# Patient Record
Sex: FEMALE | Race: WHITE | NOT HISPANIC OR LATINO | Employment: OTHER | URBAN - METROPOLITAN AREA
[De-identification: names, ages, dates, MRNs, and addresses within clinical notes are randomized per-mention and may not be internally consistent; named-entity substitution may affect disease eponyms.]

---

## 2018-12-13 ENCOUNTER — HOSPITAL ENCOUNTER (OUTPATIENT)
Dept: OTHER | Facility: HOSPITAL | Age: 63
Discharge: HOME | End: 2018-12-13
Attending: PHYSICIAN ASSISTANT | Admitting: PHYSICIAN ASSISTANT

## 2018-12-13 LAB
ALBUMIN SERPL-MCNC: 3.6 G/DL (ref 3.5–4.8)
ALBUMIN/GLOB SERPL: 1.2 {RATIO} (ref 1–1.7)
ALP SERPL-CCNC: 83 IU/L (ref 32–91)
ALT SERPL-CCNC: 30 IU/L (ref 14–54)
ANION GAP SERPL CALC-SCNC: 11.1 MMOL/L (ref 10–20)
AST SERPL-CCNC: 26 IU/L (ref 15–41)
BASOPHILS # BLD AUTO: 0.1 10*3/UL (ref 0–0.2)
BASOPHILS NFR BLD AUTO: 1 % (ref 0–2)
BILIRUB SERPL-MCNC: 0.5 MG/DL (ref 0.3–1.2)
BUN SERPL-MCNC: 21 MG/DL (ref 8–20)
BUN/CREAT SERPL: 23.3 (ref 5.4–26.2)
CALCIUM SERPL-MCNC: 9.2 MG/DL (ref 8.9–10.3)
CHLORIDE SERPL-SCNC: 102 MMOL/L (ref 101–111)
CK MB CFR SERPL ELPH: 1.8 NG/ML (ref 0.6–6.3)
CONV CO2: 29 MMOL/L (ref 22–32)
CONV TOTAL PROTEIN: 6.5 G/DL (ref 6.1–7.9)
CREAT UR-MCNC: 0.9 MG/DL (ref 0.4–1)
DIFFERENTIAL METHOD BLD: (no result)
EOSINOPHIL # BLD AUTO: 0.2 10*3/UL (ref 0–0.3)
EOSINOPHIL # BLD AUTO: 4 % (ref 0–3)
ERYTHROCYTE [DISTWIDTH] IN BLOOD BY AUTOMATED COUNT: 14.5 % (ref 11.5–14.5)
GLOBULIN UR ELPH-MCNC: 2.9 G/DL (ref 2.5–3.8)
GLUCOSE SERPL-MCNC: 123 MG/DL (ref 65–99)
HCT VFR BLD AUTO: 41.7 % (ref 35–49)
HGB BLD-MCNC: 14.4 G/DL (ref 12–15)
LYMPHOCYTES # BLD AUTO: 1.6 10*3/UL (ref 0.8–4.8)
LYMPHOCYTES NFR BLD AUTO: 25 % (ref 18–42)
MCH RBC QN AUTO: 31.5 PG (ref 26–32)
MCHC RBC AUTO-ENTMCNC: 34.6 G/DL (ref 32–36)
MCV RBC AUTO: 91.2 FL (ref 80–94)
MONOCYTES # BLD AUTO: 0.6 10*3/UL (ref 0.1–1.3)
MONOCYTES NFR BLD AUTO: 9 % (ref 2–11)
NEUTROPHILS # BLD AUTO: 3.8 10*3/UL (ref 2.3–8.6)
NEUTROPHILS NFR BLD AUTO: 61 % (ref 50–75)
NRBC BLD AUTO-RTO: 0 /100{WBCS}
NRBC/RBC NFR BLD MANUAL: 0 10*3/UL
PLATELET # BLD AUTO: 150 10*3/UL (ref 150–450)
PMV BLD AUTO: 9.8 FL (ref 7.4–10.4)
POTASSIUM SERPL-SCNC: 4.1 MMOL/L (ref 3.6–5.1)
RBC # BLD AUTO: 4.57 10*6/UL (ref 4–5.4)
SODIUM SERPL-SCNC: 138 MMOL/L (ref 136–144)
TROPONIN I SERPL-MCNC: <0.03 NG/ML (ref 0–0.03)
WBC # BLD AUTO: 6.3 10*3/UL (ref 4.5–11.5)

## 2019-12-23 ENCOUNTER — OFFICE (AMBULATORY)
Dept: URBAN - METROPOLITAN AREA CLINIC 64 | Facility: CLINIC | Age: 64
End: 2019-12-23

## 2019-12-23 VITALS
HEIGHT: 65 IN | DIASTOLIC BLOOD PRESSURE: 54 MMHG | HEART RATE: 98 BPM | SYSTOLIC BLOOD PRESSURE: 149 MMHG | WEIGHT: 245 LBS

## 2019-12-23 DIAGNOSIS — R10.11 RIGHT UPPER QUADRANT PAIN: ICD-10-CM

## 2019-12-23 DIAGNOSIS — K76.0 FATTY (CHANGE OF) LIVER, NOT ELSEWHERE CLASSIFIED: ICD-10-CM

## 2019-12-23 DIAGNOSIS — I10 ESSENTIAL (PRIMARY) HYPERTENSION: ICD-10-CM

## 2019-12-23 DIAGNOSIS — R10.9 UNSPECIFIED ABDOMINAL PAIN: ICD-10-CM

## 2019-12-23 PROCEDURE — 99203 OFFICE O/P NEW LOW 30 MIN: CPT | Performed by: INTERNAL MEDICINE

## 2019-12-23 RX ORDER — POLYETHYLENE GLYCOL 3350, SODIUM SULFATE, SODIUM CHLORIDE, POTASSIUM CHLORIDE, ASCORBIC ACID, SODIUM ASCORBATE 140-9-5.2G
KIT ORAL
Qty: 1 | Refills: 0 | Status: ACTIVE
Start: 2019-12-23

## 2021-01-19 PROCEDURE — U0003 INFECTIOUS AGENT DETECTION BY NUCLEIC ACID (DNA OR RNA); SEVERE ACUTE RESPIRATORY SYNDROME CORONAVIRUS 2 (SARS-COV-2) (CORONAVIRUS DISEASE [COVID-19]), AMPLIFIED PROBE TECHNIQUE, MAKING USE OF HIGH THROUGHPUT TECHNOLOGIES AS DESCRIBED BY CMS-2020-01-R: HCPCS | Performed by: NURSE PRACTITIONER

## 2021-01-20 ENCOUNTER — TELEPHONE (OUTPATIENT)
Dept: URGENT CARE | Facility: CLINIC | Age: 66
End: 2021-01-20

## 2021-11-23 PROCEDURE — U0004 COV-19 TEST NON-CDC HGH THRU: HCPCS | Performed by: NURSE PRACTITIONER

## 2022-01-12 PROCEDURE — 88341 IMHCHEM/IMCYTCHM EA ADD ANTB: CPT

## 2022-01-12 PROCEDURE — 88305 TISSUE EXAM BY PATHOLOGIST: CPT | Performed by: RADIOLOGY

## 2022-01-12 PROCEDURE — 88360 TUMOR IMMUNOHISTOCHEM/MANUAL: CPT

## 2022-01-12 PROCEDURE — 88360 TUMOR IMMUNOHISTOCHEM/MANUAL: CPT | Performed by: RADIOLOGY

## 2022-01-12 PROCEDURE — 88342 IMHCHEM/IMCYTCHM 1ST ANTB: CPT

## 2022-01-12 PROCEDURE — 88342 IMHCHEM/IMCYTCHM 1ST ANTB: CPT | Performed by: RADIOLOGY

## 2022-01-12 PROCEDURE — 88341 IMHCHEM/IMCYTCHM EA ADD ANTB: CPT | Performed by: RADIOLOGY

## 2022-01-13 ENCOUNTER — LAB REQUISITION (OUTPATIENT)
Dept: LAB | Facility: HOSPITAL | Age: 67
End: 2022-01-13

## 2022-01-13 DIAGNOSIS — Z00.00 ENCOUNTER FOR GENERAL ADULT MEDICAL EXAMINATION WITHOUT ABNORMAL FINDINGS: ICD-10-CM

## 2022-01-20 ENCOUNTER — TELEPHONE (OUTPATIENT)
Dept: SURGERY | Facility: CLINIC | Age: 67
End: 2022-01-20

## 2022-01-20 LAB
LAB AP CASE REPORT: NORMAL
LAB AP DIAGNOSIS COMMENT: NORMAL
LAB AP IHC HER2/NEU REPORT,ADDENDUM: NORMAL
LAB AP SPECIAL STAINS: NORMAL
PATH REPORT.FINAL DX SPEC: NORMAL
PATH REPORT.GROSS SPEC: NORMAL

## 2022-01-20 NOTE — TELEPHONE ENCOUNTER
New patient appointment with Dr. Serrano is scheduled on 2/3/2022 @ 9:00am.    Called and left message with patient about appointment time, patient to call back and confirm.    Sent patient a reminder letter in the mail.

## 2022-01-21 ENCOUNTER — TELEPHONE (OUTPATIENT)
Dept: SURGERY | Facility: CLINIC | Age: 67
End: 2022-01-21

## 2022-01-21 NOTE — TELEPHONE ENCOUNTER
Patient has requested Dr. Isaac, Dr. Isaac's next available appointment was 2/28/2022 @ 10:30am.    She was offered appointment with Dr. Serrano on 2/3/2022.

## 2022-01-24 ENCOUNTER — TELEPHONE (OUTPATIENT)
Dept: SURGERY | Facility: CLINIC | Age: 67
End: 2022-01-24

## 2022-01-24 ENCOUNTER — LAB REQUISITION (OUTPATIENT)
Dept: LAB | Facility: HOSPITAL | Age: 67
End: 2022-01-24

## 2022-01-24 DIAGNOSIS — Z00.00 ENCOUNTER FOR GENERAL ADULT MEDICAL EXAMINATION WITHOUT ABNORMAL FINDINGS: ICD-10-CM

## 2022-01-24 NOTE — TELEPHONE ENCOUNTER
----- Message from Antonio Moreno sent at 1/24/2022  1:14 PM EST -----  Pt called while you were away from your desk to let you know she has decided to keep Dr. Isaac's appt and to let us know she would be happy to take an earlier appt as long as it is with [place].

## 2022-01-25 LAB
LAB AP CASE REPORT: NORMAL
LAB AP DIAGNOSIS COMMENT: NORMAL
PATH REPORT.FINAL DX SPEC: NORMAL
PATH REPORT.GROSS SPEC: NORMAL

## 2022-01-26 DIAGNOSIS — C50.412 MALIGNANT NEOPLASM OF UPPER-OUTER QUADRANT OF LEFT BREAST IN FEMALE, ESTROGEN RECEPTOR POSITIVE: Primary | ICD-10-CM

## 2022-01-26 DIAGNOSIS — Z17.0 MALIGNANT NEOPLASM OF UPPER-OUTER QUADRANT OF LEFT BREAST IN FEMALE, ESTROGEN RECEPTOR POSITIVE: Primary | ICD-10-CM

## 2022-01-27 ENCOUNTER — TELEPHONE (OUTPATIENT)
Dept: SURGERY | Facility: CLINIC | Age: 67
End: 2022-01-27

## 2022-01-27 NOTE — TELEPHONE ENCOUNTER
Breast MRI is scheduled on 1/30/2022 @ 12:30pm, patient arrival 12:00pm.    Called and left message with patient about appointment time, patient to call back and confirm.

## 2022-01-28 ENCOUNTER — TELEPHONE (OUTPATIENT)
Dept: SURGERY | Facility: CLINIC | Age: 67
End: 2022-01-28

## 2022-01-28 NOTE — TELEPHONE ENCOUNTER
Appointment with Dr. Isaac on 2/28/2022 has been moved up to 2/18/2022 @ 11:00am.    Called and left message with patient about appointment time change, patient to call back and confirm.

## 2022-01-30 ENCOUNTER — HOSPITAL ENCOUNTER (OUTPATIENT)
Dept: MRI IMAGING | Facility: HOSPITAL | Age: 67
Discharge: HOME | End: 2022-01-30
Admitting: SURGERY

## 2022-01-30 DIAGNOSIS — Z17.0 MALIGNANT NEOPLASM OF UPPER-OUTER QUADRANT OF LEFT BREAST IN FEMALE, ESTROGEN RECEPTOR POSITIVE: ICD-10-CM

## 2022-01-30 DIAGNOSIS — C50.412 MALIGNANT NEOPLASM OF UPPER-OUTER QUADRANT OF LEFT BREAST IN FEMALE, ESTROGEN RECEPTOR POSITIVE: ICD-10-CM

## 2022-01-30 PROCEDURE — 0 GADOBENATE DIMEGLUMINE 529 MG/ML SOLUTION: Performed by: SURGERY

## 2022-01-30 PROCEDURE — 77049 MRI BREAST C-+ W/CAD BI: CPT

## 2022-01-30 PROCEDURE — 82565 ASSAY OF CREATININE: CPT

## 2022-01-30 PROCEDURE — A9577 INJ MULTIHANCE: HCPCS | Performed by: SURGERY

## 2022-01-30 RX ADMIN — GADOBENATE DIMEGLUMINE 20 ML: 529 INJECTION, SOLUTION INTRAVENOUS at 13:05

## 2022-02-03 LAB — CREAT BLDA-MCNC: 1 MG/DL (ref 0.6–1.3)

## 2022-02-18 ENCOUNTER — PREP FOR SURGERY (OUTPATIENT)
Dept: OTHER | Facility: HOSPITAL | Age: 67
End: 2022-02-18

## 2022-02-18 ENCOUNTER — TELEPHONE (OUTPATIENT)
Dept: SURGERY | Facility: CLINIC | Age: 67
End: 2022-02-18

## 2022-02-18 ENCOUNTER — TRANSCRIBE ORDERS (OUTPATIENT)
Dept: SURGERY | Facility: CLINIC | Age: 67
End: 2022-02-18

## 2022-02-18 ENCOUNTER — OFFICE VISIT (OUTPATIENT)
Dept: SURGERY | Facility: CLINIC | Age: 67
End: 2022-02-18

## 2022-02-18 VITALS
DIASTOLIC BLOOD PRESSURE: 82 MMHG | HEIGHT: 65 IN | BODY MASS INDEX: 47.48 KG/M2 | WEIGHT: 285 LBS | OXYGEN SATURATION: 98 % | SYSTOLIC BLOOD PRESSURE: 136 MMHG | HEART RATE: 52 BPM

## 2022-02-18 DIAGNOSIS — C50.412 MALIGNANT NEOPLASM OF UPPER-OUTER QUADRANT OF LEFT FEMALE BREAST, UNSPECIFIED ESTROGEN RECEPTOR STATUS: Primary | ICD-10-CM

## 2022-02-18 DIAGNOSIS — N64.89 BREAST ASYMMETRY: ICD-10-CM

## 2022-02-18 DIAGNOSIS — Z80.3 FH: BREAST CANCER: ICD-10-CM

## 2022-02-18 DIAGNOSIS — R92.8 ABNORMAL MRI, BREAST: ICD-10-CM

## 2022-02-18 DIAGNOSIS — E66.01 MORBID (SEVERE) OBESITY DUE TO EXCESS CALORIES: ICD-10-CM

## 2022-02-18 DIAGNOSIS — N63.0 LUMP OR MASS IN BREAST: ICD-10-CM

## 2022-02-18 DIAGNOSIS — C50.412 BREAST CANCER OF UPPER-OUTER QUADRANT OF LEFT FEMALE BREAST: Primary | ICD-10-CM

## 2022-02-18 DIAGNOSIS — Z80.41 FH: OVARIAN CANCER: ICD-10-CM

## 2022-02-18 DIAGNOSIS — Z17.0 MALIGNANT NEOPLASM OF UPPER-OUTER QUADRANT OF LEFT BREAST IN FEMALE, ESTROGEN RECEPTOR POSITIVE: Primary | ICD-10-CM

## 2022-02-18 DIAGNOSIS — C50.412 MALIGNANT NEOPLASM OF UPPER-OUTER QUADRANT OF LEFT BREAST IN FEMALE, ESTROGEN RECEPTOR POSITIVE: Primary | ICD-10-CM

## 2022-02-18 DIAGNOSIS — Z80.0 FH: PANCREATIC CANCER: ICD-10-CM

## 2022-02-18 PROCEDURE — 99204 OFFICE O/P NEW MOD 45 MIN: CPT | Performed by: SURGERY

## 2022-02-18 RX ORDER — CEFAZOLIN SODIUM IN 0.9 % NACL 3 G/100 ML
3 INTRAVENOUS SOLUTION, PIGGYBACK (ML) INTRAVENOUS ONCE
Status: CANCELLED | OUTPATIENT
Start: 2022-03-30 | End: 2022-02-18

## 2022-02-18 RX ORDER — SODIUM CHLORIDE, SODIUM LACTATE, POTASSIUM CHLORIDE, CALCIUM CHLORIDE 600; 310; 30; 20 MG/100ML; MG/100ML; MG/100ML; MG/100ML
100 INJECTION, SOLUTION INTRAVENOUS CONTINUOUS
Status: CANCELLED | OUTPATIENT
Start: 2022-03-30

## 2022-02-18 RX ORDER — DIAZEPAM 5 MG/1
5 TABLET ORAL ONCE
Status: CANCELLED | OUTPATIENT
Start: 2022-03-30 | End: 2022-02-18

## 2022-02-18 NOTE — PROGRESS NOTES
Chief Complaint: Rafaela Walker is a 66 y.o. female who was seen in consultation at the request of SAMANTA Rodney  for newly diagnosed breast cancer    History of Present Illness:  Patient presents with newly diagnosed breast cancer LEFT breast. She noted no new masses, skin changes, nipple discharge, nipple changes prior to her most recent imaging.  Her most recent imaging includes the followin2017 RIGHT BREAST US     PRIORITY RADIOLOGY     RAFAELA WALKER  Right breast, 10:00 position, 9 cm from the nipple, there is a 15 x 9 x 18 mm cyst. This corresponds to the mammographic abnormality.  BI-RADS 2: Benign.    PROGRESS NOTES       RAFAELA WALKER  Urinalysis.     2021 BILATERAL SCREENING MAMMO WITH LOW   PR[facility]ITY RADIOLOGY    RAFAELA WALKER  Scattered areas of fibroglandular density. A previously described cyst in the 9:00 posterior right breast, best depicted on prior diagnostic ultrasound from , has significantly diminished in size. Within the left breast, there is a new single view subcentimeter focal asymmetry superiorly posterior third depth on the MLO view.  BI-RADS 0.    2021 LEFT DIAGNOSTIC MAMMO WITH LOW & LEFT BREAST US     PRIORITY     RAFAELA WALKER  MMG:  Scattered areas of fibroglandular density. 6 mm indistinct mass in the outer, slightly upper left breast, posterior depth.  US:  Corresponding to the left breast mass on mammogram, there is a 5 x 4 x 5 mm indistinct hypoechoic mass 2:00, 9 cm from the nipple.  BI-RADS 4.      She had a biopsy on the following day that showed:     2022 LEFT US GUIDED BREAST BIOPSY      [facility]IOLOG[facility]       RAFAELA WALKER  ADDENDUM:  The imaging and mammographic findings are concordant. I would recommend consideration of diagnostic breast MRI, given the multifocality.  Left breast 2:00 position approximately 9 cm the nipple, a 5 x 4 x 3 mm hypoechoic shadowing focus was demonstrated. A 12 gauge 5 cm guide was advanced to the periphery of  this nodule. Through the guide, six 2.3 cm 14 gauge core needle specimens were obtained. A [place] clip was placed in the nodule. 2 view post procedure mammogram demonstrated that this did not represent the intended target. This is designated as lesion #1. The clip is located in the more anterior position the upper outer left breast than the target lesion. Subsequently, the left breast was prepped and draped in usual sterile fashion, with attention to more posterior position closer to the axillary tail. A hypoechoic nodule was localized in the 2:00 position 15 cm from the nipple measuring 5 x 6 x 6 mm, at this location. Four 2.3 cm 14 gauge core needle specimens were obtained. A Hydramark #4 clip was placed in this nodule. 2 view post-surgery mammogram demonstrated appropriate clip placement in the target lesion, corresponding to previous diagnostic mammogram and screening mammogram abnormality. This is designated as lesion #2.  PATHOLOGY:  IHC HER2/lelia Report, Addendum:  Specimen #1:  HER2/lelia by IHC has returned Negative (1+)  Specimen #2:  HER2/lelia by IHC has returned Negative (1+)  FINAL DIAGNOSIS:  Specimen #1:  Invasive lobular carcinoma (tubule score 3, nuclear score 1, mitotic score 1) total 5/9. Largest tumor focus is 5 mm.  ER: 100%  SC: 100%  HER2: 1+  Specimen #2 (Left breast, 2 o’clock, 9 cm from the nipple, core biopsy):  Invasive lobular carcinoma (tubule score 3, nuclear score 1, mitotic score 1) total 5/9. Largest tumor focus is 4 mm. Focal atypical lobular hyperplasia.  ER: Strong, 100%  SC: Strong, 100%  HER2: 1+  OUTSIDE PATHOLOGY CONSULTATION (1/24/22):  1. Left Breast at 2 o’clock, 15 cm from Nipple:  A. Invasive lobular carcinoma  1. Overall Elliston grade I (tubular score 3, nuclear score 1, mitotic score 1)  2. Invasive carcinoma measures at least 5 mm.  3. No lymphovascular space invasion.  B. No in situ carcinoma identified.  ER: 99%, positive  SC: 99%, positive  HER2: Score 1+  Ki-67:  12%  2. Left Breast at 2 o’clock, 9 cm from Nipple, core biopsy:  A. Invasive lobular carcinoma  1. Overall Kenneth grade II (tubular score 3, nuclear score 2, mitotic score 1)  2. Invasive carcinoma measures at least 4 mm in greatest dimension.  3. No lymphovascular space invasion.  B. Focal atypical lobular hyperplasia (ALH) noted.  ER: 99%, positive  CO: 99%, positive  HER2: Score 1+  Ki-67: 13%    I then arranged for a MRI:      01/31/2022 BILATERAL BREAST MRI          [facility]         RAFAELA AARON  Left breast 2 o’clock posterior one-third 10 cm from the nipple there is a focal signal void. This corresponds to the V-shaped metallic clip. No abnormal enhancement is seen in the immediate vicinity of the V-shaped metallic clip. This metallic clip is on the order of 2.8 cm posterior to an area of clumped enhancement that measures on the order of 5.2 cm in the anterior to posterior, 4.3 cm in the medial to lateral, 3.2 cm in the superior to inferior which corresponds to an area of prominent parenchyma that can be seen on the provided mammograms in the middle third upper outer quadrant of the left breast. Additionally, there is a second more posteriorly located metallic clip at the 2 o’clock axis on the order of 14 cm from the nipple that corresponds to a coil-shaped metallic clip. This metallic clip is immediately adjacent to an enhancing lesion that measures 0.9 cm in the anterior to posterior dimension, 0.6 cm in the superior-inferior dimension and 0.6 cm in the medial to lateral dimension which represents biopsy-proven site of malignancy. No other areas of abnormal enhancement left breast. No evidence for left axillary or internal mammary chain adenopathy. There are no areas of abnormal enhancement right breast.        She has not had a breast biopsy in the past.  She has had her uterus and ovaries removed in 1999, is postmenopausal, and takes nor hormones.  Her family history includes the following: She has 1  daughter, 1 son, 2 paternal half-sisters, no aunts on either side of the family.  Her mother had ovarian cancer at 67, her maternal grandmother had pancreas cancer at 71, 2 of her brothers had liver cancer but abused alcohol and drugs, and a daughter who had DCIS at age 49 who is a patient of Dr. Smiley.  She is here for evaluation.    Review of Systems:  Review of Systems   Eyes: Positive for eye problems (cataracts).   Musculoskeletal: Positive for arthralgias.   Psychiatric/Behavioral: Positive for depression.   All other systems reviewed and are negative.       Past Medical and Surgical History:  Breast Biopsy History:  Patient had not had a breast biopsy prior to her cancer diagnosis.  Breast Cancer HIstory:  Patient does not have a past medical history of breast cancer.  Breast Operations, and year:  None   Obstetric/Gynecologic History:  Age menstrual periods began: 11  Patient is postmenopausal due to removal of her uterus and both ovaries in the following year: age 39   Number of pregnancies: 3  Number of live births: 2  Number of abortions or miscarriages: 1  Age of delivery of first child: 18  Patient did not breast feed.  Length of time taking birth control pills: 3 yrs   Patient has never taken hormone replacement    Patient had both ovaries and uterus removed.   Past Surgical History:   Procedure Laterality Date   •  SECTION     • CHOLECYSTECTOMY     • HYSTERECTOMY     • KNEE SURGERY       Past Medical History:   Diagnosis Date   • Anxiety and depression    • Insomnia        Prior Hospitalizations, other than for surgery or childbirth, and year:  None     Social History     Socioeconomic History   • Marital status:    Tobacco Use   • Smoking status: Never Smoker   • Smokeless tobacco: Never Used   Vaping Use   • Vaping Use: Never used   Substance and Sexual Activity   • Alcohol use: Never   • Drug use: Never   • Sexual activity: Defer     Patient is .  Patient is employed full  "time with the following occupation: nurse    Patient drinks 1 servings of caffeine per day.    Family History:  Family History   Problem Relation Age of Onset   • Ovarian cancer Mother 67   • Lung cancer Father    • Liver cancer Brother    • Pancreatic cancer Maternal Grandmother    • Liver cancer Brother        Vital Signs:  /82   Pulse 52   Ht 165.1 cm (65\")   Wt 129 kg (285 lb)   LMP  (LMP Unknown)   SpO2 98%   Breastfeeding No   BMI 47.43 kg/m²      Medications:    Current Outpatient Medications:   •  citalopram (CeleXA) 40 MG tablet, Take 1 tablet by mouth daily., Disp: 30 tablet, Rfl: 5  •  propranolol LA (INDERAL LA) 160 MG 24 hr capsule, Take 1 capsule by mouth Every Night., Disp: 90 capsule, Rfl: 3  •  spironolactone (ALDACTONE) 25 MG tablet, Take 1 tablet by mouth daily., Disp: 90 tablet, Rfl: 1  •  zolpidem (AMBIEN) 5 MG tablet, Take 5 mg by mouth., Disp: , Rfl:   •  zolpidem (AMBIEN) 5 MG tablet, Take 1 tablet by mouth nightly as needed for Sleep.  Max Daily Amount: 5 mg, Disp: 30 tablet, Rfl: 2  •  clonazePAM (KlonoPIN) 0.5 MG tablet, Take 0.5-1 tablets by mouth daily as needed for Anxiety for up to 7 days Must last 30 days., Disp: 7 tablet, Rfl: 0  •  promethazine-dextromethorphan (PROMETHAZINE-DM) 6.25-15 MG/5ML syrup, Take 5 mL by mouth 4 (Four) Times a Day As Needed for Cough., Disp: 180 mL, Rfl: 0     Allergies:  No Known Allergies    Physical Examination:  /82   Pulse 52   Ht 165.1 cm (65\")   Wt 129 kg (285 lb)   LMP  (LMP Unknown)   SpO2 98%   Breastfeeding No   BMI 47.43 kg/m²   General Appearance:  Patient is in no distress.  She is well kept and has an morbidly obese build.   Psychiatric:  Patient with appropriate mood and affect. Alert and oriented to self, time, and place.    Breast, RIGHT:  large sized, asymmetric with the contralateral side as below.  Breast skin is without erythema, edema, rashes.  There are no visible abnormalities upon inspection during " the arm-raising maneuver or with hands on hips in the sitting position. There is no nipple retraction, discharge or nipple/areolar skin changes.There are no masses palpable in the sitting or supine positions.    Breast, LEFT:  large sized, asymmetric with the contralateral side.   The left breast looks approximately 1 cup smaller than the right due to retraction from the tumor burden.  At the left breast 1:00, 6 cm from the nipple there is a 6 x 8 cm palpable mass.  No overlying skin changes.  Nontender.  Freely mobile.  Breast skin is without erythema, edema, rashes.  There are no visible abnormalities upon inspection during the arm-raising maneuver or with hands on hips in the sitting position. There is no nipple retraction, discharge or nipple/areolar skin changes.There are no other masses palpable in the sitting or supine positions.    Lymphatic:  There is no axillary, cervical, infraclavicular, or supraclavicular adenopathy bilaterally.  Eyes:  Pupils are round and reactive to light.  Cardiovascular:  Heart rate and rhythm are regular.  Respiratory:  Lungs are clear bilaterally with no crackles or wheezes in any lung field.  Gastrointestinal:  Abdomen is soft, nondistended, and nontender.     Musculoskeletal:  Good strength in all 4 extremities.   There is good range of motion in both shoulders.    Skin:  No new skin lesions or rashes on the skin excluding the breast (see breast exam above).        Imagin2016 BILATERAL SCREENING MAMMO WITH LOW    [facility]ITY RADIOLOGY    RAFAELA WALKER  Scattered areas fibroglandular density.  BI-RADS 1: Negative.    2017 BILATERAL SCREENING MAMMO WITH LOW     [facility]ITY RADIOLOGY      RAFAELA WALKER  Scattered fibroglandular density. In the right breast there is a well-circumscribed lobular oval equal density mass measuring 1.6 cm.  BI-RADS 0.    2017 RIGHT BREAST US     [facility]ITY RADIOLOGY     RAFAELA AARON  Right breast, 10:00 position, 9 cm from the nipple, there  is a 15 x 9 x 18 mm cyst. This corresponds to the mammographic abnormality.  BI-RADS 2: Benign.    PROGRESS NOTES       RAFAELA WALKER  Urinalysis.     12/07/2021 BILATERAL SCREENING MAMMO WITH LOW   PR[facility]ITY RADIOLOGY    RAFAELA WALKER  Scattered areas of fibroglandular density. A previously described cyst in the 9:00 posterior right breast, best depicted on prior diagnostic ultrasound from 2017, has significantly diminished in size. Within the left breast, there is a new single view subcentimeter focal asymmetry superiorly posterior third depth on the MLO view.  BI-RADS 0.    12/17/2021 LEFT DIAGNOSTIC MAMMO WITH LOW & LEFT BREAST US     PRIORITY     RAFAELA WALKER  MMG:  Scattered areas of fibroglandular density. 6 mm indistinct mass in the outer, slightly upper left breast, posterior depth.  US:  Corresponding to the left breast mass on mammogram, there is a 5 x 4 x 5 mm indistinct hypoechoic mass 2:00, 9 cm from the nipple.  BI-RADS 4.      01/31/2022 BILATERAL BREAST MRI          [facility]         RAFAELA WALKER  Left breast 2 o’clock posterior one-third 10 cm from the nipple there is a focal signal void. This corresponds to the V-shaped metallic clip. No abnormal enhancement is seen in the immediate vicinity of the V-shaped metallic clip. This metallic clip is on the order of 2.8 cm posterior to an area of clumped enhancement that measures on the order of 5.2 cm in the anterior to posterior, 4.3 cm in the medial to lateral, 3.2 cm in the superior to inferior which corresponds to an area of prominent parenchyma that can be seen on the provided mammograms in the middle third upper outer quadrant of the left breast. Additionally, there is a second more posteriorly located metallic clip at the 2 o’clock axis on the order of 14 cm from the nipple that corresponds to a coil-shaped metallic clip. This metallic clip is immediately adjacent to an enhancing lesion that measures 0.9 cm in the anterior to posterior dimension, 0.6 cm in  the superior-inferior dimension and 0.6 cm in the medial to lateral dimension which represents biopsy-proven site of malignancy. No other areas of abnormal enhancement left breast. No evidence for left axillary or internal mammary chain adenopathy. There are no areas of abnormal enhancement right breast.        Pathology:    01/12/2022 LEFT US GUIDED BREAST BIOPSY      [facility] RADIOLOGY       RAFAELA WALKER  ADDENDUM:  The imaging and mammographic findings are concordant. I would recommend consideration of diagnostic breast MRI, given the multifocality.  Left breast 2:00 position approximately 9 cm the nipple, a 5 x 4 x 3 mm hypoechoic shadowing focus was demonstrated. A 12 gauge 5 cm guide was advanced to the periphery of this nodule. Through the guide, six 2.3 cm 14 gauge core needle specimens were obtained. A Jessica clip was placed in the nodule. 2 view post procedure mammogram demonstrated that this did not represent the intended target. This is designated as lesion #1. The clip is located in the more anterior position the upper outer left breast than the target lesion. Subsequently, the left breast was prepped and draped in usual sterile fashion, with attention to more posterior position closer to the axillary tail. A hypoechoic nodule was localized in the 2:00 position 15 cm from the nipple measuring 5 x 6 x 6 mm, at this location. Four 2.3 cm 14 gauge core needle specimens were obtained. A Hydramark #4 clip was placed in this nodule. 2 view post-surgery mammogram demonstrated appropriate clip placement in the target lesion, corresponding to previous diagnostic mammogram and screening mammogram abnormality. This is designated as lesion #2.  PATHOLOGY:  IHC HER2/lelia Report, Addendum:  Specimen #1:  HER2/lelia by IHC has returned Negative (1+)  Specimen #2:  HER2/lelia by IHC has returned Negative (1+)  FINAL DIAGNOSIS:  Specimen #1:  Invasive lobular carcinoma (tubule score 3, nuclear score 1, mitotic score 1) total  5/9. Largest tumor focus is 5 mm.  ER: 100%  PA: 100%  HER2: 1+  Specimen #2 (Left breast, 2 o’clock, 9 cm from the nipple, core biopsy):  Invasive lobular carcinoma (tubule score 3, nuclear score 1, mitotic score 1) total 5/9. Largest tumor focus is 4 mm. Focal atypical lobular hyperplasia.  ER: Strong, 100%  PA: Strong, 100%  HER2: 1+  OUTSIDE PATHOLOGY CONSULTATION (1/24/22):  1. Left Breast at 2 o’clock, 15 cm from Nipple:  A. Invasive lobular carcinoma  1. Overall Arthur grade I (tubular score 3, nuclear score 1, mitotic score 1)  2. Invasive carcinoma measures at least 5 mm.  3. No lymphovascular space invasion.  B. No in situ carcinoma identified.  ER: 99%, positive  PA: 99%, positive  HER2: Score 1+  Ki-67: 12%  2. Left Breast at 2 o’clock, 9 cm from Nipple, core biopsy:  A. Invasive lobular carcinoma  1. Overall Kenneth grade II (tubular score 3, nuclear score 2, mitotic score 1)  2. Invasive carcinoma measures at least 4 mm in greatest dimension.  3. No lymphovascular space invasion.  B. Focal atypical lobular hyperplasia (ALH) noted.  ER: 99%, positive  PA: 99%, positive  HER2: Score 1+  Ki-67: 13%    Procedures:      Assessment:   Diagnosis Plan   1. Malignant neoplasm of upper-outer quadrant of left breast in female, estrogen receptor positive (HCC)  Ambulatory Referral to Physical Therapy Bioimpedance    Ambulatory Referral to Plastic Surgery    Ambulatory Referral to Nutrition Services   2. Abnormal MRI, breast     3. Lump or mass in breast     4. Morbid (severe) obesity due to excess calories (HCC)  Ambulatory Referral to Physical Therapy Bioimpedance    Ambulatory Referral to Nutrition Services   5. FH: breast cancer     6. FH: ovarian cancer     7. FH: pancreatic cancer     8. Breast asymmetry     1-3  Left breast mass 1:00, 6 cm from the nipple, 6 x 8 cm on examination, MRI markers are 6 cm apart but then anterior to the 2 below  markers there is a 5 x 4.3 cm area of enhancement.  1.  Left  breast 2:00, 9 cm from the nipple, 5 mm focus, Venus marker, invasive lobular carcinoma, low-grade, 3, 1, 1, 5 mm in a core, no lymphovascular invasion, no duct carcinoma in situ, estrogen 100, progesterone 100, HER-2/lelia 1+, Ki-67 12%.      Site 2.  Left breast 2:00, 15 cm in the nipple, 6 mm on imaging, hydro-Thomas marker left in good position, invasive lobular carcinoma, intermediate grade, 3, 2, 1, 4 mm in greatest dimension, focal atypical lobular hyperplasia, no lymphovascular invasion, estrogen 100, progesterone 100, HER-2/lelia 1+, Ki-67 13%.      Clinical stage T3N0 stage IIb nodes negative on examination and MRI.    4-  BMI 47      5-  Daughter- patient of Dr Srerano- DCIS- age 47      6-  Mother age 67    7-  MGM age 71    8-  Breast asymmetry due to retraction on the left from tumor.      Plan:  The patient goes by Mary Ann.  She is friends with several of my old patients.  Her daughter is a patient of Dr. Serrano's.  We reviewed her history, imaging, imaging reports, pathology reports and examination together today.    We reviewed the difference in systemic therapy versus locoregional. She knows that she will be seeing a medical oncologist in the adjuvant setting.     We discussed that for early stage breast cancer, there are 2 options for locoregional treatment that have equivalent survival: total mastectomy versus lumpectomy and radiation, either with sentinel node biopsy. Based on her imaging and examination, we discussed that a unilateral mastectomy with or without reconstruction would be the recommended surgical treatment.     She understands the risks of mastectomy including bleeding, infection, seroma and chance of skin ischemia/necrosis. She understands the risks of  sentinel node biopsy, including 7% chance of lymphedema, injury to nerves or vessels in the axilla,  seroma. We discussed that we will proceed with a frozen section analysis of the sentinel node in the operating room, and if any positivity,  would proceed with a completion axillary dissection at that time.      We discussed her having a plastic surgical consultation in the preoperative period, and have arranged that today.   She has requested Dr. Montgomery.  Note that it is very likely that she will need radiation due to the size of her tumor.    NCCN guidelines have been followed.    We gave her EMLA cream and tegederm for her sentinel node procedure, and arranged for her to see our nurse navigator.   She will receive a recommendation about radiation after surgery.    We discussed her obesity and the recommendation to see nutrition and to pursue some weight loss as it pertains to reducing her estrogen levels so that this will favorably impact her prognosis.  I will arrange for her to see Dr. Montgomery, nutritionist, physical therapy for bioimpedance measurements.  Likely we will send an Oncotype in the postoperative period.  I sent a genetic [facility] breast and gynecology panel add on pancreas and liver due to her daughter having breast cancer her mother having ovarian cancer and her grandmother having pancreas cancer.      We plan for a left total mastectomy, left axillary sentinel lymph node biopsy, possible axillary lymph node dissection, possible reconstruction.    I discouraged her from doing a procedure on the right breast.  We discussed that her risk of developing a second breast cancer in her lifetime is approximately 1% and perhaps lower if taking hormone blocking medication.  She was not interested in having her right breast removed at this time unless her genetics were to show a mutation.      Catherine Isaac MD        We have spent 65 minutes in face to face visit today, 55 in face to face .      Next Appointment:  Return for surgery.      EMR Dragon/transcription disclaimer:    Much of this encounter note is an electronic transcription/translocation of spoken language to printed text.  The electronic translation of spoken language  may permit erroneous, or at times, nonsensical words or phrases to be inadvertently transcribed.  Although I have reviewed the note from such areas, some may still exist.

## 2022-02-21 ENCOUNTER — TELEPHONE (OUTPATIENT)
Dept: SURGERY | Facility: CLINIC | Age: 67
End: 2022-02-21

## 2022-02-21 NOTE — TELEPHONE ENCOUNTER
Plastics appointment with Dr. Montgomery is scheduled on 2/24/2022 @ 1:45pm.    Called and spoke to patient, patient expressed v/u of appointment time.

## 2022-02-22 ENCOUNTER — NURSE NAVIGATOR (OUTPATIENT)
Dept: OTHER | Facility: HOSPITAL | Age: 67
End: 2022-02-22

## 2022-02-22 NOTE — PROGRESS NOTES
Referral received from Dr. Isaac's office. I called Ms. Michaels and introduced myself and navigational services. She stated the plan is to have a mastectomy and she is waiting to hear her surgery date. She has a good understanding of her pathology and treatment options and is comfortable with her plan of care.     She stated she has a good support system in her  and daughter. Her daughter just went through breast cancer treatment last month and is recovering well. She stated the emotional toll has been hard, but stated she feels her support has been helpful in processing it.  We discussed that we have Supportive Oncology Services if the need arises. She declined the need for that at present.    We discussed integrative therapies and other services at the [facility]. She verbalized interest in receiving a navigation folder outlining services. I verified her mailing address and will send out a navigation folder with the following information:     [facility],  [facility] Breast Cancer Support Group, Cancer and Restorative Exercise (CARE), [facility] Exercise program, Guide for the Newly Diagnosed, [facility], [facility], Massage Therapy, Reiki Therapy, [facility], Cancer Nutrition, and Survivorship Clinic.    She verbalized appreciation for navigational services and she has my contact information and will call with any questions that arise.

## 2022-02-23 PROBLEM — C50.412 BREAST CANCER OF UPPER-OUTER QUADRANT OF LEFT FEMALE BREAST: Status: ACTIVE | Noted: 2022-02-23

## 2022-02-24 ENCOUNTER — TELEPHONE (OUTPATIENT)
Dept: SURGERY | Facility: CLINIC | Age: 67
End: 2022-02-24

## 2022-02-24 NOTE — TELEPHONE ENCOUNTER
Emla cream 30 G tube   No refills   Apply topically to affected nipple, outer edge of affected nipple and cover day of surgery before leaving home    [facility] Phone:  [phone]   Fax:  [phone]

## 2022-02-24 NOTE — TELEPHONE ENCOUNTER
Appointment with Dr. Montgomery is scheduled on 2/25/2022 @ 1:45pm.    Surgery is scheduled on 3/30/2022 @ 8:00am, SI @ 7:00am, patient arrival 5:15am.    PAT is scheduled on 3/23/2022 @ 9:30am.    Post-op appointment with Dr. Isaac is scheduled on 4/15/2022 @ 1:30pm.    Called and left message with patient about appointment times, I will relay appointment times to patient once she calls back.    Sent patient a reminder letter in the mail.

## 2022-02-25 ENCOUNTER — TELEPHONE (OUTPATIENT)
Dept: SURGERY | Facility: CLINIC | Age: 67
End: 2022-02-25

## 2022-02-25 NOTE — TELEPHONE ENCOUNTER
Initiated pre Auth for EMLA cream    Case number [id number]    Faxed clinicals as requested by [facility]

## 2022-03-07 ENCOUNTER — TELEPHONE (OUTPATIENT)
Dept: SURGERY | Facility: CLINIC | Age: 67
End: 2022-03-07

## 2022-03-07 NOTE — TELEPHONE ENCOUNTER
Caller: RAFAELA WALKER    Relationship:  SELF    Best call back number: [phone]    What is the best time to reach you: ANYTIME    Who are you requesting to speak with (clinical staff, provider,  specific staff member): CLINICAL STAFF     Do you know the name of the person who called: RAFAELA WALKER    What was the call regarding: PT HAS QUESTIONS ON UPCOMIING SURGERY ON 3-30-22.     Do you require a callback: YES, PLEASE CALL [phone]

## 2022-03-09 ENCOUNTER — NURSE NAVIGATOR (OUTPATIENT)
Dept: OTHER | Facility: HOSPITAL | Age: 67
End: 2022-03-09

## 2022-03-09 NOTE — PROGRESS NOTES
Called Ms. cardoso to see how she was doing. She stated she is doing well and has no needs at this time. She stated she received the navigation folder and has no questions or concerns. She was thankful for the call and will reach out if any questions or needs arise.

## 2022-03-19 PROCEDURE — U0004 COV-19 TEST NON-CDC HGH THRU: HCPCS | Performed by: FAMILY MEDICINE

## 2022-03-23 ENCOUNTER — PRE-ADMISSION TESTING (OUTPATIENT)
Dept: PREADMISSION TESTING | Facility: HOSPITAL | Age: 67
End: 2022-03-23

## 2022-03-23 ENCOUNTER — HOSPITAL ENCOUNTER (OUTPATIENT)
Dept: GENERAL RADIOLOGY | Facility: HOSPITAL | Age: 67
Discharge: HOME | End: 2022-03-23

## 2022-03-23 ENCOUNTER — NURSE NAVIGATOR (OUTPATIENT)
Dept: OTHER | Facility: HOSPITAL | Age: 67
End: 2022-03-23

## 2022-03-23 VITALS
HEART RATE: 61 BPM | OXYGEN SATURATION: 96 % | WEIGHT: 277 LBS | BODY MASS INDEX: 46.15 KG/M2 | HEIGHT: 65 IN | TEMPERATURE: 98.1 F | DIASTOLIC BLOOD PRESSURE: 81 MMHG | SYSTOLIC BLOOD PRESSURE: 138 MMHG | RESPIRATION RATE: 16 BRPM

## 2022-03-23 DIAGNOSIS — C50.412 BREAST CANCER OF UPPER-OUTER QUADRANT OF LEFT FEMALE BREAST: ICD-10-CM

## 2022-03-23 LAB
ALBUMIN SERPL-MCNC: 4.6 G/DL (ref 3.5–5.2)
ALBUMIN/GLOB SERPL: 1.5 G/DL
ALP SERPL-CCNC: 97 U/L (ref 39–117)
ALT SERPL W P-5'-P-CCNC: 42 U/L (ref 1–33)
ANION GAP SERPL CALCULATED.3IONS-SCNC: 8 MMOL/L (ref 5–15)
AST SERPL-CCNC: 35 U/L (ref 1–32)
BASOPHILS # BLD AUTO: 0.05 10*3/MM3 (ref 0–0.2)
BASOPHILS NFR BLD AUTO: 0.7 % (ref 0–1.5)
BILIRUB SERPL-MCNC: 0.5 MG/DL (ref 0–1.2)
BUN SERPL-MCNC: 20 MG/DL (ref 8–23)
BUN/CREAT SERPL: 18.9 (ref 7–25)
CALCIUM SPEC-SCNC: 9.7 MG/DL (ref 8.6–10.5)
CHLORIDE SERPL-SCNC: 103 MMOL/L (ref 98–107)
CO2 SERPL-SCNC: 31 MMOL/L (ref 22–29)
CREAT SERPL-MCNC: 1.06 MG/DL (ref 0.57–1)
DEPRECATED RDW RBC AUTO: 45.6 FL (ref 37–54)
EGFRCR SERPLBLD CKD-EPI 2021: 57.7 ML/MIN/1.73
EOSINOPHIL # BLD AUTO: 0.22 10*3/MM3 (ref 0–0.4)
EOSINOPHIL NFR BLD AUTO: 3.2 % (ref 0.3–6.2)
ERYTHROCYTE [DISTWIDTH] IN BLOOD BY AUTOMATED COUNT: 13.3 % (ref 12.3–15.4)
GLOBULIN UR ELPH-MCNC: 3 GM/DL
GLUCOSE SERPL-MCNC: 121 MG/DL (ref 65–99)
HCT VFR BLD AUTO: 47.8 % (ref 34–46.6)
HGB BLD-MCNC: 15.8 G/DL (ref 12–15.9)
IMM GRANULOCYTES # BLD AUTO: 0.03 10*3/MM3 (ref 0–0.05)
IMM GRANULOCYTES NFR BLD AUTO: 0.4 % (ref 0–0.5)
LYMPHOCYTES # BLD AUTO: 1.9 10*3/MM3 (ref 0.7–3.1)
LYMPHOCYTES NFR BLD AUTO: 27.3 % (ref 19.6–45.3)
MCH RBC QN AUTO: 30.9 PG (ref 26.6–33)
MCHC RBC AUTO-ENTMCNC: 33.1 G/DL (ref 31.5–35.7)
MCV RBC AUTO: 93.4 FL (ref 79–97)
MONOCYTES # BLD AUTO: 0.56 10*3/MM3 (ref 0.1–0.9)
MONOCYTES NFR BLD AUTO: 8 % (ref 5–12)
NEUTROPHILS NFR BLD AUTO: 4.21 10*3/MM3 (ref 1.7–7)
NEUTROPHILS NFR BLD AUTO: 60.4 % (ref 42.7–76)
NRBC BLD AUTO-RTO: 0 /100 WBC (ref 0–0.2)
PLATELET # BLD AUTO: 182 10*3/MM3 (ref 140–450)
PMV BLD AUTO: 11.3 FL (ref 6–12)
POTASSIUM SERPL-SCNC: 4.6 MMOL/L (ref 3.5–5.2)
PROT SERPL-MCNC: 7.6 G/DL (ref 6–8.5)
QT INTERVAL: 443 MS
RBC # BLD AUTO: 5.12 10*6/MM3 (ref 3.77–5.28)
SODIUM SERPL-SCNC: 142 MMOL/L (ref 136–145)
WBC NRBC COR # BLD: 6.97 10*3/MM3 (ref 3.4–10.8)

## 2022-03-23 PROCEDURE — 71046 X-RAY EXAM CHEST 2 VIEWS: CPT

## 2022-03-23 PROCEDURE — 36415 COLL VENOUS BLD VENIPUNCTURE: CPT

## 2022-03-23 PROCEDURE — 93005 ELECTROCARDIOGRAM TRACING: CPT

## 2022-03-23 PROCEDURE — 80053 COMPREHEN METABOLIC PANEL: CPT

## 2022-03-23 PROCEDURE — 85025 COMPLETE CBC W/AUTO DIFF WBC: CPT

## 2022-03-23 PROCEDURE — 93010 ELECTROCARDIOGRAM REPORT: CPT | Performed by: INTERNAL MEDICINE

## 2022-03-23 NOTE — PROGRESS NOTES
Met Ms. Michaels after her pre admission testing today. I introduced myself and navigational services. She is scheduled for a mastectomy with reconstruction on March 30th.  She has a good understanding of her pathology and treatment options and is comfortable with her plan of care.     She stated she has a good support system with her daughter also going through breast cancer treatment. She stated she has her good days and bad days, but is doing well overall. We discussed our supportive oncology clinic and she stated she would reach out if the need arises.      She stated she received her navigation folder in the mail the other week and was thankful for the information. She verbalized appreciation for navigational services and she has my contact information and will call with any questions that arise.

## 2022-03-23 NOTE — DISCHARGE INSTRUCTIONS
Take the following medications the morning of surgery:    NONE    ARRIVE AT 5:15    If you are on prescription narcotic pain medication to control your pain you may also take that medication the morning of surgery.    General Instructions:  Do not eat solid food after midnight the night before surgery.  You may drink clear liquids day of surgery but must stop at least one hour before your hospital arrival time.  It is beneficial for you to have a clear drink that contains carbohydrates the day of surgery.  We suggest a 12 to 20 ounce bottle of Gatorade or Powerade for non-diabetic patients or a 12 to 20 ounce bottle of G2 or Powerade Zero for diabetic patients. (Pediatric patients, are not advised to drink a 12 to 20 ounce carbohydrate drink)    Clear liquids are liquids you can see through.  Nothing red in color.     Plain water                               Sports drinks  Sodas                                   Gelatin (Jell-O)  Fruit juices without pulp such as white grape juice and apple juice  Popsicles that contain no fruit or yogurt  Tea or coffee (no cream or milk added)  Gatorade / Powerade  G2 / Powerade Zero     Patients who avoid smoking, chewing tobacco and alcohol for 4 weeks prior to surgery have a reduced risk of post-operative complications.  Quit smoking as many days before surgery as you can.  Do not smoke, use chewing tobacco or drink alcohol the day of surgery.   If applicable bring your C-PAP/ BI-PAP machine.  Bring any papers given to you in the doctor’s office.  Wear clean comfortable clothes.  Do not wear contact lenses, false eyelashes or make-up.  Bring a case for your glasses.   Bring crutches or walker if applicable.  Remove all piercings.  Leave jewelry and any other valuables at home.  Hair extensions with metal clips must be removed prior to surgery.  The Pre-Admission Testing nurse will instruct you to bring medications if unable to obtain an accurate list in Pre-Admission Testing.             Preventing a Surgical Site Infection:  For 2 to 3 days before surgery, avoid shaving with a razor because the razor can irritate skin and make it easier to develop an infection.    Any areas of open skin can increase the risk of a post-operative wound infection by allowing bacteria to enter and travel throughout the body.  Notify your surgeon if you have any skin wounds / rashes even if it is not near the expected surgical site.  The area will need assessed to determine if surgery should be delayed until it is healed.  The night prior to surgery shower using a fresh bar of anti-bacterial soap (such as Dial) and clean washcloth.  Sleep in a clean bed with clean clothing.  Do not allow pets to sleep with you.  Shower on the morning of surgery using a fresh bar of anti-bacterial soap (such as Dial) and clean washcloth.  Dry with a clean towel and dress in clean clothing.  Ask your surgeon if you will be receiving antibiotics prior to surgery.  Make sure you, your family, and all healthcare providers clean their hands with soap and water or an alcohol based hand  before caring for you or your wound.    Day of surgery:  Your arrival time is approximately two hours before your scheduled surgery time.  Upon arrival, a Pre-op nurse and Anesthesiologist will review your health history, obtain vital signs, and answer questions you may have.  The only belongings needed at this time will be a list of your home medications and if applicable your C-PAP/BI-PAP machine.  A Pre-op nurse will start an IV and you may receive medication in preparation for surgery, including something to help you relax.     Please be aware that surgery does come with discomfort.  We want to make every effort to control your discomfort so please discuss any uncontrolled symptoms with your nurse.   Your doctor will most likely have prescribed pain medications.      If you are going home after surgery you will receive individualized  written care instructions before being discharged.  A responsible adult must drive you to and from the hospital on the day of your surgery and stay with you for 24 hours.  Discharge prescriptions can be filled by the hospital pharmacy during regular pharmacy hours.  If you are having surgery late in the day/evening your prescription may be e-prescribed to your pharmacy.  Please verify your pharmacy hours or chose a 24 hour pharmacy to avoid not having access to your prescription because your pharmacy has closed for the day.    If you are staying overnight following surgery, you will be transported to your hospital room following the recovery period.  [facility] has all private rooms.    If you have any questions please call Pre-Admission Testing at [phone].  Deductibles and co-payments are collected on the day of service. Please be prepared to pay the required co-pay, deductible or deposit on the day of service as defined by your plan.    Patient Education for Self-Quarantine Process    Following your COVID testing, we strongly recommend that you wear a mask when you are with other people and practice social distancing.   Limit your activities to only required outings.  Wash your hands with soap and water frequently for at least 20 seconds.   Avoid touching your eyes, nose and mouth with unwashed hands.  Do not share anything - utensils, drinking glasses, food from the same bowl.   Sanitize household surfaces daily. Include all high touch areas (door handles, light switches, phones, countertops, etc.)    Call your surgeon immediately if you experience any of the following symptoms:  Sore Throat  Shortness of Breath or difficulty breathing  Cough  Chills  Body soreness or muscle pain  Headache  Fever  New loss of taste or smell  Do not arrive for your surgery ill.  Your procedure will need to be rescheduled to another time.  You will need to call your physician before the day of surgery to avoid  any unnecessary exposure to hospital staff as well as other patients.       CHLORHEXIDINE CLOTH INSTRUCTIONS  The morning of surgery follow these instructions using the Chlorhexidine cloths you've been given.  These steps reduce bacteria on the body.  Do not use the cloths near your eyes, ears mouth, genitalia or on open wounds.  Throw the cloths away after use but do not try to flush them down a toilet.      Open and remove one cloth at a time from the package.    Leave the cloth unfolded and begin the bathing.  Massage the skin with the cloths using gentle pressure to remove bacteria.  Do not scrub harshly.   Follow the steps below with one 2% CHG cloth per area (6 total cloths).  One cloth for neck, shoulders and chest.  One cloth for both arms, hands, fingers and underarms (do underarms last).  One cloth for the abdomen followed by groin.  One cloth for right leg and foot including between the toes.  One cloth for left leg and foot including between the toes.  The last cloth is to be used for the back of the neck, back and buttocks.    Allow the CHG to air dry 3 minutes on the skin which will give it time to work and decrease the chance of irritation.  The skin may feel sticky until it is dry.  Do not rinse with water or any other liquid or you will lose the beneficial effects of the CHG.  If mild skin irritation occurs, do rinse the skin to remove the CHG.  Report this to the nurse at time of admission.  Do not apply lotions, creams, ointments, deodorants or perfumes after using the clothes. Dress in clean clothes before coming to the hospital.

## 2022-03-29 ENCOUNTER — LAB (OUTPATIENT)
Dept: LAB | Facility: HOSPITAL | Age: 67
End: 2022-03-29

## 2022-03-29 DIAGNOSIS — Z00.00 ENCOUNTER FOR GENERAL ADULT MEDICAL EXAMINATION WITHOUT ABNORMAL FINDINGS: Primary | ICD-10-CM

## 2022-03-29 LAB — SARS-COV-2 ORF1AB RESP QL NAA+PROBE: NOT DETECTED

## 2022-03-29 PROCEDURE — C9803 HOPD COVID-19 SPEC COLLECT: HCPCS

## 2022-03-29 PROCEDURE — U0004 COV-19 TEST NON-CDC HGH THRU: HCPCS

## 2022-03-30 ENCOUNTER — ANESTHESIA EVENT (OUTPATIENT)
Dept: PERIOP | Facility: HOSPITAL | Age: 67
End: 2022-03-30

## 2022-03-30 ENCOUNTER — HOSPITAL ENCOUNTER (OUTPATIENT)
Facility: HOSPITAL | Age: 67
Discharge: HOME | End: 2022-03-31
Attending: SURGERY | Admitting: SURGERY

## 2022-03-30 ENCOUNTER — ANESTHESIA (OUTPATIENT)
Dept: PERIOP | Facility: HOSPITAL | Age: 67
End: 2022-03-30

## 2022-03-30 ENCOUNTER — TELEPHONE (OUTPATIENT)
Dept: SURGERY | Facility: CLINIC | Age: 67
End: 2022-03-30

## 2022-03-30 ENCOUNTER — HOSPITAL ENCOUNTER (OUTPATIENT)
Dept: NUCLEAR MEDICINE | Facility: HOSPITAL | Age: 67
Discharge: HOME | End: 2022-03-30

## 2022-03-30 DIAGNOSIS — C50.412 MALIGNANT NEOPLASM OF UPPER-OUTER QUADRANT OF LEFT FEMALE BREAST, UNSPECIFIED ESTROGEN RECEPTOR STATUS: ICD-10-CM

## 2022-03-30 DIAGNOSIS — C50.412 BREAST CANCER OF UPPER-OUTER QUADRANT OF LEFT FEMALE BREAST: ICD-10-CM

## 2022-03-30 PROCEDURE — 25010000002 FENTANYL CITRATE (PF) 50 MCG/ML SOLUTION: Performed by: NURSE ANESTHETIST, CERTIFIED REGISTERED

## 2022-03-30 PROCEDURE — 88341 IMHCHEM/IMCYTCHM EA ADD ANTB: CPT | Performed by: SURGERY

## 2022-03-30 PROCEDURE — 25010000002 ROPIVACAINE PER 1 MG: Performed by: PLASTIC SURGERY

## 2022-03-30 PROCEDURE — C1789 PROSTHESIS, BREAST, IMP: HCPCS | Performed by: SURGERY

## 2022-03-30 PROCEDURE — 88332 PATH CONSLTJ SURG EA ADD BLK: CPT | Performed by: SURGERY

## 2022-03-30 PROCEDURE — A9520 TC99 TILMANOCEPT DIAG 0.5MCI: HCPCS | Performed by: SURGERY

## 2022-03-30 PROCEDURE — 25010000002 HYDROMORPHONE PER 4 MG: Performed by: NURSE ANESTHETIST, CERTIFIED REGISTERED

## 2022-03-30 PROCEDURE — 88360 TUMOR IMMUNOHISTOCHEM/MANUAL: CPT | Performed by: SURGERY

## 2022-03-30 PROCEDURE — 19303 MAST SIMPLE COMPLETE: CPT | Performed by: SPECIALIST/TECHNOLOGIST, OTHER

## 2022-03-30 PROCEDURE — 25010000002 FENTANYL CITRATE (PF) 50 MCG/ML SOLUTION: Performed by: ANESTHESIOLOGY

## 2022-03-30 PROCEDURE — S0260 H&P FOR SURGERY: HCPCS | Performed by: SURGERY

## 2022-03-30 PROCEDURE — 38792 RA TRACER ID OF SENTINL NODE: CPT

## 2022-03-30 PROCEDURE — 19303 MAST SIMPLE COMPLETE: CPT | Performed by: SURGERY

## 2022-03-30 PROCEDURE — C1889 IMPLANT/INSERT DEVICE, NOC: HCPCS | Performed by: SURGERY

## 2022-03-30 PROCEDURE — 25010000002 DEXAMETHASONE PER 1 MG: Performed by: NURSE ANESTHETIST, CERTIFIED REGISTERED

## 2022-03-30 PROCEDURE — 78195 LYMPH SYSTEM IMAGING: CPT | Performed by: SURGERY

## 2022-03-30 PROCEDURE — 0 TECHETIUM TC99M TILMANOCEPT: Performed by: SURGERY

## 2022-03-30 PROCEDURE — G0378 HOSPITAL OBSERVATION PER HR: HCPCS

## 2022-03-30 PROCEDURE — 88331 PATH CONSLTJ SURG 1 BLK 1SPC: CPT | Performed by: SURGERY

## 2022-03-30 PROCEDURE — 25010000002 EPINEPHRINE PER 0.1 MG: Performed by: SURGERY

## 2022-03-30 PROCEDURE — 25010000002 ONDANSETRON PER 1 MG: Performed by: NURSE ANESTHETIST, CERTIFIED REGISTERED

## 2022-03-30 PROCEDURE — 25010000002 CEFAZOLIN PER 500 MG: Performed by: PLASTIC SURGERY

## 2022-03-30 PROCEDURE — 25010000002 PROPOFOL 10 MG/ML EMULSION: Performed by: NURSE ANESTHETIST, CERTIFIED REGISTERED

## 2022-03-30 PROCEDURE — 88307 TISSUE EXAM BY PATHOLOGIST: CPT | Performed by: SURGERY

## 2022-03-30 PROCEDURE — 88342 IMHCHEM/IMCYTCHM 1ST ANTB: CPT | Performed by: SURGERY

## 2022-03-30 PROCEDURE — 38525 BIOPSY/REMOVAL LYMPH NODES: CPT | Performed by: SURGERY

## 2022-03-30 PROCEDURE — 25010000002 GENTAMICIN PER 80 MG: Performed by: PLASTIC SURGERY

## 2022-03-30 PROCEDURE — 25010000002 NEOSTIGMINE 5 MG/10ML SOLUTION: Performed by: NURSE ANESTHETIST, CERTIFIED REGISTERED

## 2022-03-30 PROCEDURE — 25010000002 CEFAZOLIN PER 500 MG: Performed by: SURGERY

## 2022-03-30 DEVICE — CLIP LIGAT VASC HORIZON TI MD BLU 24CT: Type: IMPLANTABLE DEVICE | Site: BREAST | Status: FUNCTIONAL

## 2022-03-30 DEVICE — EXPNDR TISS BRST MODHT XPROJ STL 133S MX/T 700CC: Type: IMPLANTABLE DEVICE | Site: BREAST | Status: FUNCTIONAL

## 2022-03-30 DEVICE — SEALANT WND FIBRIN TISSEEL PREFIL/SYR/PRIMAFZ 4ML: Type: IMPLANTABLE DEVICE | Site: BREAST | Status: FUNCTIONAL

## 2022-03-30 DEVICE — CLIP LIGAT VASC HORIZON TI SM/WD RED 24CT: Type: IMPLANTABLE DEVICE | Site: BREAST | Status: FUNCTIONAL

## 2022-03-30 DEVICE — GRFT TISS ALLODERM RTU 128SQ/CM MD 1.6TO0.4MM: Type: IMPLANTABLE DEVICE | Site: BREAST | Status: FUNCTIONAL

## 2022-03-30 RX ORDER — NALOXONE HCL 0.4 MG/ML
0.1 VIAL (ML) INJECTION
Status: DISCONTINUED | OUTPATIENT
Start: 2022-03-30 | End: 2022-03-31 | Stop reason: HOSPADM

## 2022-03-30 RX ORDER — SODIUM CHLORIDE, SODIUM LACTATE, POTASSIUM CHLORIDE, CALCIUM CHLORIDE 600; 310; 30; 20 MG/100ML; MG/100ML; MG/100ML; MG/100ML
100 INJECTION, SOLUTION INTRAVENOUS CONTINUOUS
Status: DISCONTINUED | OUTPATIENT
Start: 2022-03-30 | End: 2022-03-30

## 2022-03-30 RX ORDER — ONDANSETRON 8 MG/1
8 TABLET, ORALLY DISINTEGRATING ORAL EVERY 6 HOURS PRN
Status: DISCONTINUED | OUTPATIENT
Start: 2022-03-30 | End: 2022-03-31 | Stop reason: HOSPADM

## 2022-03-30 RX ORDER — DIPHENHYDRAMINE HYDROCHLORIDE 50 MG/ML
12.5 INJECTION INTRAMUSCULAR; INTRAVENOUS
Status: DISCONTINUED | OUTPATIENT
Start: 2022-03-30 | End: 2022-03-30

## 2022-03-30 RX ORDER — MIDAZOLAM HYDROCHLORIDE 1 MG/ML
0.5 INJECTION INTRAMUSCULAR; INTRAVENOUS
Status: DISCONTINUED | OUTPATIENT
Start: 2022-03-30 | End: 2022-03-30 | Stop reason: HOSPADM

## 2022-03-30 RX ORDER — DOXYCYCLINE 100 MG/1
100 CAPSULE ORAL EVERY 12 HOURS SCHEDULED
Status: DISCONTINUED | OUTPATIENT
Start: 2022-03-30 | End: 2022-03-31 | Stop reason: HOSPADM

## 2022-03-30 RX ORDER — SODIUM CHLORIDE 0.9 % (FLUSH) 0.9 %
3 SYRINGE (ML) INJECTION EVERY 12 HOURS SCHEDULED
Status: DISCONTINUED | OUTPATIENT
Start: 2022-03-30 | End: 2022-03-31 | Stop reason: HOSPADM

## 2022-03-30 RX ORDER — EPHEDRINE SULFATE 50 MG/ML
INJECTION, SOLUTION INTRAVENOUS AS NEEDED
Status: DISCONTINUED | OUTPATIENT
Start: 2022-03-30 | End: 2022-03-30 | Stop reason: SURG

## 2022-03-30 RX ORDER — ONDANSETRON 2 MG/ML
4 INJECTION INTRAMUSCULAR; INTRAVENOUS EVERY 6 HOURS PRN
Status: DISCONTINUED | OUTPATIENT
Start: 2022-03-30 | End: 2022-03-31 | Stop reason: HOSPADM

## 2022-03-30 RX ORDER — GLYCOPYRROLATE 0.2 MG/ML
INJECTION INTRAMUSCULAR; INTRAVENOUS AS NEEDED
Status: DISCONTINUED | OUTPATIENT
Start: 2022-03-30 | End: 2022-03-30 | Stop reason: SURG

## 2022-03-30 RX ORDER — NEOSTIGMINE METHYLSULFATE 0.5 MG/ML
INJECTION, SOLUTION INTRAVENOUS AS NEEDED
Status: DISCONTINUED | OUTPATIENT
Start: 2022-03-30 | End: 2022-03-30 | Stop reason: SURG

## 2022-03-30 RX ORDER — SODIUM CHLORIDE, SODIUM LACTATE, POTASSIUM CHLORIDE, CALCIUM CHLORIDE 600; 310; 30; 20 MG/100ML; MG/100ML; MG/100ML; MG/100ML
9 INJECTION, SOLUTION INTRAVENOUS CONTINUOUS
Status: DISCONTINUED | OUTPATIENT
Start: 2022-03-30 | End: 2022-03-30

## 2022-03-30 RX ORDER — SPIRONOLACTONE 25 MG/1
25 TABLET ORAL DAILY
Status: DISCONTINUED | OUTPATIENT
Start: 2022-03-31 | End: 2022-03-31 | Stop reason: HOSPADM

## 2022-03-30 RX ORDER — DIAZEPAM 5 MG/1
5 TABLET ORAL ONCE
Status: COMPLETED | OUTPATIENT
Start: 2022-03-30 | End: 2022-03-30

## 2022-03-30 RX ORDER — SODIUM CHLORIDE 0.9 % (FLUSH) 0.9 %
3-10 SYRINGE (ML) INJECTION AS NEEDED
Status: DISCONTINUED | OUTPATIENT
Start: 2022-03-30 | End: 2022-03-31 | Stop reason: HOSPADM

## 2022-03-30 RX ORDER — HYDRALAZINE HYDROCHLORIDE 20 MG/ML
5 INJECTION INTRAMUSCULAR; INTRAVENOUS
Status: DISCONTINUED | OUTPATIENT
Start: 2022-03-30 | End: 2022-03-30

## 2022-03-30 RX ORDER — ROPIVACAINE HYDROCHLORIDE 5 MG/ML
INJECTION, SOLUTION EPIDURAL; INFILTRATION; PERINEURAL AS NEEDED
Status: DISCONTINUED | OUTPATIENT
Start: 2022-03-30 | End: 2022-03-30 | Stop reason: HOSPADM

## 2022-03-30 RX ORDER — IBUPROFEN 600 MG/1
600 TABLET ORAL ONCE AS NEEDED
Status: DISCONTINUED | OUTPATIENT
Start: 2022-03-30 | End: 2022-03-30

## 2022-03-30 RX ORDER — HYDROMORPHONE HYDROCHLORIDE 1 MG/ML
0.25 INJECTION, SOLUTION INTRAMUSCULAR; INTRAVENOUS; SUBCUTANEOUS
Status: DISCONTINUED | OUTPATIENT
Start: 2022-03-30 | End: 2022-03-31 | Stop reason: HOSPADM

## 2022-03-30 RX ORDER — HYDROCODONE BITARTRATE AND ACETAMINOPHEN 7.5; 325 MG/1; MG/1
1 TABLET ORAL ONCE AS NEEDED
Status: DISCONTINUED | OUTPATIENT
Start: 2022-03-30 | End: 2022-03-30

## 2022-03-30 RX ORDER — PROMETHAZINE HYDROCHLORIDE 25 MG/1
25 TABLET ORAL ONCE AS NEEDED
Status: DISCONTINUED | OUTPATIENT
Start: 2022-03-30 | End: 2022-03-30

## 2022-03-30 RX ORDER — HYDROMORPHONE HYDROCHLORIDE 1 MG/ML
0.5 INJECTION, SOLUTION INTRAMUSCULAR; INTRAVENOUS; SUBCUTANEOUS
Status: DISCONTINUED | OUTPATIENT
Start: 2022-03-30 | End: 2022-03-30

## 2022-03-30 RX ORDER — LABETALOL HYDROCHLORIDE 5 MG/ML
5 INJECTION, SOLUTION INTRAVENOUS
Status: DISCONTINUED | OUTPATIENT
Start: 2022-03-30 | End: 2022-03-30

## 2022-03-30 RX ORDER — DEXAMETHASONE SODIUM PHOSPHATE 10 MG/ML
INJECTION INTRAMUSCULAR; INTRAVENOUS AS NEEDED
Status: DISCONTINUED | OUTPATIENT
Start: 2022-03-30 | End: 2022-03-30 | Stop reason: SURG

## 2022-03-30 RX ORDER — OXYCODONE HYDROCHLORIDE 5 MG/1
5 TABLET ORAL EVERY 4 HOURS PRN
Status: DISCONTINUED | OUTPATIENT
Start: 2022-03-30 | End: 2022-03-31 | Stop reason: HOSPADM

## 2022-03-30 RX ORDER — ONDANSETRON 2 MG/ML
INJECTION INTRAMUSCULAR; INTRAVENOUS AS NEEDED
Status: DISCONTINUED | OUTPATIENT
Start: 2022-03-30 | End: 2022-03-30 | Stop reason: SURG

## 2022-03-30 RX ORDER — ONDANSETRON 2 MG/ML
4 INJECTION INTRAMUSCULAR; INTRAVENOUS ONCE AS NEEDED
Status: DISCONTINUED | OUTPATIENT
Start: 2022-03-30 | End: 2022-03-30

## 2022-03-30 RX ORDER — SODIUM CHLORIDE 0.9 % (FLUSH) 0.9 %
3-10 SYRINGE (ML) INJECTION AS NEEDED
Status: DISCONTINUED | OUTPATIENT
Start: 2022-03-30 | End: 2022-03-30 | Stop reason: HOSPADM

## 2022-03-30 RX ORDER — SODIUM CHLORIDE 0.9 % (FLUSH) 0.9 %
3 SYRINGE (ML) INJECTION EVERY 12 HOURS SCHEDULED
Status: DISCONTINUED | OUTPATIENT
Start: 2022-03-30 | End: 2022-03-30 | Stop reason: HOSPADM

## 2022-03-30 RX ORDER — FENTANYL CITRATE 50 UG/ML
50 INJECTION, SOLUTION INTRAMUSCULAR; INTRAVENOUS
Status: DISCONTINUED | OUTPATIENT
Start: 2022-03-30 | End: 2022-03-30

## 2022-03-30 RX ORDER — TRANEXAMIC ACID 100 MG/ML
INJECTION, SOLUTION INTRAVENOUS AS NEEDED
Status: DISCONTINUED | OUTPATIENT
Start: 2022-03-30 | End: 2022-03-30 | Stop reason: HOSPADM

## 2022-03-30 RX ORDER — DIPHENHYDRAMINE HCL 25 MG
25 CAPSULE ORAL
Status: DISCONTINUED | OUTPATIENT
Start: 2022-03-30 | End: 2022-03-30

## 2022-03-30 RX ORDER — FLUMAZENIL 0.1 MG/ML
0.2 INJECTION INTRAVENOUS AS NEEDED
Status: DISCONTINUED | OUTPATIENT
Start: 2022-03-30 | End: 2022-03-30

## 2022-03-30 RX ORDER — ACETAMINOPHEN 325 MG/1
650 TABLET ORAL EVERY 4 HOURS PRN
Status: DISCONTINUED | OUTPATIENT
Start: 2022-03-30 | End: 2022-03-31 | Stop reason: HOSPADM

## 2022-03-30 RX ORDER — PROPRANOLOL HYDROCHLORIDE 160 MG/1
160 CAPSULE, EXTENDED RELEASE ORAL NIGHTLY
Status: DISCONTINUED | OUTPATIENT
Start: 2022-03-30 | End: 2022-03-31 | Stop reason: HOSPADM

## 2022-03-30 RX ORDER — ONDANSETRON 4 MG/1
4 TABLET, FILM COATED ORAL EVERY 6 HOURS PRN
Status: DISCONTINUED | OUTPATIENT
Start: 2022-03-30 | End: 2022-03-31 | Stop reason: HOSPADM

## 2022-03-30 RX ORDER — OXYCODONE AND ACETAMINOPHEN 7.5; 325 MG/1; MG/1
1 TABLET ORAL EVERY 4 HOURS PRN
Status: DISCONTINUED | OUTPATIENT
Start: 2022-03-30 | End: 2022-03-30

## 2022-03-30 RX ORDER — ROCURONIUM BROMIDE 10 MG/ML
INJECTION, SOLUTION INTRAVENOUS AS NEEDED
Status: DISCONTINUED | OUTPATIENT
Start: 2022-03-30 | End: 2022-03-30 | Stop reason: SURG

## 2022-03-30 RX ORDER — FENTANYL CITRATE 50 UG/ML
50 INJECTION, SOLUTION INTRAMUSCULAR; INTRAVENOUS
Status: DISCONTINUED | OUTPATIENT
Start: 2022-03-30 | End: 2022-03-30 | Stop reason: HOSPADM

## 2022-03-30 RX ORDER — LIDOCAINE HYDROCHLORIDE 10 MG/ML
0.5 INJECTION, SOLUTION EPIDURAL; INFILTRATION; INTRACAUDAL; PERINEURAL ONCE AS NEEDED
Status: DISCONTINUED | OUTPATIENT
Start: 2022-03-30 | End: 2022-03-30 | Stop reason: HOSPADM

## 2022-03-30 RX ORDER — CEFAZOLIN SODIUM IN 0.9 % NACL 3 G/100 ML
3 INTRAVENOUS SOLUTION, PIGGYBACK (ML) INTRAVENOUS ONCE
Status: COMPLETED | OUTPATIENT
Start: 2022-03-30 | End: 2022-03-30

## 2022-03-30 RX ORDER — FAMOTIDINE 10 MG/ML
20 INJECTION, SOLUTION INTRAVENOUS ONCE
Status: COMPLETED | OUTPATIENT
Start: 2022-03-30 | End: 2022-03-30

## 2022-03-30 RX ORDER — PROMETHAZINE HYDROCHLORIDE 25 MG/1
25 SUPPOSITORY RECTAL ONCE AS NEEDED
Status: DISCONTINUED | OUTPATIENT
Start: 2022-03-30 | End: 2022-03-30

## 2022-03-30 RX ORDER — EPHEDRINE SULFATE 50 MG/ML
5 INJECTION, SOLUTION INTRAVENOUS ONCE AS NEEDED
Status: DISCONTINUED | OUTPATIENT
Start: 2022-03-30 | End: 2022-03-30

## 2022-03-30 RX ORDER — LIDOCAINE HYDROCHLORIDE 20 MG/ML
INJECTION, SOLUTION INFILTRATION; PERINEURAL AS NEEDED
Status: DISCONTINUED | OUTPATIENT
Start: 2022-03-30 | End: 2022-03-30 | Stop reason: SURG

## 2022-03-30 RX ORDER — NALOXONE HCL 0.4 MG/ML
0.2 VIAL (ML) INJECTION AS NEEDED
Status: DISCONTINUED | OUTPATIENT
Start: 2022-03-30 | End: 2022-03-30

## 2022-03-30 RX ORDER — FIBRINOGEN HUMAN AND THROMBIN HUMAN 90; 500 [IU]/ML; [IU]/ML
SOLUTION TOPICAL AS NEEDED
Status: DISCONTINUED | OUTPATIENT
Start: 2022-03-30 | End: 2022-03-30 | Stop reason: HOSPADM

## 2022-03-30 RX ORDER — PROPOFOL 10 MG/ML
VIAL (ML) INTRAVENOUS AS NEEDED
Status: DISCONTINUED | OUTPATIENT
Start: 2022-03-30 | End: 2022-03-30 | Stop reason: SURG

## 2022-03-30 RX ADMIN — HYDROMORPHONE HYDROCHLORIDE 0.5 MG: 1 INJECTION, SOLUTION INTRAMUSCULAR; INTRAVENOUS; SUBCUTANEOUS at 12:33

## 2022-03-30 RX ADMIN — OXYCODONE HYDROCHLORIDE AND ACETAMINOPHEN 1 TABLET: 7.5; 325 TABLET ORAL at 13:11

## 2022-03-30 RX ADMIN — Medication 3 ML: at 22:59

## 2022-03-30 RX ADMIN — FENTANYL CITRATE 50 MCG: 0.05 INJECTION, SOLUTION INTRAMUSCULAR; INTRAVENOUS at 11:30

## 2022-03-30 RX ADMIN — DIAZEPAM 5 MG: 5 TABLET ORAL at 06:22

## 2022-03-30 RX ADMIN — FENTANYL CITRATE 25 MCG: 0.05 INJECTION, SOLUTION INTRAMUSCULAR; INTRAVENOUS at 11:25

## 2022-03-30 RX ADMIN — GLYCOPYRROLATE 0.5 MG: 0.2 INJECTION INTRAMUSCULAR; INTRAVENOUS at 09:11

## 2022-03-30 RX ADMIN — LIDOCAINE HYDROCHLORIDE 100 MG: 20 INJECTION, SOLUTION INFILTRATION; PERINEURAL at 08:05

## 2022-03-30 RX ADMIN — FENTANYL CITRATE 25 MCG: 0.05 INJECTION, SOLUTION INTRAMUSCULAR; INTRAVENOUS at 08:43

## 2022-03-30 RX ADMIN — NEOSTIGMINE METHYLSULFATE 3.5 MG: 0.5 INJECTION INTRAVENOUS at 09:11

## 2022-03-30 RX ADMIN — OXYCODONE 5 MG: 5 TABLET ORAL at 17:15

## 2022-03-30 RX ADMIN — FAMOTIDINE 20 MG: 10 INJECTION INTRAVENOUS at 07:29

## 2022-03-30 RX ADMIN — SODIUM CHLORIDE, POTASSIUM CHLORIDE, SODIUM LACTATE AND CALCIUM CHLORIDE: 600; 310; 30; 20 INJECTION, SOLUTION INTRAVENOUS at 11:06

## 2022-03-30 RX ADMIN — FENTANYL CITRATE 50 MCG: 50 INJECTION INTRAMUSCULAR; INTRAVENOUS at 12:19

## 2022-03-30 RX ADMIN — ROCURONIUM BROMIDE 30 MG: 50 INJECTION INTRAVENOUS at 08:43

## 2022-03-30 RX ADMIN — EPHEDRINE SULFATE 10 MG: 50 INJECTION INTRAVENOUS at 08:56

## 2022-03-30 RX ADMIN — CEFAZOLIN SODIUM 3 G: 10 INJECTION, POWDER, FOR SOLUTION INTRAVENOUS at 07:46

## 2022-03-30 RX ADMIN — GLYCOPYRROLATE 0.2 MG: 0.2 INJECTION INTRAMUSCULAR; INTRAVENOUS at 08:57

## 2022-03-30 RX ADMIN — OXYCODONE 5 MG: 5 TABLET ORAL at 21:18

## 2022-03-30 RX ADMIN — PROPRANOLOL HYDROCHLORIDE 160 MG: 160 CAPSULE, EXTENDED RELEASE ORAL at 22:59

## 2022-03-30 RX ADMIN — SODIUM CHLORIDE, POTASSIUM CHLORIDE, SODIUM LACTATE AND CALCIUM CHLORIDE 9 ML/HR: 600; 310; 30; 20 INJECTION, SOLUTION INTRAVENOUS at 07:29

## 2022-03-30 RX ADMIN — DEXAMETHASONE SODIUM PHOSPHATE 10 MG: 10 INJECTION INTRAMUSCULAR; INTRAVENOUS at 08:10

## 2022-03-30 RX ADMIN — DOXYCYCLINE 100 MG: 100 CAPSULE ORAL at 22:59

## 2022-03-30 RX ADMIN — HYDROMORPHONE HYDROCHLORIDE 0.5 MG: 1 INJECTION, SOLUTION INTRAMUSCULAR; INTRAVENOUS; SUBCUTANEOUS at 12:58

## 2022-03-30 RX ADMIN — ROCURONIUM BROMIDE 50 MG: 50 INJECTION INTRAVENOUS at 08:05

## 2022-03-30 RX ADMIN — ONDANSETRON 4 MG: 2 INJECTION INTRAMUSCULAR; INTRAVENOUS at 08:10

## 2022-03-30 RX ADMIN — PROPOFOL 200 MG: 10 INJECTION, EMULSION INTRAVENOUS at 08:05

## 2022-03-30 RX ADMIN — EPHEDRINE SULFATE 5 MG: 50 INJECTION INTRAVENOUS at 09:34

## 2022-03-30 RX ADMIN — TILMANOCEPT 1 DOSE: KIT at 07:02

## 2022-03-30 NOTE — ANESTHESIA PREPROCEDURE EVALUATION
Anesthesia Evaluation     Patient summary reviewed   no history of anesthetic complications:  NPO Solid Status: > 8 hours  NPO Liquid Status: > 2 hours           Airway   Mallampati: II  TM distance: >3 FB  Neck ROM: full  No difficulty expected  Dental - normal exam     Pulmonary     breath sounds clear to auscultation  (+) sleep apnea on CPAP,   (-) shortness of breath, recent URI, not a smoker  Cardiovascular   Exercise tolerance: good (4-7 METS)    Rhythm: regular  Rate: normal    (+) hypertension,   (-) past MI, dysrhythmias, angina      Neuro/Psych  (+) psychiatric history Anxiety and Depression,    (-) seizures, CVA  GI/Hepatic/Renal/Endo    (+) morbid obesity, hiatal hernia,    (-) no renal disease, diabetes    Musculoskeletal     (-) neck stiffness  Abdominal    Substance History      OB/GYN          Other      history of cancer (Lbreast)                    Anesthesia Plan    ASA 3     general     intravenous induction     Anesthetic plan, all risks, benefits, and alternatives have been provided, discussed and informed consent has been obtained with: patient.    Plan discussed with CRNA.        CODE STATUS:

## 2022-03-30 NOTE — ANESTHESIA POSTPROCEDURE EVALUATION
"Patient: Mary Ann Michaels    Procedure Summary     Date: 03/30/22 Room / Location: [facility] OR  / [facility] MAIN OR    Anesthesia Start: 0751 Anesthesia Stop: 1143    Procedures:       left total mastectomy with left axillary sentinel lymph node biopsy (Left Breast)      LEFT PLACEMENT TISSUE EXPANDER AND ALLODERM (Left Breast) Diagnosis:       Breast cancer of upper-outer quadrant of left female breast (HCC)      (Breast cancer of upper-outer quadrant of left female breast (HCC) [C50.412])    Surgeons: Catherine Isaac MD; Raj Montgomery MD Provider: Benjamin Bernal DO    Anesthesia Type: general ASA Status: 3          Anesthesia Type: general    Vitals  Vitals Value Taken Time   /84 03/30/22 1301   Temp 37 °C (98.6 °F) 03/30/22 1140   Pulse 59 03/30/22 1315   Resp 18 03/30/22 1245   SpO2 96 % 03/30/22 1315   Vitals shown include unvalidated device data.        Post Anesthesia Care and Evaluation    Patient location during evaluation: bedside  Patient participation: complete - patient participated  Level of consciousness: awake and alert  Pain score: 0  Pain management: adequate  Airway patency: patent  Anesthetic complications: No anesthetic complications  PONV Status: none  Cardiovascular status: acceptable  Respiratory status: acceptable  Hydration status: acceptable    Comments: /96   Pulse 60   Temp 37 °C (98.6 °F) (Oral)   Resp 18   Ht 165.1 cm (65\")   Wt 127 kg (280 lb 4.8 oz)   LMP  (LMP Unknown)   SpO2 96%   BMI 46.64 kg/m²         "

## 2022-03-30 NOTE — ANESTHESIA PROCEDURE NOTES
Airway  Urgency: elective    Date/Time: 3/30/2022 8:09 AM  Difficult airway    General Information and Staff    Patient location during procedure: OR  Anesthesiologist: Benjamin Bernal DO  CRNA: Dakotah Jimenez CRNA    Indications and Patient Condition  Indications for airway management: airway protection    Preoxygenated: yes  MILS maintained throughout  Mask difficulty assessment: 3 - difficult mask (inadequate, unstable or two providers) +/- NMBA    Final Airway Details  Final airway type: endotracheal airway      Successful airway: ETT  Cuffed: yes   Successful intubation technique: direct laryngoscopy  Facilitating devices/methods: intubating stylet  Endotracheal tube insertion site: oral  Blade: Pierce  Blade size: 3  ETT size (mm): 7.0  Cormack-Lehane Classification: grade III - view of epiglottis only  Placement verified by: chest auscultation and capnometry   Measured from: gums  ETT/EBT to gums (cm): 21  Number of attempts at approach: 1  Assessment: lips, teeth, and gum same as pre-op and atraumatic intubation

## 2022-03-30 NOTE — TELEPHONE ENCOUNTER
[employer] genetic panel March 28, 2022 breast and gynecology, pancreas and liver.  Returned as no mutations.  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  I will let her know this morning before surgery.

## 2022-03-31 ENCOUNTER — TELEPHONE (OUTPATIENT)
Dept: SURGERY | Facility: CLINIC | Age: 67
End: 2022-03-31

## 2022-03-31 VITALS
WEIGHT: 280.3 LBS | HEIGHT: 65 IN | SYSTOLIC BLOOD PRESSURE: 127 MMHG | RESPIRATION RATE: 18 BRPM | OXYGEN SATURATION: 92 % | BODY MASS INDEX: 46.7 KG/M2 | HEART RATE: 53 BPM | TEMPERATURE: 97.7 F | DIASTOLIC BLOOD PRESSURE: 69 MMHG

## 2022-03-31 PROCEDURE — G0378 HOSPITAL OBSERVATION PER HR: HCPCS

## 2022-03-31 PROCEDURE — 25010000002 ENOXAPARIN PER 10 MG: Performed by: PLASTIC SURGERY

## 2022-03-31 PROCEDURE — 99024 POSTOP FOLLOW-UP VISIT: CPT | Performed by: SURGERY

## 2022-03-31 RX ORDER — ACETAMINOPHEN 325 MG/1
650 TABLET ORAL EVERY 4 HOURS PRN
Qty: 60 TABLET | Refills: 0 | Status: SHIPPED | OUTPATIENT
Start: 2022-03-31 | End: 2022-08-19

## 2022-03-31 RX ORDER — ONDANSETRON 8 MG/1
8 TABLET, ORALLY DISINTEGRATING ORAL EVERY 8 HOURS PRN
Qty: 10 TABLET | Refills: 1 | Status: SHIPPED | OUTPATIENT
Start: 2022-03-31 | End: 2022-08-19

## 2022-03-31 RX ORDER — AMOXICILLIN 250 MG
2 CAPSULE ORAL DAILY PRN
Qty: 30 TABLET | Refills: 1 | Status: SHIPPED | OUTPATIENT
Start: 2022-03-31 | End: 2022-08-19

## 2022-03-31 RX ORDER — HYDROCODONE BITARTRATE AND ACETAMINOPHEN 5; 325 MG/1; MG/1
1-2 TABLET ORAL EVERY 4 HOURS PRN
Qty: 20 TABLET | Refills: 0 | Status: SHIPPED | OUTPATIENT
Start: 2022-03-31 | End: 2022-08-19

## 2022-03-31 RX ORDER — DOXYCYCLINE 100 MG/1
100 CAPSULE ORAL 2 TIMES DAILY
Qty: 10 CAPSULE | Refills: 0 | Status: SHIPPED | OUTPATIENT
Start: 2022-03-31 | End: 2022-04-05

## 2022-03-31 RX ORDER — DOCUSATE SODIUM 250 MG
250 CAPSULE ORAL 2 TIMES DAILY PRN
Qty: 60 CAPSULE | Refills: 1 | Status: SHIPPED | OUTPATIENT
Start: 2022-03-31 | End: 2022-08-19

## 2022-03-31 RX ORDER — GABAPENTIN 100 MG/1
100 CAPSULE ORAL EVERY 8 HOURS
Qty: 60 CAPSULE | Refills: 2 | Status: SHIPPED | OUTPATIENT
Start: 2022-03-31 | End: 2022-04-27

## 2022-03-31 RX ADMIN — OXYCODONE 5 MG: 5 TABLET ORAL at 11:57

## 2022-03-31 RX ADMIN — SPIRONOLACTONE 25 MG: 25 TABLET, FILM COATED ORAL at 08:50

## 2022-03-31 RX ADMIN — ENOXAPARIN SODIUM 40 MG: 100 INJECTION SUBCUTANEOUS at 08:50

## 2022-03-31 RX ADMIN — OXYCODONE 5 MG: 5 TABLET ORAL at 04:33

## 2022-03-31 RX ADMIN — DOXYCYCLINE 100 MG: 100 CAPSULE ORAL at 08:50

## 2022-03-31 NOTE — TELEPHONE ENCOUNTER
[employer] was not scheduled pre op for patient. I have scheduled this with [facility] PT Friday 4/15 2:00 pm following post op with Dr. Isaac    Confirmed

## 2022-04-01 ENCOUNTER — TELEPHONE (OUTPATIENT)
Dept: SURGERY | Facility: CLINIC | Age: 67
End: 2022-04-01

## 2022-04-01 DIAGNOSIS — C50.412 MALIGNANT NEOPLASM OF UPPER-OUTER QUADRANT OF LEFT BREAST IN FEMALE, ESTROGEN RECEPTOR POSITIVE: Primary | ICD-10-CM

## 2022-04-01 DIAGNOSIS — Z17.0 MALIGNANT NEOPLASM OF UPPER-OUTER QUADRANT OF LEFT BREAST IN FEMALE, ESTROGEN RECEPTOR POSITIVE: Primary | ICD-10-CM

## 2022-04-01 NOTE — TELEPHONE ENCOUNTER
Pathology from left total mastectomy and sentinel lymph node biopsy dated March 30, 2022: In the left mastectomy 2 foci of invasive lobular carcinoma, lobular carcinoma in situ and atypical lobular hyperplasia.  The larger focus is 6 cm.  Invasive lobular carcinoma, low-grade, 3, 1, 1, margins clear.  Smaller focus is 3.5 mm, invasive lobular, low-grade, 3, 1, 1, comes to within 1 mm of the anterior skin surface that was removed.  Therefore this was not a true margin.  All other margins are widely clear.  0 of 7 sentinel nodes.  Pathologic stage MPT3N0 stage IIb.  I called and let her know.  We will arrange for her to see radiation oncology, medical oncology, and will send an Oncotype.      Final Diagnosis   1. Lymph Node, Left [place] Node #1, Excision (2 mm Slices):                A. Single benign lymph node.     2. Lymph Node, Left [place] Node #2, Excision (2 mm Slices):                A. Single benign lymph node.     3. Lymph Node, Left [place] Node #3, Excision (2 mm Slices):                A. Single benign lymph node.     4. Lymph Node, Left [place] Node #4, Excision (2 mm Slices):                A. Two benign lymph nodes.     5. Lymph Node, Left [place] Node #5, Excision (2 mm Slices):                A. Two benign lymph nodes.     6. Breast, Left, Total Mastectomy: Two foci of invasive lobular carcinoma, lobular carcinoma in situ and atypical          lobular hyperplasia.               A. The largest focus of invasive lobular carcinoma.                            1. The tumor is Kenneth grade I (tubular grade 3, nuclear grade 1, mitotic grade 1).                            2. The tumor measures up to 60 mm (six consecutive 1 cm slices).                            3. The tumor is seen near both the coil clip and V-shaped clips in the area of 2 o'clock but the clips are        not in the actual tumor.   4. The tumor comes within 13 mm of the anterior skin surface, 24 mm of the posterior margin, 60 mm  of        the lateral and inferior margins, 90 mm of the superior margin and 110 mm of the medial margin.  5. Microcalcifications are associated with the invasive tumor.  B. The smaller focus of invasive lobular carcinoma.               1. The focus is located in the nipple.               2. The focus measures up to 3.5 mm.   3. The focus is Kenneth grade I (tubular grade 3, nuclear grade 1, mitotic grade 1).   4. The invasive focus comes within 1 mm of the anterior skin surface and 100 mm of the posterior        margin, 140 mm of the superior margin, 60 mm of the inferior margin, 90 mm of the medial        margin and 130 mm of the lateral margin.                              Comment: The invasive tumor overall comes with 1 mm of the skin not a true margin, 24 mm of posterior margin,   60 mm of the inferior and lateral margins and 90 mm of the superior and medial margins.Hormone receptors were performed on the previous case [id number] on two separate sites revealing essentially the same findings with ER positive (99%), VT positive (99%), HER2 negative at 1+ and Ki67 12-13%. Those results are reported in the synoptic report. The 3.5 mm tumor in the nipple which does appear separate will have hormone receptors reported in the Biomarker template.The two tumors appear identical. The tumor in the nipple was clearly separate from the other tumor, slice 11 having no tumor in it, and the tumor in the nipple being in slice 10 and the other tumor with the most medial aspect being in slice 12.      julio/pkm    Electronically signed by Kyle Samuels MD on 3/31/2022 at 1612   Synoptic Checklist     INVASIVE CARCINOMA OF THE BREAST: Resection  8th Edition - Protocol posted: 12/17/2021  INVASIVE CARCINOMA OF THE BREAST: COMPLETE EXCISION - All Specimens  SPECIMEN   Procedure  Total mastectomy    Specimen Laterality  Left    TUMOR   Tumor Site  Upper outer quadrant    Histologic Type  Invasive lobular carcinoma    Histologic  Grade (Kenneth Histologic Score)     Glandular (Acinar) / Tubular Differentiation  Score 3    Nuclear Pleomorphism  Score 1    Mitotic Rate  Score 1    Overall Grade  Grade 1 (scores of 3, 4 or 5)    Tumor Size  Greatest dimension of largest invasive focus (Millimeters): 60 mm   Tumor Focality  Multiple foci of invasive carcinoma    Number of Foci  2         Sizes of Individual Foci (Millimeters)  60 mm and 3.5 mm mm   Ductal Carcinoma In Situ (DCIS)  Not identified    Lobular Carcinoma In Situ (LCIS)  Present         Tumor Extent  Skin is present and involved    Skin Invasion  Invasive carcinoma directly invades into the dermis or epidermis without skin ulceration (this does not change the T classification)    Skin Satellite Foci  Satellite foci not identified    Lymphovascular Invasion  Not identified    Dermal Lymphovascular Invasion  Not identified    Microcalcifications  Present in invasive carcinoma    Treatment Effect in the Breast  No known presurgical therapy    MARGINS   Margin Status for Invasive Carcinoma  All margins negative for invasive carcinoma    Distance from Invasive Carcinoma to Closest Margin  24 mm   Closest Margin(s) to Invasive Carcinoma  Posterior    Distance from Invasive Carcinoma to Anterior Margin  skin comes within 1mm of surface    Distance from Invasive Carcinoma to Posterior Margin  24 mm   Distance from Invasive Carcinoma to Superior Margin  90 mm   Distance from Invasive Carcinoma to Inferior Margin  60 mm   Distance from Invasive Carcinoma to Medial Margin  90 mm   Distance from Invasive Carcinoma to Lateral Margin  60 mm   REGIONAL LYMPH NODES   Regional Lymph Node Status  All regional lymph nodes negative for tumor    Total Number of Lymph Nodes Examined (sentinel and non-sentinel)  7    Number of [place] Nodes Examined  7    PATHOLOGIC STAGE CLASSIFICATION (pTNM, AJCC 8th Edition)   Reporting of pT, pN, and (when applicable) pM categories is based on information available  to the pathologist at the time the report is issued. As per the AJCC (Chapter 1, 8th Ed.) it is the managing physician’s responsibility to establish the final pathologic stage based upon all pertinent information, including but potentially not limited to this pathology report.   TNM Descriptors  m (multiple foci of invasive carcinoma)    pT Category  pT3    pN Category  pN0    SPECIAL STUDIES        Estrogen Receptor (ER) Status  Positive (greater than 10% of cells demonstrate nuclear positivity)    Percentage of Cells with Nuclear Positivity  99 %        Progesterone Receptor (PgR) Status  Positive    Percentage of Cells with Nuclear Positivity  99 %        HER2 (by immunohistochemistry)  Negative (Score 1+)         Ki-67 Percentage of Positive Nuclei  13 %   Testing Performed on Case Number      .     Breast Biomarker Reporting Template  Protocol posted: 11/10/2021  BREAST: BIOMARKER REPORTING TEMPLATE - All Specimens  Test(s) Performed     Estrogen Receptor (ER) Status  Positive (greater than 10% of cells demonstrate nuclear positivity)    Percentage of Cells with Nuclear Positivity  99 %   Average Intensity of Staining  Strong    Test Type  Food and Drug Administration (FDA) cleared (test / vendor): [facility]    Primary Antibody  SP1    Scoring System  Lauren    Proportion Score  5    Intensity Score  3    Total Lauren Score  8    Test(s) Performed     Progesterone Receptor (PgR) Status  Positive    Percentage of Cells with Nuclear Positivity  99 %   Average Intensity of Staining  Strong    Test Type  Food and Drug Administration (FDA) cleared (test / vendor): [facility]    Primary Antibody  1E2    Scoring System  Lauren    Proportion Score  5    Intensity Score  3    Total Lauren Score  8    Test(s) Performed     HER2 by Immunohistochemistry  Negative (Score 1+)    Test Type  Food and Drug Administration (FDA) cleared (test / vendor): [facility]    Primary Antibody  4B5    Test(s) Performed  Ki-67    Percentage  of Cells with Nuclear Positivity  8 %   Cold Ischemia and Fixation Times  Meet requirements specified in latest version of the ASCO / CAP Guidelines    Cold Ischemia Time (minutes)  60 min   Fixation Time (hours)  10 hours   Testing Performed on Block Number(s)  6A    METHODS   Fixative  Formalin    Image Analysis  Not performed    Comment(s)  This is the smaller tumor

## 2022-04-04 ENCOUNTER — NURSE NAVIGATOR (OUTPATIENT)
Dept: OTHER | Facility: HOSPITAL | Age: 67
End: 2022-04-04

## 2022-04-04 NOTE — PROGRESS NOTES
Called Ms. Michaels to see how she was doing. She stated she is recovering well from surgery and has minimal pain. She has no needs or concerns at this time. She was thankful for the call and will reach out if any questions or needs arise.

## 2022-04-14 ENCOUNTER — TELEPHONE (OUTPATIENT)
Dept: SURGERY | Facility: CLINIC | Age: 67
End: 2022-04-14

## 2022-04-14 NOTE — TELEPHONE ENCOUNTER
Post op apt with Dr. Isaac rescheduled Tuesday 4/19 10:00.     I had to leave patient a message to call back and confirm

## 2022-04-15 ENCOUNTER — APPOINTMENT (OUTPATIENT)
Dept: PHYSICAL THERAPY | Facility: HOSPITAL | Age: 67
End: 2022-04-15

## 2022-04-19 ENCOUNTER — OFFICE VISIT (OUTPATIENT)
Dept: SURGERY | Facility: CLINIC | Age: 67
End: 2022-04-19

## 2022-04-19 ENCOUNTER — TELEPHONE (OUTPATIENT)
Dept: SURGERY | Facility: CLINIC | Age: 67
End: 2022-04-19

## 2022-04-19 VITALS
OXYGEN SATURATION: 98 % | WEIGHT: 270 LBS | BODY MASS INDEX: 44.98 KG/M2 | DIASTOLIC BLOOD PRESSURE: 84 MMHG | HEART RATE: 74 BPM | SYSTOLIC BLOOD PRESSURE: 168 MMHG | HEIGHT: 65 IN

## 2022-04-19 DIAGNOSIS — R92.8 ABNORMAL MRI, BREAST: ICD-10-CM

## 2022-04-19 DIAGNOSIS — Z80.0 FH: PANCREATIC CANCER: ICD-10-CM

## 2022-04-19 DIAGNOSIS — E66.01 MORBID (SEVERE) OBESITY DUE TO EXCESS CALORIES: ICD-10-CM

## 2022-04-19 DIAGNOSIS — Z80.41 FH: OVARIAN CANCER: ICD-10-CM

## 2022-04-19 DIAGNOSIS — Z17.0 MALIGNANT NEOPLASM OF UPPER-OUTER QUADRANT OF LEFT BREAST IN FEMALE, ESTROGEN RECEPTOR POSITIVE: Primary | ICD-10-CM

## 2022-04-19 DIAGNOSIS — Z80.3 FH: BREAST CANCER: ICD-10-CM

## 2022-04-19 DIAGNOSIS — C50.412 MALIGNANT NEOPLASM OF UPPER-OUTER QUADRANT OF LEFT BREAST IN FEMALE, ESTROGEN RECEPTOR POSITIVE: Primary | ICD-10-CM

## 2022-04-19 PROCEDURE — 99024 POSTOP FOLLOW-UP VISIT: CPT | Performed by: SURGERY

## 2022-04-19 NOTE — PROGRESS NOTES
Chief Complaint: Rafaela Walker is a 66 y.o. female who was seen in consultation at the request of SAMANTA Rodney  for newly diagnosed breast cancer and a postoperative visit    History of Present Illness:  Patient presents with newly diagnosed breast cancer LEFT breast. She noted no new masses, skin changes, nipple discharge, nipple changes prior to her most recent imaging.  Her most recent imaging includes the followin2017 RIGHT BREAST US     PRIORITY RADIOLOGY     RAFAELA WALKER  Right breast, 10:00 position, 9 cm from the nipple, there is a 15 x 9 x 18 mm cyst. This corresponds to the mammographic abnormality.  BI-RADS 2: Benign.    PROGRESS NOTES       RAFAELA WALKER  Urinalysis.     2021 BILATERAL SCREENING MAMMO WITH LOW   PRIORITY RADIOLOGY    RAFAELA WALKER  Scattered areas of fibroglandular density. A previously described cyst in the 9:00 posterior right breast, best depicted on prior diagnostic ultrasound from , has significantly diminished in size. Within the left breast, there is a new single view subcentimeter focal asymmetry superiorly posterior third depth on the MLO view.  BI-RADS 0.    2021 LEFT DIAGNOSTIC MAMMO WITH LOW & LEFT BREAST US     PRIORITY     RAFAELA WALKER  MMG:  Scattered areas of fibroglandular density. 6 mm indistinct mass in the outer, slightly upper left breast, posterior depth.  US:  Corresponding to the left breast mass on mammogram, there is a 5 x 4 x 5 mm indistinct hypoechoic mass 2:00, 9 cm from the nipple.  BI-RADS 4.      She had a biopsy on the following day that showed:     2022 LEFT US GUIDED BREAST BIOPSY      PRIORITY RADIOLOGY       RAFAELA WALKER  ADDENDUM:  The imaging and mammographic findings are concordant. I would recommend consideration of diagnostic breast MRI, given the multifocality.  Left breast 2:00 position approximately 9 cm the nipple, a 5 x 4 x 3 mm hypoechoic shadowing focus was demonstrated. A 12 gauge 5 cm guide was  advanced to the periphery of this nodule. Through the guide, six 2.3 cm 14 gauge core needle specimens were obtained. A [place] clip was placed in the nodule. 2 view post procedure mammogram demonstrated that this did not represent the intended target. This is designated as lesion #1. The clip is located in the more anterior position the upper outer left breast than the target lesion. Subsequently, the left breast was prepped and draped in usual sterile fashion, with attention to more posterior position closer to the axillary tail. A hypoechoic nodule was localized in the 2:00 position 15 cm from the nipple measuring 5 x 6 x 6 mm, at this location. Four 2.3 cm 14 gauge core needle specimens were obtained. A Hydramark #4 clip was placed in this nodule. 2 view post-surgery mammogram demonstrated appropriate clip placement in the target lesion, corresponding to previous diagnostic mammogram and screening mammogram abnormality. This is designated as lesion #2.  PATHOLOGY:  IHC HER2/lelia Report, Addendum:  Specimen #1:  HER2/lelia by IHC has returned Negative (1+)  Specimen #2:  HER2/lelia by IHC has returned Negative (1+)  FINAL DIAGNOSIS:  Specimen #1:  Invasive lobular carcinoma (tubule score 3, nuclear score 1, mitotic score 1) total 5/9. Largest tumor focus is 5 mm.  ER: 100%  TX: 100%  HER2: 1+  Specimen #2 (Left breast, 2 o’clock, 9 cm from the nipple, core biopsy):  Invasive lobular carcinoma (tubule score 3, nuclear score 1, mitotic score 1) total 5/9. Largest tumor focus is 4 mm. Focal atypical lobular hyperplasia.  ER: Strong, 100%  TX: Strong, 100%  HER2: 1+  OUTSIDE PATHOLOGY CONSULTATION (1/24/22):  1. Left Breast at 2 o’clock, 15 cm from Nipple:  A. Invasive lobular carcinoma  1. Overall [place] grade I (tubular score 3, nuclear score 1, mitotic score 1)  2. Invasive carcinoma measures at least 5 mm.  3. No lymphovascular space invasion.  B. No in situ carcinoma identified.  ER: 99%, positive  TX: 99%,  positive  HER2: Score 1+  Ki-67: 12%  2. Left Breast at 2 o’clock, 9 cm from Nipple, core biopsy:  A. Invasive lobular carcinoma  1. Overall Kenneth grade II (tubular score 3, nuclear score 2, mitotic score 1)  2. Invasive carcinoma measures at least 4 mm in greatest dimension.  3. No lymphovascular space invasion.  B. Focal atypical lobular hyperplasia (ALH) noted.  ER: 99%, positive  [place]: 99%, positive  HER2: Score 1+  Ki-67: 13%    I then arranged for a MRI:      01/31/2022 BILATERAL BREAST MRI          [facility]         RAFAELA AARON  Left breast 2 o’clock posterior one-third 10 cm from the nipple there is a focal signal void. This corresponds to the V-shaped metallic clip. No abnormal enhancement is seen in the immediate vicinity of the V-shaped metallic clip. This metallic clip is on the order of 2.8 cm posterior to an area of clumped enhancement that measures on the order of 5.2 cm in the anterior to posterior, 4.3 cm in the medial to lateral, 3.2 cm in the superior to inferior which corresponds to an area of prominent parenchyma that can be seen on the provided mammograms in the middle third upper outer quadrant of the left breast. Additionally, there is a second more posteriorly located metallic clip at the 2 o’clock axis on the order of 14 cm from the nipple that corresponds to a coil-shaped metallic clip. This metallic clip is immediately adjacent to an enhancing lesion that measures 0.9 cm in the anterior to posterior dimension, 0.6 cm in the superior-inferior dimension and 0.6 cm in the medial to lateral dimension which represents biopsy-proven site of malignancy. No other areas of abnormal enhancement left breast. No evidence for left axillary or internal mammary chain adenopathy. There are no areas of abnormal enhancement right breast.        She has not had a breast biopsy in the past.  She has had her uterus and ovaries removed in 1999, is postmenopausal, and takes nor hormones.  Her family history  includes the following: She has 1 daughter, 1 son, 2 paternal half-sisters, no aunts on either side of the family.  Her mother had ovarian cancer at 67, her maternal grandmother had pancreas cancer at 71, 2 of her brothers had liver cancer but abused alcohol and drugs, and a daughter who had DCIS at age 49 who is a patient of Dr. Smiley.      Interval HIstory:  Pathology from left total mastectomy and sentinel lymph node biopsy dated March 30, 2022: In the left mastectomy 2 foci of invasive lobular carcinoma, lobular carcinoma in situ and atypical lobular hyperplasia.  The larger focus is 6 cm.  Invasive lobular carcinoma, low-grade, 3, 1, 1, margins clear.  Smaller focus is 3.5 mm, invasive lobular, low-grade, 3, 1, 1, comes to within 1 mm of the anterior skin surface that was removed.  Therefore this was not a true margin.  All other margins are widely clear.  0 of 7 sentinel nodes.  Pathologic stage MPT3N0 stage IIb.    She had her last drain removed yesterday.  Denies redness, warmth, drainage from incision.    She is here to review.    Review of Systems:  Review of Systems   Eyes: Eye problems: cataracts.   All other systems reviewed and are negative.       Past Medical and Surgical History:  Breast Biopsy History:  Patient had not had a breast biopsy prior to her cancer diagnosis.  Breast Cancer HIstory:  Patient does not have a past medical history of breast cancer.  Breast Operations, and year:  None   Obstetric/Gynecologic History:  Age menstrual periods began: 11  Patient is postmenopausal due to removal of her uterus and both ovaries in the following year: age 39   Number of pregnancies: 3  Number of live births: 2  Number of abortions or miscarriages: 1  Age of delivery of first child: 18  Patient did not breast feed.  Length of time taking birth control pills: 3 yrs   Patient has never taken hormone replacement    Patient had both ovaries and uterus removed.   Past Surgical History:   Procedure  "Laterality Date   • BREAST RECONSTRUCTION Left 3/30/2022    Procedure: LEFT PLACEMENT TISSUE EXPANDER AND ALLODERM;  Surgeon: Raj Montgomery MD;  Location: [facility];  Service: Plastics;  Laterality: Left;   •  SECTION     • CHOLECYSTECTOMY     • HYSTERECTOMY     • KNEE SURGERY Right     ARTHROSCOPY TENDON REPAIR   • MASTECTOMY W/ SENTINEL NODE BIOPSY Left 3/30/2022    Procedure: left total mastectomy with left axillary sentinel lymph node biopsy;  Surgeon: Catherine Isaac MD;  Location: [facility];  Service: General;  Laterality: Left;     Past Medical History:   Diagnosis Date   • Anxiety and depression    • Female stress incontinence    • Hiatal hernia    • History of left breast cancer    • Hypertension    • Insomnia    • Sleep apnea     CPAP       Prior Hospitalizations, other than for surgery or childbirth, and year:  None     Social History     Socioeconomic History   • Marital status:    Tobacco Use   • Smoking status: Never Smoker   • Smokeless tobacco: Never Used   Vaping Use   • Vaping Use: Never used   Substance and Sexual Activity   • Alcohol use: Never   • Drug use: Never   • Sexual activity: Defer     Patient is .  Patient is employed full time with the following occupation: nurse    Patient drinks 1 servings of caffeine per day.    Family History:  Family History   Problem Relation Age of Onset   • Ovarian cancer Mother 67   • Lung cancer Father    • Liver cancer Brother    • Liver cancer Brother    • Pancreatic cancer Maternal Grandmother    • Malig Hyperthermia Neg Hx        Vital Signs:  /84   Pulse 74   Ht 165.1 cm (65\")   Wt 122 kg (270 lb)   LMP  (LMP Unknown)   SpO2 98%   Breastfeeding No   BMI 44.93 kg/m²      Medications:    Current Outpatient Medications:   •  acetaminophen (TYLENOL) 325 MG tablet, Take 2 tablets by mouth Every 4 (Four) Hours As Needed for Mild Pain ., Disp: 60 tablet, Rfl: 0  •  citalopram (CeleXA) 40 MG " "tablet, Take 1 tablet by mouth daily. (Patient taking differently: Take 40 mg by mouth Every Night.), Disp: 30 tablet, Rfl: 5  •  gabapentin (Neurontin) 100 MG capsule, Take 1 capsule by mouth Every 8 (Eight) Hours., Disp: 60 capsule, Rfl: 2  •  propranolol LA (INDERAL LA) 160 MG 24 hr capsule, Take 1 capsule by mouth Every Night., Disp: 90 capsule, Rfl: 3  •  zolpidem (AMBIEN) 5 MG tablet, Take 1 tablet by mouth nightly as needed for Sleep, Disp: 30 tablet, Rfl: 2  •  docusate sodium (COLACE) 250 MG capsule, Take 1 capsule by mouth 2 (Two) Times a Day As Needed for Constipation., Disp: 60 capsule, Rfl: 1  •  HYDROcodone-acetaminophen (NORCO) 5-325 MG per tablet, Take 1-2 tablets by mouth Every 4 (Four) Hours As Needed (Pain)., Disp: 20 tablet, Rfl: 0  •  ondansetron ODT (Zofran ODT) 8 MG disintegrating tablet, Place 1 tablet on the tongue Every 8 (Eight) Hours As Needed for Nausea or Vomiting., Disp: 10 tablet, Rfl: 1  •  sennosides-docusate (PERICOLACE) 8.6-50 MG per tablet, Take 2 tablets by mouth Daily As Needed for Constipation., Disp: 30 tablet, Rfl: 1  •  spironolactone (ALDACTONE) 25 MG tablet, Take 1 tablet by mouth daily., Disp: 90 tablet, Rfl: 1  •  zolpidem (AMBIEN) 5 MG tablet, Take 5 mg by mouth At Night As Needed., Disp: , Rfl:      Allergies:  No Known Allergies    Physical Examination:  /84   Pulse 74   Ht 165.1 cm (65\")   Wt 122 kg (270 lb)   LMP  (LMP Unknown)   SpO2 98%   Breastfeeding No   BMI 44.93 kg/m²   General Appearance:  Patient is in no distress.  She is well kept and has an morbidly obese build.   Psychiatric:  Patient with appropriate mood and affect. Alert and oriented to self, time, and place.    Breast, RIGHT:  large sized, asymmetric with the contralateral side as below.  Breast skin is without erythema, edema, rashes.  There are no visible abnormalities upon inspection during the arm-raising maneuver or with hands on hips in the sitting position. There is no nipple " retraction, discharge or nipple/areolar skin changes.There are no masses palpable in the sitting or supine positions.    Breast, LEFT: Surgically absent with well healing transverse incision and a partially expanded tissue expander. No erythema, warmth drainage and no undrained fluid collections.      Lymphatic:  There is no axillary, cervical, infraclavicular, or supraclavicular adenopathy bilaterally.  Eyes:  Pupils are round and reactive to light.  Cardiovascular:  Heart rate and rhythm are regular.  Respiratory:  Lungs are clear bilaterally with no crackles or wheezes in any lung field.  Gastrointestinal:  Abdomen is soft, nondistended, and nontender.     Musculoskeletal:  Good strength in all 4 extremities.   There is good range of motion in both shoulders.    Skin:  No new skin lesions or rashes on the skin excluding the breast (see breast exam above).        Imagin2016 BILATERAL SCREENING MAMMO WITH LOW    PRIORITY RADIOLOGY    RAFAELA WALKER  Scattered areas fibroglandular density.  BI-RADS 1: Negative.    2017 BILATERAL SCREENING MAMMO WITH LOW     PRIORITY RADIOLOGY      RAFAELA WALKER  Scattered fibroglandular density. In the right breast there is a well-circumscribed lobular oval equal density mass measuring 1.6 cm.  BI-RADS 0.    2017 RIGHT BREAST US     PRIORITY RADIOLOGY     RAFAELA WALKER  Right breast, 10:00 position, 9 cm from the nipple, there is a 15 x 9 x 18 mm cyst. This corresponds to the mammographic abnormality.  BI-RADS 2: Benign.    PROGRESS NOTES       RAFAELA WALKER  Urinalysis.     2021 BILATERAL SCREENING MAMMO WITH LOW   PRIORITY RADIOLOGY    RAFAELA WALKER  Scattered areas of fibroglandular density. A previously described cyst in the 9:00 posterior right breast, best depicted on prior diagnostic ultrasound from 2017, has significantly diminished in size. Within the left breast, there is a new single view subcentimeter focal asymmetry superiorly posterior third depth  on the MLO view.  BI-RADS 0.    12/17/2021 LEFT DIAGNOSTIC MAMMO WITH LOW & LEFT BREAST US     PRIORITY     RAFAELAFABRICIO COTTONB  MMG:  Scattered areas of fibroglandular density. 6 mm indistinct mass in the outer, slightly upper left breast, posterior depth.  US:  Corresponding to the left breast mass on mammogram, there is a 5 x 4 x 5 mm indistinct hypoechoic mass 2:00, 9 cm from the nipple.  BI-RADS 4.      01/31/2022 BILATERAL BREAST MRI          [facility]         RAFAELA AARNO  Left breast 2 o’clock posterior one-third 10 cm from the nipple there is a focal signal void. This corresponds to the V-shaped metallic clip. No abnormal enhancement is seen in the immediate vicinity of the V-shaped metallic clip. This metallic clip is on the order of 2.8 cm posterior to an area of clumped enhancement that measures on the order of 5.2 cm in the anterior to posterior, 4.3 cm in the medial to lateral, 3.2 cm in the superior to inferior which corresponds to an area of prominent parenchyma that can be seen on the provided mammograms in the middle third upper outer quadrant of the left breast. Additionally, there is a second more posteriorly located metallic clip at the 2 o’clock axis on the order of 14 cm from the nipple that corresponds to a coil-shaped metallic clip. This metallic clip is immediately adjacent to an enhancing lesion that measures 0.9 cm in the anterior to posterior dimension, 0.6 cm in the superior-inferior dimension and 0.6 cm in the medial to lateral dimension which represents biopsy-proven site of malignancy. No other areas of abnormal enhancement left breast. No evidence for left axillary or internal mammary chain adenopathy. There are no areas of abnormal enhancement right breast.        Pathology:    01/12/2022 LEFT US GUIDED BREAST BIOPSY      PRIORITY RADIOLOGY       RAFAELAFABRICIO COTTONB  ADDENDUM:  The imaging and mammographic findings are concordant. I would recommend consideration of diagnostic breast MRI, given the  multifocality.  Left breast 2:00 position approximately 9 cm the nipple, a 5 x 4 x 3 mm hypoechoic shadowing focus was demonstrated. A 12 gauge 5 cm guide was advanced to the periphery of this nodule. Through the guide, six 2.3 cm 14 gauge core needle specimens were obtained. A [place] clip was placed in the nodule. 2 view post procedure mammogram demonstrated that this did not represent the intended target. This is designated as lesion #1. The clip is located in the more anterior position the upper outer left breast than the target lesion. Subsequently, the left breast was prepped and draped in usual sterile fashion, with attention to more posterior position closer to the axillary tail. A hypoechoic nodule was localized in the 2:00 position 15 cm from the nipple measuring 5 x 6 x 6 mm, at this location. Four 2.3 cm 14 gauge core needle specimens were obtained. A Hydramark #4 clip was placed in this nodule. 2 view post-surgery mammogram demonstrated appropriate clip placement in the target lesion, corresponding to previous diagnostic mammogram and screening mammogram abnormality. This is designated as lesion #2.  PATHOLOGY:  IHC HER2/lelia Report, Addendum:  Specimen #1:  HER2/lelia by IHC has returned Negative (1+)  Specimen #2:  HER2/lelia by IHC has returned Negative (1+)  FINAL DIAGNOSIS:  Specimen #1:  Invasive lobular carcinoma (tubule score 3, nuclear score 1, mitotic score 1) total 5/9. Largest tumor focus is 5 mm.  ER: 100%  TX: 100%  HER2: 1+  Specimen #2 (Left breast, 2 o’clock, 9 cm from the nipple, core biopsy):  Invasive lobular carcinoma (tubule score 3, nuclear score 1, mitotic score 1) total 5/9. Largest tumor focus is 4 mm. Focal atypical lobular hyperplasia.  ER: Strong, 100%  TX: Strong, 100%  HER2: 1+  OUTSIDE PATHOLOGY CONSULTATION (1/24/22):  1. Left Breast at 2 o’clock, 15 cm from Nipple:  A. Invasive lobular carcinoma  1. Overall Kenneth grade I (tubular score 3, nuclear score 1, mitotic score  1)  2. Invasive carcinoma measures at least 5 mm.  3. No lymphovascular space invasion.  B. No in situ carcinoma identified.  ER: 99%, positive  IA: 99%, positive  HER2: Score 1+  Ki-67: 12%  2. Left Breast at 2 o’clock, 9 cm from Nipple, core biopsy:  A. Invasive lobular carcinoma  1. Overall Kenneth grade II (tubular score 3, nuclear score 2, mitotic score 1)  2. Invasive carcinoma measures at least 4 mm in greatest dimension.  3. No lymphovascular space invasion.  B. Focal atypical lobular hyperplasia (ALH) noted.  ER: 99%, positive  IA: 99%, positive  HER2: Score 1+  Ki-67: 13%      Pathology from left total mastectomy and sentinel lymph node biopsy dated March 30, 2022: In the left mastectomy 2 foci of invasive lobular carcinoma, lobular carcinoma in situ and atypical lobular hyperplasia.  The larger focus is 6 cm.  Invasive lobular carcinoma, low-grade, 3, 1, 1, margins clear.  Smaller focus is 3.5 mm, invasive lobular, low-grade, 3, 1, 1, comes to within 1 mm of the anterior skin surface that was removed.  Therefore this was not a true margin.  All other margins are widely clear.  0 of 7 sentinel nodes.  Pathologic stage MPT3N0 stage IIb.  I called and let her know.  We will arrange for her to see radiation oncology, medical oncology, and will send an Oncotype.        Final Diagnosis   1. Lymph Node, Left [place] Node #1, Excision (2 mm Slices):                A. Single benign lymph node.     2. Lymph Node, Left [place] Node #2, Excision (2 mm Slices):                A. Single benign lymph node.     3. Lymph Node, Left [place] Node #3, Excision (2 mm Slices):                A. Single benign lymph node.     4. Lymph Node, Left [place] Node #4, Excision (2 mm Slices):                A. Two benign lymph nodes.     5. Lymph Node, Left [place] Node #5, Excision (2 mm Slices):                A. Two benign lymph nodes.     6. Breast, Left, Total Mastectomy: Two foci of invasive lobular carcinoma, lobular  carcinoma in situ and atypical          lobular hyperplasia.               A. The largest focus of invasive lobular carcinoma.                            1. The tumor is Kenneth grade I (tubular grade 3, nuclear grade 1, mitotic grade 1).                            2. The tumor measures up to 60 mm (six consecutive 1 cm slices).                            3. The tumor is seen near both the coil clip and V-shaped clips in the area of 2 o'clock but the clips are        not in the actual tumor.   4. The tumor comes within 13 mm of the anterior skin surface, 24 mm of the posterior margin, 60 mm of        the lateral and inferior margins, 90 mm of the superior margin and 110 mm of the medial margin.  5. Microcalcifications are associated with the invasive tumor.  B. The smaller focus of invasive lobular carcinoma.               1. The focus is located in the nipple.               2. The focus measures up to 3.5 mm.   3. The focus is [place] grade I (tubular grade 3, nuclear grade 1, mitotic grade 1).   4. The invasive focus comes within 1 mm of the anterior skin surface and 100 mm of the posterior        margin, 140 mm of the superior margin, 60 mm of the inferior margin, 90 mm of the medial        margin and 130 mm of the lateral margin.                              Comment: The invasive tumor overall comes with 1 mm of the skin not a true margin, 24 mm of posterior margin,   60 mm of the inferior and lateral margins and 90 mm of the superior and medial margins.Hormone receptors were performed on the previous case [id number] on two separate sites revealing essentially the same findings with ER positive (99%), LA positive (99%), HER2 negative at 1+ and Ki67 12-13%. Those results are reported in the synoptic report. The 3.5 mm tumor in the nipple which does appear separate will have hormone receptors reported in the Biomarker template.The two tumors appear identical. The tumor in the nipple was clearly separate from  the other tumor, slice 11 having no tumor in it, and the tumor in the nipple being in slice 10 and the other tumor with the most medial aspect being in slice 12.      julio/pkm    Electronically signed by yKle Samuels MD on 3/31/2022 at 1612   Synoptic Checklist      INVASIVE CARCINOMA OF THE BREAST: Resection  8th Edition - Protocol posted: 12/17/2021  INVASIVE CARCINOMA OF THE BREAST: COMPLETE EXCISION - All Specimens       SPECIMEN   Procedure   Total mastectomy    Specimen Laterality   Left    TUMOR   Tumor Site   Upper outer quadrant    Histologic Type   Invasive lobular carcinoma    Histologic Grade (Kenneth Histologic Score)       Glandular (Acinar) / Tubular Differentiation   Score 3    Nuclear Pleomorphism   Score 1    Mitotic Rate   Score 1    Overall Grade   Grade 1 (scores of 3, 4 or 5)    Tumor Size   Greatest dimension of largest invasive focus (Millimeters): 60 mm   Tumor Focality   Multiple foci of invasive carcinoma    Number of Foci   2            Sizes of Individual Foci (Millimeters)   60 mm and 3.5 mm mm   Ductal Carcinoma In Situ (DCIS)   Not identified    Lobular Carcinoma In Situ (LCIS)   Present            Tumor Extent   Skin is present and involved    Skin Invasion   Invasive carcinoma directly invades into the dermis or epidermis without skin ulceration (this does not change the T classification)    Skin Satellite Foci   Satellite foci not identified    Lymphovascular Invasion   Not identified    Dermal Lymphovascular Invasion   Not identified    Microcalcifications   Present in invasive carcinoma    Treatment Effect in the Breast   No known presurgical therapy    MARGINS   Margin Status for Invasive Carcinoma   All margins negative for invasive carcinoma    Distance from Invasive Carcinoma to Closest Margin   24 mm   Closest Margin(s) to Invasive Carcinoma   Posterior    Distance from Invasive Carcinoma to Anterior Margin   skin comes within 1mm of surface    Distance from Invasive  Carcinoma to Posterior Margin   24 mm   Distance from Invasive Carcinoma to Superior Margin   90 mm   Distance from Invasive Carcinoma to Inferior Margin   60 mm   Distance from Invasive Carcinoma to Medial Margin   90 mm   Distance from Invasive Carcinoma to Lateral Margin   60 mm   REGIONAL LYMPH NODES   Regional Lymph Node Status   All regional lymph nodes negative for tumor    Total Number of Lymph Nodes Examined (sentinel and non-sentinel)   7    Number of [place] Nodes Examined   7    PATHOLOGIC STAGE CLASSIFICATION (pTNM, AJCC 8th Edition)   Reporting of pT, pN, and (when applicable) pM categories is based on information available to the pathologist at the time the report is issued. As per the AJCC (Chapter 1, 8th Ed.) it is the managing physician’s responsibility to establish the final pathologic stage based upon all pertinent information, including but potentially not limited to this pathology report.   TNM Descriptors   m (multiple foci of invasive carcinoma)    pT Category   pT3    pN Category   pN0    SPECIAL STUDIES           Estrogen Receptor (ER) Status   Positive (greater than 10% of cells demonstrate nuclear positivity)    Percentage of Cells with Nuclear Positivity   99 %           Progesterone Receptor (PgR) Status   Positive    Percentage of Cells with Nuclear Positivity   99 %           HER2 (by immunohistochemistry)   Negative (Score 1+)            Ki-67 Percentage of Positive Nuclei   13 %   Testing Performed on Case Number       .      Breast Biomarker Reporting Template  Protocol posted: 11/10/2021  BREAST: BIOMARKER REPORTING TEMPLATE - All Specimens  Test(s) Performed       Estrogen Receptor (ER) Status   Positive (greater than 10% of cells demonstrate nuclear positivity)    Percentage of Cells with Nuclear Positivity   99 %   Average Intensity of Staining   Strong    Test Type   Food and Drug Administration (FDA) cleared (test / vendor): [facility]    Primary Antibody   SP1    Scoring  System   Lauren    Proportion Score   5    Intensity Score   3    Total Lauren Score   8    Test(s) Performed       Progesterone Receptor (PgR) Status   Positive    Percentage of Cells with Nuclear Positivity   99 %   Average Intensity of Staining   Strong    Test Type   Food and Drug Administration (FDA) cleared (test / vendor): [facility]    Primary Antibody   1E2    Scoring System   Lauren    Proportion Score   5    Intensity Score   3    Total Lauren Score   8    Test(s) Performed       HER2 by Immunohistochemistry   Negative (Score 1+)    Test Type   Food and Drug Administration (FDA) cleared (test / vendor): [facility]    Primary Antibody   4B5    Test(s) Performed   Ki-67    Percentage of Cells with Nuclear Positivity   8 %   Cold Ischemia and Fixation Times   Meet requirements specified in latest version of the ASCO / CAP Guidelines    Cold Ischemia Time (minutes)   60 min   Fixation Time (hours)   10 hours   Testing Performed on Block Number(s)   6A    METHODS   Fixative   Formalin    Image Analysis   Not performed    Comment(s)   This is the smaller tumor               Labs:   [facility] genetic panel March 28, 2022 breast and gynecology, pancreas and liver.  Returned as no mutations.  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  I will let her know this morning before surgery.    Procedures:      Assessment:   Diagnosis Plan   1. Malignant neoplasm of upper-outer quadrant of left breast in female, estrogen receptor positive (HCC)  Mammo Screening Modified With Tomosynthesis Right With CAD   2. Abnormal MRI, breast     3. Morbid (severe) obesity due to excess calories (HCC)     4. FH: breast cancer     5. FH: ovarian cancer     6. FH: pancreatic cancer     1-3  Left breast mass 1:00, 6 cm from the nipple, 6 x 8 cm on examination, MRI markers are 6 cm apart but then anterior to the 2 below  markers there is a 5 x 4.3 cm area of enhancement.  1.  Left breast 2:00, 9 cm from  the nipple, 5 mm focus, Venus marker, invasive lobular carcinoma, low-grade, 3, 1, 1, 5 mm in a core, no lymphovascular invasion, no duct carcinoma in situ, estrogen 100, progesterone 100, HER-2/lelia 1+, Ki-67 12%.      Site 2.  Left breast 2:00, 15 cm in the nipple, 6 mm on imaging, hydro-Thomas marker left in good position, invasive lobular carcinoma, intermediate grade, 3, 2, 1, 4 mm in greatest dimension, focal atypical lobular hyperplasia, no lymphovascular invasion, estrogen 100, progesterone 100, HER-2/lelia 1+, Ki-67 13%.      Clinical stage T3N0 stage IIb nodes negative on examination and MRI.      Pathology from left total mastectomy and sentinel lymph node biopsy dated March 30, 2022: In the left mastectomy 2 foci of invasive lobular carcinoma, lobular carcinoma in situ and atypical lobular hyperplasia.  The larger focus is 6 cm.  Invasive lobular carcinoma, low-grade, 3, 1, 1, margins clear.  Smaller focus is 3.5 mm, invasive lobular, low-grade, 3, 1, 1, comes to within 1 mm of the anterior skin surface that was removed.  Therefore this was not a true margin.  All other margins are widely clear.  0 of 7 sentinel nodes.  Pathologic stage MPT3N0 stage IIb.    3-  BMI 47      4-  Daughter- patient of Dr Serrano- DCIS- age 47  Mrs Michaels [facility] genetic panel March 28, 2022 breast and gynecology, pancreas and liver.  Returned as no mutations.  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     5-  Mother age 67    6--  MGM age 71        Plan:  The patient goes by Mary Ann.  She is friends with several of my old patients.  Her daughter is a patient of Dr. Smiley.  She is here for her postoperative visit today.  She is healing nicely.  She is scheduled to see medical oncology on Thursday.  I did ask her to call to see if the Oncotype is back by tomorrow and if not she may want to delay that appointment till this returns.  She has an appoint with radiation oncology on the  27th.  Her [facility] right [facility]  I will arrange that for her and we will see her back after.  I did asked her to let me know if she needs a port placement.  We did discuss right port placement including risk of bleeding, infection, thrombus, malfunction, pneumothorax.  We had already arranged for physical therapy bioimpedance measurement she just has to make this appointment now that her drains are out.  I gave her this reminder today.  Asked her to continue her self breast exam and to call us in the future for any concerns and we would be happy to see her back sooner otherwise we will see her back in December after imaging        Catherine Isaac MD          Next Appointment:  Return for Next scheduled follow up, after imaging.      EMR Dragon/transcription disclaimer:    Much of this encounter note is an electronic transcription/translocation of spoken language to printed text.  The electronic translation of spoken language may permit erroneous, or at times, nonsensical words or phrases to be inadvertently transcribed.  Although I have reviewed the note from such areas, some may still exist.                Answers for HPI/ROS submitted by the patient on 4/15/2022  Please describe your symptoms.: post op mastectomy  Have you had these symptoms before?: No  How long have you been having these symptoms?: 1-2 weeks  Please list any medications you are currently taking for this condition.: gabopentin q 8hrs, tylenol prn  Please describe any probable cause for these symptoms. : post op pain  What is the primary reason for your visit?: Other

## 2022-04-19 NOTE — TELEPHONE ENCOUNTER
Pt notified of the following appts:  Screening MMG scheduled at [facility] on 12/12/2022 at 10:15  Follow up with Dr Isaac is scheduled on 12/21/2022 at 8:30.  Pt verbalized understanding.     CMA

## 2022-04-21 ENCOUNTER — APPOINTMENT (OUTPATIENT)
Dept: OTHER | Facility: HOSPITAL | Age: 67
End: 2022-04-21

## 2022-04-22 ENCOUNTER — TELEPHONE (OUTPATIENT)
Dept: SURGERY | Facility: CLINIC | Age: 67
End: 2022-04-22

## 2022-04-22 ENCOUNTER — LAB (OUTPATIENT)
Dept: LAB | Facility: HOSPITAL | Age: 67
End: 2022-04-22

## 2022-04-22 ENCOUNTER — TELEMEDICINE (OUTPATIENT)
Dept: ONCOLOGY | Facility: CLINIC | Age: 67
End: 2022-04-22

## 2022-04-22 VITALS
WEIGHT: 278.5 LBS | HEART RATE: 73 BPM | SYSTOLIC BLOOD PRESSURE: 147 MMHG | OXYGEN SATURATION: 99 % | HEIGHT: 65 IN | RESPIRATION RATE: 18 BRPM | TEMPERATURE: 97.1 F | DIASTOLIC BLOOD PRESSURE: 71 MMHG | BODY MASS INDEX: 46.4 KG/M2

## 2022-04-22 DIAGNOSIS — C50.412 MALIGNANT NEOPLASM OF UPPER-OUTER QUADRANT OF LEFT BREAST IN FEMALE, ESTROGEN RECEPTOR POSITIVE: ICD-10-CM

## 2022-04-22 DIAGNOSIS — Z17.0 MALIGNANT NEOPLASM OF UPPER-OUTER QUADRANT OF LEFT BREAST IN FEMALE, ESTROGEN RECEPTOR POSITIVE: ICD-10-CM

## 2022-04-22 DIAGNOSIS — Z17.0 MALIGNANT NEOPLASM OF UPPER-OUTER QUADRANT OF LEFT BREAST IN FEMALE, ESTROGEN RECEPTOR POSITIVE: Primary | ICD-10-CM

## 2022-04-22 DIAGNOSIS — C50.412 MALIGNANT NEOPLASM OF UPPER-OUTER QUADRANT OF LEFT BREAST IN FEMALE, ESTROGEN RECEPTOR POSITIVE: Primary | ICD-10-CM

## 2022-04-22 LAB
ALBUMIN SERPL-MCNC: 3.8 G/DL (ref 3.5–5.2)
ALBUMIN/GLOB SERPL: 1.2 G/DL (ref 1.1–2.4)
ALP SERPL-CCNC: 76 U/L (ref 38–116)
ALT SERPL W P-5'-P-CCNC: 24 U/L (ref 0–33)
ANION GAP SERPL CALCULATED.3IONS-SCNC: 8.3 MMOL/L (ref 5–15)
AST SERPL-CCNC: 20 U/L (ref 0–32)
BASOPHILS # BLD AUTO: 0.06 10*3/MM3 (ref 0–0.2)
BASOPHILS NFR BLD AUTO: 0.8 % (ref 0–1.5)
BILIRUB SERPL-MCNC: 0.5 MG/DL (ref 0.2–1.2)
BUN SERPL-MCNC: 24 MG/DL (ref 6–20)
BUN/CREAT SERPL: 23.3 (ref 7.3–30)
CALCIUM SPEC-SCNC: 9.7 MG/DL (ref 8.5–10.2)
CHLORIDE SERPL-SCNC: 104 MMOL/L (ref 98–107)
CO2 SERPL-SCNC: 28.7 MMOL/L (ref 22–29)
CREAT SERPL-MCNC: 1.03 MG/DL (ref 0.6–1.1)
DEPRECATED RDW RBC AUTO: 46.4 FL (ref 37–54)
EGFRCR SERPLBLD CKD-EPI 2021: 59.7 ML/MIN/1.73
EOSINOPHIL # BLD AUTO: 0.45 10*3/MM3 (ref 0–0.4)
EOSINOPHIL NFR BLD AUTO: 6.1 % (ref 0.3–6.2)
ERYTHROCYTE [DISTWIDTH] IN BLOOD BY AUTOMATED COUNT: 13.7 % (ref 12.3–15.4)
GLOBULIN UR ELPH-MCNC: 3.1 GM/DL (ref 1.8–3.5)
GLUCOSE SERPL-MCNC: 120 MG/DL (ref 74–124)
HCT VFR BLD AUTO: 42.7 % (ref 34–46.6)
HGB BLD-MCNC: 14.2 G/DL (ref 12–15.9)
IMM GRANULOCYTES # BLD AUTO: 0.03 10*3/MM3 (ref 0–0.05)
IMM GRANULOCYTES NFR BLD AUTO: 0.4 % (ref 0–0.5)
LYMPHOCYTES # BLD AUTO: 1.63 10*3/MM3 (ref 0.7–3.1)
LYMPHOCYTES NFR BLD AUTO: 22.2 % (ref 19.6–45.3)
MCH RBC QN AUTO: 30.6 PG (ref 26.6–33)
MCHC RBC AUTO-ENTMCNC: 33.3 G/DL (ref 31.5–35.7)
MCV RBC AUTO: 92 FL (ref 79–97)
MONOCYTES # BLD AUTO: 0.67 10*3/MM3 (ref 0.1–0.9)
MONOCYTES NFR BLD AUTO: 9.1 % (ref 5–12)
NEUTROPHILS NFR BLD AUTO: 4.5 10*3/MM3 (ref 1.7–7)
NEUTROPHILS NFR BLD AUTO: 61.4 % (ref 42.7–76)
NRBC BLD AUTO-RTO: 0 /100 WBC (ref 0–0.2)
PLATELET # BLD AUTO: 185 10*3/MM3 (ref 140–450)
PMV BLD AUTO: 11.2 FL (ref 6–12)
POTASSIUM SERPL-SCNC: 3.9 MMOL/L (ref 3.5–4.7)
PROT SERPL-MCNC: 6.9 G/DL (ref 6.3–8)
RBC # BLD AUTO: 4.64 10*6/MM3 (ref 3.77–5.28)
SODIUM SERPL-SCNC: 141 MMOL/L (ref 134–145)
WBC NRBC COR # BLD: 7.34 10*3/MM3 (ref 3.4–10.8)

## 2022-04-22 PROCEDURE — 99205 OFFICE O/P NEW HI 60 MIN: CPT | Performed by: INTERNAL MEDICINE

## 2022-04-22 PROCEDURE — 85025 COMPLETE CBC W/AUTO DIFF WBC: CPT

## 2022-04-22 PROCEDURE — 80053 COMPREHEN METABOLIC PANEL: CPT

## 2022-04-22 PROCEDURE — 36415 COLL VENOUS BLD VENIPUNCTURE: CPT

## 2022-04-22 NOTE — TELEPHONE ENCOUNTER
Spoke with Kvng at [facility] regarding Oncotype.   Case is open, statement of medical necessity was received and initiated by insurance co

## 2022-04-22 NOTE — PROGRESS NOTES
Subjective   Mary Ann Michaels is a 66 y.o. female.  Referred by Dr. Isaac for left breast invasive lobular carcinoma    History of Present Illness    is a 66-year-old postmenopausal  lady with hypertension presented with a left breast screening abnormality.  Family history is significant for her daughter with DCIS and currently on tamoxifen at the age of 49, mother with history of ovarian cancer at the age of 67, maternal grandmother with history of pancreatic cancer in her 70s, 2 brothers with liver cancer.    12/7/2021-bilateral screening mammogram-new left breast focal asymmetry.  Diagnostic mammogram recommended.  Left breast ultrasound recommended.  Right breast is benign.    12/17/2021-left breast diagnostic mammogram and ultrasound  6 mm indistinct mass in the outer slightly upper left breast, posterior depth.  Ultrasound shows a 5 x 4 x 5 mm indistinct hypoechoic mass at 2:00, 9 cm from the nipple.  Ultrasound-guided biopsy of the left breast mass recommended.    1/12/2022-ultrasound-guided biopsy  1.specimen 1-left breast 2:00, 15 cm from the nipple-invasive lobular carcinoma, largest tumor focus 5 mm  Negative for carcinoma in situ or lymphovascular space invasion.  ER positive strong 100%  ME positive strong 100%  HER2 negative    Specimen to-left breast 2:00, 9 cm from the nipple  Invasive lobular carcinoma, focal atypical lobular hyperplasia  Negative for carcinoma in situ or lymphovascular space invasion.    1/30/2022-bilateral breast MRI  V shaped metallic clip is seen at the 2 o'clock position in the upper outer quadrant of the left breast which does not show any surrounding enhancement.  But this is the biopsy-proven site of malignancy.  This is posterior to a 5.2 cm clumped enhancement.  This is suspicious for additional disease.  No evidence of left axillary lymphadenopathy.  There is a more posterior located culture metallic clip in the posterior one third at 2 o'clock position  which is immediately adjacent to a 0.9 cm ill-defined enhancing lesion.  This represents biopsy-proven site of malignancy.  No other suspicious findings in the right breast.    She was seen by Dr. Isaac who performed a left mastectomy and sentinel lymph node biopsy on 3/30/2022  Pathology was consistent with invasive lobular carcinoma, 2 foci.  Larger focus measures 6 cm and the smaller focus measures 3.5 mm and noted in the nipple.  No skin ulceration.  7 sentinel lymph nodes were evaluated and negative for malignancy.  Both the tumors were strongly ER/MD positive at 99%, HER2 negative and Ki-67 was 12 to 13% on the larger tumor and 8% on the smaller tumor.    She is undergoing reconstruction by Dr. Montgomery.  She presents today accompanied by her friend Natalya to discuss adjuvant therapy.    She is recovering well from surgery.     Reports some recent weight gain, no new bone pains, abdominal pain nausea vomiting cough or dyspnea.      The following portions of the patient's history were reviewed and updated as appropriate: allergies, current medications, past family history, past medical history, past social history, past surgical history and problem list.    Past Medical History:   Diagnosis Date   • Anxiety and depression    • Female stress incontinence    • Hiatal hernia    • History of left breast cancer    • Hypertension    • Insomnia    • Sleep apnea     CPAP        Past Surgical History:   Procedure Laterality Date   • BREAST RECONSTRUCTION Left 3/30/2022    Procedure: LEFT PLACEMENT TISSUE EXPANDER AND ALLODERM;  Surgeon: Raj Montgomery MD;  Location: [facility];  Service: Plastics;  Laterality: Left;   •  SECTION     • CHOLECYSTECTOMY     • HYSTERECTOMY     • KNEE SURGERY Right     ARTHROSCOPY TENDON REPAIR   • MASTECTOMY W/ SENTINEL NODE BIOPSY Left 3/30/2022    Procedure: left total mastectomy with left axillary sentinel lymph node biopsy;  Surgeon: Catherine Isaac MD;   "Location: [facility] OR;  Service: General;  Laterality: Left;        Family History   Problem Relation Age of Onset   • Ovarian cancer Mother 67   • Lung cancer Father    • Liver cancer Brother    • Liver cancer Brother    • Pancreatic cancer Maternal Grandmother    • Malig Hyperthermia Neg Hx         Social History     Socioeconomic History   • Marital status:    Tobacco Use   • Smoking status: Never Smoker   • Smokeless tobacco: Never Used   Vaping Use   • Vaping Use: Never used   Substance and Sexual Activity   • Alcohol use: Never   • Drug use: Never   • Sexual activity: Defer        OB History    No obstetric history on file.     Age at menarche-11  Age at first live childbirth-18  She had a hysterectomy and a bilateral salpingo-oophorectomy at the age of 39  No use of hormone replacement therapy  Oral contraceptive pill use for 2 to 3 years   3 para 2  1    No Known Allergies         Review of Systems   Constitutional: Positive for unexpected weight gain.   HENT: Negative.    Eyes: Negative.    Respiratory: Negative.    Cardiovascular: Negative.    Gastrointestinal: Negative.    Endocrine: Negative.    Genitourinary: Negative.    Musculoskeletal: Negative.    Allergic/Immunologic: Negative.    Hematological: Negative.    Psychiatric/Behavioral: Negative.          Objective   Blood pressure 147/71, pulse 73, temperature 97.1 °F (36.2 °C), temperature source Temporal, resp. rate 18, height 163.8 cm (64.5\"), weight 126 kg (278 lb 8 oz), SpO2 99 %, not currently breastfeeding.   Physical Exam  Vitals reviewed.   Constitutional:       Appearance: Normal appearance. She is obese.   HENT:      Head: Normocephalic.      Nose: Nose normal.      Mouth/Throat:      Mouth: Mucous membranes are moist.   Eyes:      Conjunctiva/sclera: Conjunctivae normal.      Pupils: Pupils are equal, round, and reactive to light.   Cardiovascular:      Rate and Rhythm: Normal rate.   Pulmonary:      Effort: " Pulmonary effort is normal.   Abdominal:      General: Abdomen is flat.   Musculoskeletal:         General: Normal range of motion.      Cervical back: Normal range of motion.   Skin:     General: Skin is warm.   Neurological:      General: No focal deficit present.      Mental Status: She is alert and oriented to person, place, and time. Mental status is at baseline.   Psychiatric:         Mood and Affect: Mood normal.         Behavior: Behavior normal.         Thought Content: Thought content normal.         Judgment: Judgment normal.       Breast Exam: Left breast status post mastectomy with expander in place.  Incision healing well.  Right breast not examined today.    Lab on 04/22/2022   Component Date Value Ref Range Status   • WBC 04/22/2022 7.34  3.40 - 10.80 10*3/mm3 Final   • RBC 04/22/2022 4.64  3.77 - 5.28 10*6/mm3 Final   • Hemoglobin 04/22/2022 14.2  12.0 - 15.9 g/dL Final   • Hematocrit 04/22/2022 42.7  34.0 - 46.6 % Final   • MCV 04/22/2022 92.0  79.0 - 97.0 fL Final   • MCH 04/22/2022 30.6  26.6 - 33.0 pg Final   • MCHC 04/22/2022 33.3  31.5 - 35.7 g/dL Final   • RDW 04/22/2022 13.7  12.3 - 15.4 % Final   • RDW-SD 04/22/2022 46.4  37.0 - 54.0 fl Final   • MPV 04/22/2022 11.2  6.0 - 12.0 fL Final   • Platelets 04/22/2022 185  140 - 450 10*3/mm3 Final   • Neutrophil % 04/22/2022 61.4  42.7 - 76.0 % Final   • Lymphocyte % 04/22/2022 22.2  19.6 - 45.3 % Final   • Monocyte % 04/22/2022 9.1  5.0 - 12.0 % Final   • Eosinophil % 04/22/2022 6.1  0.3 - 6.2 % Final   • Basophil % 04/22/2022 0.8  0.0 - 1.5 % Final   • Immature Grans % 04/22/2022 0.4  0.0 - 0.5 % Final   • Neutrophils, Absolute 04/22/2022 4.50  1.70 - 7.00 10*3/mm3 Final   • Lymphocytes, Absolute 04/22/2022 1.63  0.70 - 3.10 10*3/mm3 Final   • Monocytes, Absolute 04/22/2022 0.67  0.10 - 0.90 10*3/mm3 Final   • Eosinophils, Absolute 04/22/2022 0.45 (A) 0.00 - 0.40 10*3/mm3 Final   • Basophils, Absolute 04/22/2022 0.06  0.00 - 0.20 10*3/mm3 Final    • Immature Grans, Absolute 04/22/2022 0.03  0.00 - 0.05 10*3/mm3 Final   • nRBC 04/22/2022 0.0  0.0 - 0.2 /100 WBC Final   Admission on 03/30/2022, Discharged on 03/31/2022   Component Date Value Ref Range Status   • Addendum 03/30/2022    Final                    Value:This result contains rich text formatting which cannot be displayed here.   • Case Report 03/30/2022    Final                    Value:Surgical Pathology Report                         Case: [id number]                                  Authorizing Provider:  Catherine Isaac MD    Collected:           03/30/2022 09:10 AM          Ordering Location:     [facility]  Received:            03/30/2022 09:27 AM                                 MAIN OR                                                                      Pathologist:           Kyle Samuels MD                                                         Specimens:   1) - Rowe Lymph Node, Left sentinel node # 1                                                    2) - Rowe Lymph Node, Left sentinel node # 2                                                    3) - Rowe Lymph Node, Left sentinel node # 3                                                    4) - Rowe Lymph Node, left sentinel node # 4                                                    5) - [place]e Lymph Node, left sentinel node # 5                                                                              6) - Breast, Left, Left breast total mastectomy- stitch marks 12 o'clock                  • Clinical Information 03/30/2022    Final                    Value:This result contains rich text formatting which cannot be displayed here.   • Final Diagnosis 03/30/2022    Final                    Value:This result contains rich text formatting which cannot be displayed here.   • Synoptic Checklist 03/30/2022    Final                    Value:INVASIVE CARCINOMA OF THE BREAST: Resection                             INVASIVE CARCINOMA OF THE BREAST: COMPLETE EXCISION - All Specimens                            8th Edition - Protocol posted: 12/17/2021                                                        SPECIMEN                               Procedure:    Total mastectomy                                Specimen Laterality:    Left                                                         TUMOR                               Tumor Site:    Upper outer quadrant                                Histologic Type:    Invasive lobular carcinoma                                Histologic Grade (Paoli Histologic Score):                                     Glandular (Acinar) / Tubular Differentiation:    Score 3                                  Nuclear Pleomorphism:    Score 1                                  Mitotic Rate:    Score 1                                  Overall Grade:    Grade 1 (scores of 3, 4 or 5)                                Tumor Size:    Greatest dimension of largest invasive focus (Millimeters): 60 mm                               Tumor Focality:    Multiple foci of invasive carcinoma                                  Number of Foci:    2                                  Sizes of Individual Foci (Millimeters):    60 mm and 3.5 mm mm                             Ductal Carcinoma In Situ (DCIS):    Not identified                              Lobular Carcinoma In Situ (LCIS):    Present                                Tumor Extent:    Skin is present and involved                                  Skin Invasion:    Invasive carcinoma directly invades into the dermis or epidermis without skin ulceration (this does not change the T classification)                                  Skin Satellite Foci:    Satellite foci not identified                              Lymphovascular Invasion:    Not identified                              Dermal Lymphovascular Invasion:    Not identified                               Microcalcifications:    Present in invasive carcinoma                              Treatment Effect in the Breast:    No known presurgical therapy                                                         MARGINS                             Margin Status for Invasive Carcinoma:    All margins negative for invasive carcinoma                                Distance from Invasive Carcinoma to Closest Margin:    24 mm                               Closest Margin(s) to Invasive Carcinoma:    Posterior                                Distance from Invasive Carcinoma to Anterior Margin:    skin comes within 1mm of surface                                Distance from Invasive Carcinoma to Posterior Margin:    24 mm                               Distance from Invasive Carcinoma to Superior Margin:    90 mm                               Distance from Invasive Carcinoma to Inferior Margin:    60 mm                               Distance from Invasive Carcinoma to Medial Margin:    90 mm                               Distance from Invasive Carcinoma to Lateral Margin:    60 mm                                                        REGIONAL LYMPH NODES                             Regional Lymph Node Status:                                   :    All regional lymph nodes negative for tumor                                Total Number of Lymph Nodes Examined (sentinel and non-sentinel):    7                                Number of [place] Nodes Examined:    7                                                         DISTANT METASTASIS                                                        PATHOLOGIC STAGE CLASSIFICATION (pTNM, AJCC 8th Edition)                               Reporting of pT, pN, and (when applicable) pM categories is based on information available to the pathologist at the time the report is issued. As per the AJCC (Chapter 1, 8th Ed.) it is the managing physician’s responsibility to establish the final pathologic  stage based upon all pertinent information, including but potentially not limited to this pathology report.                             TNM Descriptors:    m (multiple foci of invasive carcinoma)                              pT Category:    pT3                              pN Category:    pN0                                                         SPECIAL STUDIES                               Estrogen Receptor (ER) Status:    Positive (greater than 10% of cells demonstrate nuclear positivity)                                  Percentage of Cells with Nuclear Positivity:    99 %                               Progesterone Receptor (PgR) Status:    Positive                                  Percentage of Cells with Nuclear Positivity:    99 %                               HER2 (by immunohistochemistry):    Negative (Score 1+)                                Ki-67 Percentage of Positive Nuclei:    13 %                               Testing Performed on Case Number:                                 Breast Biomarker Reporting Template                            BREAST: BIOMARKER REPORTING TEMPLATE - All Specimens                            Protocol posted: 11/10/2021                                                           Test(s) Performed:                                     Estrogen Receptor (ER) Status:    Positive (greater than 10% of cells demonstrate nuclear positivity)                                    Percentage of Cells with Nuclear Positivity:    99 %                                   Average Intensity of Staining:    Strong                                  Test Type:    Food and Drug Administration (FDA) cleared (test / vendor): [employer]                                  Primary Antibody:    SP1                                  Scoring System:    Lauren                                    Proportion Score:    5                                    Intensity Score:    3                                    Total  Lauren Score:    8                                Test(s) Performed:                                     Progesterone Receptor (PgR) Status:    Positive                                    Percentage of Cells with Nuclear Positivity:    99 %                                   Average Intensity of Staining:    Strong                                  Test Type:    Food and Drug Administration (FDA) cleared (test / vendor): [employer]                                  Primary Antibody:    1E2                                  Scoring System:    Lauren                                    Proportion Score:    5                                    Intensity Score:    3                                    Total Lauren Score:    8                                Test(s) Performed:                                     HER2 by Immunohistochemistry:    Negative (Score 1+)                                  Test Type:    Food and Drug Administration (FDA) cleared (test / vendor): [employer]                                  Primary Antibody:    4B5                                Test(s) Performed:    Ki-67                                  Percentage of Cells with Nuclear Positivity:    8 %                               Cold Ischemia and Fixation Times:    Meet requirements specified in latest version of the ASCO / CAP Guidelines                                Cold Ischemia Time (minutes):    60 min                               Fixation Time (hours):    10 hours                               Testing Performed on Block Number(s):    6A                                                         METHODS                               Fixative:    Formalin                                Image Analysis:    Not performed                                                            Comment(s):    This is the smaller tumor      • Intraoperative Consultation 03/30/2022    Final                    Value:This result contains rich text formatting which cannot be  displayed here.   • Gross Description 03/30/2022    Final                    Value:This result contains rich text formatting which cannot be displayed here.   • Special Stains 03/30/2022    Final                    Value:This result contains rich text formatting which cannot be displayed here.   Lab on 03/29/2022   Component Date Value Ref Range Status   • COVID19 03/29/2022 Not Detected  Not Detected - Ref. Range Final        XR Chest 2 View    Result Date: 3/23/2022  Negative PA and lateral chest radiographs.  This report was finalized on 3/23/2022 11:48 AM by Dr. Sergo Thomas M.D.           Assessment/Plan       *Left breast invasive lobular carcinoma  · pT3 multifocal N0 M0  · Clinical prognostic stage II, pathological prognostic stage I  Discussed at length the details of imaging and pathology report.Discussed the origin of breast cancer from the ducts and the lobules and the histological type of breast cancer based on site of origin. Discussed the tumor size, lymph node status and stage of the cancer. Explained the presence of DCIS. Discussed the receptor status including ER, VA and her-2 lelia and their significance in determining the biology and treatment. Also discussed the importance of grade and ki-67.   2 foci of invasive lobular carcinoma noted 1 was 60 mm and the second was 3.5 mm.  · Margins negative  · Both the tumors were strongly ER/VA positive HER2 negative with a low Ki-67, grade 1  · Oncotype DX recurrence score has been performed but results pending  · We discussed the significance of Oncotype DX and the fact that we would have to await the results prior to deciding on the need for chemotherapy.  · Given that the tumor is ER/VA positive she would benefit from adjuvant endocrine therapy to decrease the risk of recurrence.  · Since she is postmenopausal we discussed the role of aromatase inhibitors.  · I will plan to set up a video visit to review the Oncotype DX results as well as further  recommendations.    *Bone health  · Patient had a recent DEXA scan the results of which are not available to us.  · Will obtain the DEXA to review the results  · According to her she has osteopenia.    *Hypertension  · Blood pressure well controlled  · Continue current medication    *Family history of breast cancer  · Genetic testing performed and negative for any pathogenic mutation.    *Follow-up-1 to 2 weeks to discuss treatment recommendations    60 minutes spent on the encounter including face-to-face time, reviewing the medical records and documentation on the same day

## 2022-04-26 ENCOUNTER — NURSE NAVIGATOR (OUTPATIENT)
Dept: OTHER | Facility: HOSPITAL | Age: 67
End: 2022-04-26

## 2022-04-26 NOTE — PROGRESS NOTES
Called Ms. Michaels to see how she was doing. She stated she is recovering well and is thankful chemo is not needed. She has an appt tomorrow with radiation oncology to discuss radiation. She was thankful for the call and will reach out if any questions or needs arise.

## 2022-04-27 ENCOUNTER — APPOINTMENT (OUTPATIENT)
Dept: RADIATION ONCOLOGY | Facility: HOSPITAL | Age: 67
End: 2022-04-27

## 2022-04-27 ENCOUNTER — CONSULT (OUTPATIENT)
Dept: RADIATION ONCOLOGY | Facility: HOSPITAL | Age: 67
End: 2022-04-27

## 2022-04-27 ENCOUNTER — CLINICAL SUPPORT (OUTPATIENT)
Dept: OTHER | Facility: HOSPITAL | Age: 67
End: 2022-04-27

## 2022-04-27 VITALS — WEIGHT: 278 LBS | BODY MASS INDEX: 47.46 KG/M2 | HEIGHT: 64 IN

## 2022-04-27 VITALS — HEART RATE: 55 BPM | OXYGEN SATURATION: 94 % | DIASTOLIC BLOOD PRESSURE: 87 MMHG | SYSTOLIC BLOOD PRESSURE: 135 MMHG

## 2022-04-27 DIAGNOSIS — C50.412 MALIGNANT NEOPLASM OF UPPER-OUTER QUADRANT OF LEFT BREAST IN FEMALE, ESTROGEN RECEPTOR POSITIVE: Primary | ICD-10-CM

## 2022-04-27 DIAGNOSIS — Z17.0 MALIGNANT NEOPLASM OF UPPER-OUTER QUADRANT OF LEFT BREAST IN FEMALE, ESTROGEN RECEPTOR POSITIVE: Primary | ICD-10-CM

## 2022-04-27 PROCEDURE — 99204 OFFICE O/P NEW MOD 45 MIN: CPT | Performed by: RADIOLOGY

## 2022-04-27 NOTE — PROGRESS NOTES
"Outpatient Oncology Nutrition  Assessment/PES    Patient Name:  Mary Ann Michaels  YOB: 1955  MRN: [id number]    Assessment Date:  4/27/2022    Comments:  Met with patient in the cancer resource center today to discuss breast cancer nutrition and weight management.   Reviewed AICR evidence based recommendations with emphasis on a plant focused diet. Provided patient with meal plans, grocery list, meal planning tools, meal ideas. Discussed increasing daily activity as she is mostly sedentary. Also advised patient to work on increasing her water intake.   Discussed portion control, and meal prep as well as tracking intake for accountablity.   Will be available for any questions.       General Info     Row Name 04/27/22 1545       Today's Session    Person(s) attending today's session Patient       General Information    Oncology patient? yes  L breast invasive lobular carcinoma, s/p L mastectomy 3/30/22       Oncology Specific Assessment    Type of treatment Radiation    Frequency of treatment met with rad onc                 Anthropometrics     Row Name 04/27/22 1547 04/27/22 1546       Anthropometrics    Height -- 163.8 cm (64.49\")    Weight -- 126 kg (278 lb)    Weight for Calculation 126 kg (278 lb) --                 Home Nutrition Report     Row Name 04/27/22 1546          Home Nutrition Report    Typical Intake (Food/Fluid/EN/PN) works from home, eats meals at home                Estimated/Assessed Needs - Anthropometrics     Row Name 04/27/22 1547 04/27/22 1546       Anthropometrics    Height -- 163.8 cm (64.49\")    Weight -- 126 kg (278 lb)    Weight for Calculation 126 kg (278 lb) --       Estimated/Assessed Needs    Additional Documentation KCAL/KG (Group);Protein Requirements (Group) --       KCAL/KG    KCAL/KG 15 Kcal/Kg (kcal) --    15 Kcal/Kg (kcal) 1891.5 --       Protein Requirements    Weight Used For Protein Calculations 126 kg (278 lb) --    Est Protein Requirement Amount (gms/kg) 0.8 " gm protein --    Estimated Protein Requirements (gms/day) 100.88 --                 Labs/Tests/Procedures/Meds     Row Name 04/27/22 1547          Labs/Procedures/Meds    Lab Results Reviewed reviewed            Diagnostic Tests/Procedures    Diagnostic Test/Procedure Reviewed reviewed            Medications    Pertinent Medications Reviewed reviewed     Pertinent Medications Comments celexa, norco, zofran, pericolace, ambien                   Problem/Interventions:   Problem 1     Row Name 04/27/22 1547          Nutrition Diagnoses Problem 1    Problem 1 Knowledge Deficit     Etiology (related to) Medical Diagnosis     Oncology Breast cancer     Signs/Symptoms (evidenced by) Demonstrated Information Deficit                        Intervention Goal     Row Name 04/27/22 1548          Intervention Goal    General Provide information regarding MNT for treatment/condition     Weight Appropriate weight loss                    Education/Evaluation     Row Name 04/27/22 1548          Education    Education Provided education regarding;Education topics;Advised regarding habits/behavior     Provided education regarding Preventative health;Key food habit change     Advised Regarding Habits/Behavior Activity;Food prep;Appropriate beverage;Self monitor;Appropriate portions;Food shopping;Snacks;Eating out;Eating pattern;Label reading;Food choices;Meal planning            Monitor/Evaluation    Education Follow-up Reinforce PRN                 Electronically signed by:  Luisana Toney RD, YODIT  04/27/22 15:48 EDT

## 2022-04-27 NOTE — PROGRESS NOTES
Subjective     Catherine Isaac MD    Cancer Staging  No matching staging information was found for the patient.    CC:mpT3N0 invasive lobular carcinoma of left breast, ER MN pos Her 2 neg                                Dear Catherine Isaac MD    I had the pleasure of seeing Mary Ann Michaels  [facility].   The patient is a 66 y.o. female with recently diagnosed left breast cancer.  She had a screening mammogram on 12/7/21 which showed a focal asymmetry in superior left breast birads 0.  She had a left diagnostic mammogram and ultrasound on 12/17/21 which showed a 6mm mass outer upper left breast which on ultrasound appeared as a hypoechoic mass 5mm in size at 2 oclock birads 4.  She had an us guided biopsy on 1/12/22 which confirmed lesion 1 invasive lobular carcinoma, grade 1, 5mm, % MN 1005 her 2 negative and specimen 2 revealed invasive lobular carcinoma, grade 2, 4mm, ER 99% MN 99% Her 2 negative ki 67 13%.     She had a breast mri on 1/31/22 which showed clip in the posterior one third of the upper-outer quadrant of the left breast at the 2 o'clock position that is not surrounded by any abnormal enhancement but represents a  biopsy-proven site of malignancy. This metallic clip is on the order of 2.8 cm posterior to an area of clumped enhancement that measures up to 5.2 cm in greatest dimension and corresponds to an area of prominent  parenchyma that can be seen on the mammograms of 12/17/2021 and 01/12/2022. This is suspicious for additional disease. No evidence for left axillary adenopathy is appreciated. 2. There is a more posteriorly located coil-shaped metallic clip in the posterior one third at the 2 o'clock position that is immediately  adjacent to a 0.9 cm ill-defined enhancing lesion. This also represents a biopsy-proven site of malignancy.    She underwent a left total mastectomy and sentinel node biopsy on 3/30/22 with pathology revealing 2 foci of invasive lobular  carcinoma, largest focus 6cm low grade and smaller focus of 3.5mm low grade within 1mm of anterior skin surface that was removed, therefore was not consider a true margin. Invasive carcinoma directly invades into the dermis or epidermis without skin ulceration (this does not change the T classification)  0 of 7 sentinel lymph nodes were positive.  Her pathologic stage was mpT3N0/IIB.  She had tissue expanders placed at time of surgery by Dr. Montgomery.     She met with Dr. Dhillon with medical oncology on 22 and is awaiting oncotype results before deciding on chemotherapy.        She is  with menarche age 11, first childbirth age 18 and did not breast feed. Menopause age 39 at which time she underwent robby/bso.  She took birth control pills for 3 years and has never taken hormone replacement therapy. She has a family hx of ovarian cancer in her mother age 67, maternal grandmother with pancreatic cancer age 71, 2 brothers with liver cancer and a daughter with dcis age 49.  She had genetic testing which was negative.     She is recovering well and referred today for evaluation for adjuvant radiation.         Review of Systems   Constitutional: Negative.    Eyes: Positive for visual disturbance.   Cardiovascular: Negative.    Gastrointestinal: Negative.    Endocrine: Negative.    Musculoskeletal: Positive for arthralgias.   Neurological: Negative.    Psychiatric/Behavioral: Positive for dysphoric mood. The patient is nervous/anxious.          Past Medical History:   Diagnosis Date   • Anxiety and depression    • Female stress incontinence    • Hiatal hernia    • History of left breast cancer    • Hypertension    • Insomnia    • Sleep apnea     CPAP         Past Surgical History:   Procedure Laterality Date   • BREAST RECONSTRUCTION Left 3/30/2022    Procedure: LEFT PLACEMENT TISSUE EXPANDER AND ALLODERM;  Surgeon: Raj Montgomery MD;  Location: [facility] OR;  Service: Plastics;  Laterality: Left;   •   SECTION     • CHOLECYSTECTOMY     • HYSTERECTOMY     • KNEE SURGERY Right     ARTHROSCOPY TENDON REPAIR   • MASTECTOMY W/ SENTINEL NODE BIOPSY Left 3/30/2022    Procedure: left total mastectomy with left axillary sentinel lymph node biopsy;  Surgeon: Catherine Isaac MD;  Location: [facility] OR;  Service: General;  Laterality: Left;         Social History     Socioeconomic History   • Marital status:    Tobacco Use   • Smoking status: Never Smoker   • Smokeless tobacco: Never Used   Vaping Use   • Vaping Use: Never used   Substance and Sexual Activity   • Alcohol use: Never   • Drug use: Never   • Sexual activity: Defer         Family History   Problem Relation Age of Onset   • Ovarian cancer Mother 67   • Lung cancer Father    • Liver cancer Brother    • Liver cancer Brother    • Pancreatic cancer Maternal Grandmother    • Malig Hyperthermia Neg Hx           Objective    Physical Exam  Constitutional:       Appearance: Normal appearance.   HENT:      Head: Atraumatic.   Pulmonary:      Effort: Pulmonary effort is normal.   Chest:          Comments: Left breast surgically absent, expander in place, no suspicious lesions or nodules. No palpable axillary adenopathy.  Right breast ptosis, no palpable masses in right breast or axilla.   Musculoskeletal:         General: Normal range of motion.   Neurological:      General: No focal deficit present.      Mental Status: She is alert.   Psychiatric:         Mood and Affect: Mood normal.         Behavior: Behavior normal.           Current Outpatient Medications on File Prior to Visit   Medication Sig Dispense Refill   • acetaminophen (TYLENOL) 325 MG tablet Take 2 tablets by mouth Every 4 (Four) Hours As Needed for Mild Pain . 60 tablet 0   • citalopram (CeleXA) 40 MG tablet Take 1 tablet by mouth daily. (Patient taking differently: Take 40 mg by mouth Every Night.) 30 tablet 5   • docusate sodium (COLACE) 250 MG capsule Take 1 capsule by mouth 2  (Two) Times a Day As Needed for Constipation. 60 capsule 1   • gabapentin (Neurontin) 100 MG capsule Take 1 capsule by mouth Every 8 (Eight) Hours. 60 capsule 2   • HYDROcodone-acetaminophen (NORCO) 5-325 MG per tablet Take 1-2 tablets by mouth Every 4 (Four) Hours As Needed (Pain). 20 tablet 0   • ondansetron ODT (Zofran ODT) 8 MG disintegrating tablet Place 1 tablet on the tongue Every 8 (Eight) Hours As Needed for Nausea or Vomiting. 10 tablet 1   • propranolol LA (INDERAL LA) 160 MG 24 hr capsule Take 1 capsule by mouth Every Night. 90 capsule 3   • sennosides-docusate (PERICOLACE) 8.6-50 MG per tablet Take 2 tablets by mouth Daily As Needed for Constipation. 30 tablet 1   • spironolactone (ALDACTONE) 25 MG tablet Take 1 tablet by mouth daily. 90 tablet 1   • zolpidem (AMBIEN) 5 MG tablet Take 5 mg by mouth At Night As Needed.     • zolpidem (AMBIEN) 5 MG tablet Take 1 tablet by mouth nightly as needed for Sleep 30 tablet 2     No current facility-administered medications on file prior to visit.       ALLERGIES:  No Known Allergies    LMP  (LMP Unknown)      Current Status 4/22/2022   ECOG score 0         Assessment/Plan     66 year old female with mpT3N0 invasive lobular carcinoma of left breast s/p mastectomy with tissue expander placement. I discussed with her my recommendation for post-mastectomy radiation therapy to the left chest wall/reconstructed breast to decrease the risk of local recurrence given the tumor size of 6cm.      I discussed with her in detail the risks, benefits and rationale of radiation therapy to the left chest wall/expander to include but not limited to the following:    Acute: skin erythema, breakdown/moist desquamation, swelling or discomfort of the chest wall/tissue, fatigue, infection requiring removal of expander, pneumonitis resulting in shortness of breath, cough or pain    Late: Permanent skin changes including hyperpigmentation, telangiectasias, fibrosis of the skin/tissue  resulting in smaller size or poor cosmetic outcome, late edema or cellulitis, late rib fracture, late pulmonary fibrosis and the remote risk of late cardiac damage resulting in increased risk of heart attack or second malignancies.      She voiced understanding and was given an opportunity to ask questions which I believe were answered to her satisfaction.  We are awaiting oncotype results at this time. She lives in [place] and is interested in having her treatments there.  I will schedule her to see Dr. Ron Cook and discuss treatment there.  I gave her my card and asked to call with any questions or concerns in the future and I would be happy to speak with her.      I personally spent greater than 45 minutes today assessing, managing, discussing and documenting my visit with the patient. That time includes review of records, imaging and pathology reports, obtaining my own history, performing a medically appropriate evaluation, counseling and educating the patient, discussing goals, logistics, alternatives and risks of my recommendations, surveillance options and potential outcomes. It also includes the time documenting the clinical information in the EMR and communicating my recommendations to the other involved physicians.                 Thank you very much for allowing me to participate in the care of this very pleasant patient.    Sincerely,      Siobhan Styles MD

## 2022-04-29 ENCOUNTER — TELEPHONE (OUTPATIENT)
Dept: SURGERY | Facility: CLINIC | Age: 67
End: 2022-04-29

## 2022-05-02 LAB
BEAKER LAB AP INTRAOPERATIVE CONSULTATION: NORMAL
LAB AP CASE REPORT: NORMAL
LAB AP CLINICAL INFORMATION: NORMAL
LAB AP SPECIAL STAINS: NORMAL
LAB AP SYNOPTIC CHECKLIST: NORMAL
Lab: NORMAL
PATH REPORT.ADDENDUM SPEC: NORMAL
PATH REPORT.FINAL DX SPEC: NORMAL
PATH REPORT.GROSS SPEC: NORMAL

## 2022-05-11 ENCOUNTER — HOSPITAL ENCOUNTER (OUTPATIENT)
Dept: RADIATION ONCOLOGY | Facility: HOSPITAL | Age: 67
Setting detail: RADIATION/ONCOLOGY SERIES
End: 2022-05-11

## 2022-05-11 ENCOUNTER — TELEPHONE (OUTPATIENT)
Dept: ONCOLOGY | Facility: CLINIC | Age: 67
End: 2022-05-11

## 2022-05-11 ENCOUNTER — LAB REQUISITION (OUTPATIENT)
Dept: LAB | Facility: HOSPITAL | Age: 67
End: 2022-05-11

## 2022-05-11 DIAGNOSIS — Z00.00 ENCOUNTER FOR GENERAL ADULT MEDICAL EXAMINATION WITHOUT ABNORMAL FINDINGS: ICD-10-CM

## 2022-05-11 PROCEDURE — 87070 CULTURE OTHR SPECIMN AEROBIC: CPT | Performed by: PLASTIC SURGERY

## 2022-05-11 PROCEDURE — 87205 SMEAR GRAM STAIN: CPT | Performed by: PLASTIC SURGERY

## 2022-05-11 NOTE — TELEPHONE ENCOUNTER
----- Message from Alyssa Monge RN sent at 5/11/2022  4:20 PM EDT -----  Please add Ms Michaels on 5/27 at her 12:20 spot.  No labs needed.  She is aware.     Thank you!

## 2022-05-11 NOTE — TELEPHONE ENCOUNTER
I contacted Mary Ann by phone per Dr Dhillon's request as she cancelled her follow up with her on 5/2.  Ms Michaels reports she had another appt that day and really preferred an in-person visit.  I advised her that Dr Dhillon wanted to review her oncotype score with her.  I notified her that her score was 12 and she would not need chemotherapy per Dr Dhillon, but would schedule her an in-person visit soon so they can discuss further management.  She is scheduled to start radiation this Friday, however reports she saw Dr Mejias today and he started her on an antibiotic for a possible infection of her tissue expander.  I advised her to contact radiation to alert them of this as this may delay starting radiation treatment.  She was thankful for the call.  Message sent to  to make appt.

## 2022-05-12 ENCOUNTER — TELEPHONE (OUTPATIENT)
Dept: RADIATION ONCOLOGY | Facility: HOSPITAL | Age: 67
End: 2022-05-12

## 2022-05-12 PROCEDURE — 77263 THER RADIOLOGY TX PLNG CPLX: CPT | Performed by: RADIOLOGY

## 2022-05-12 NOTE — PROGRESS NOTES
RADIATION ONCOLOGY RE-EVALUATION NOTE    NAME: Mary Ann Michaels  YOB: 1955  MRN #: [id number]  DATE OF SERVICE: 5/13/2022  REFERRING PROVIDER: Manisha Isaac MD  PRIMARY CARE PROVIDER: Tasia Siu APRN    DIAGNOSIS:  mpT3N0 invasive lobular carcinoma of left breast, ER+/IA+/HER2-  1. Malignant neoplasm of upper-outer quadrant of left breast in female, estrogen receptor positive (HCC)      REASON FOR VISIT:  Re-evaluation for radiation therapy    HISTORY OF PRESENT ILLNESS: The patient is a 66 y.o. year old female with recently diagnosed left breast cancer.  She had a screening mammogram on 12/7/21 which showed a focal asymmetry in superior left breast birads 0.  She had a left diagnostic mammogram and ultrasound on 12/17/21 which showed a 6mm mass outer upper left breast which on ultrasound appeared as a hypoechoic mass 5mm in size at 2 oclock birads 4.  She had an us guided biopsy on 1/12/22 which confirmed lesion 1 invasive lobular carcinoma, grade 1, 5mm, % IA 1005 her 2 negative and specimen 2 revealed invasive lobular carcinoma, grade 2, 4mm, ER 99% IA 99% Her 2 negative ki 67 13%.      She had a breast mri on 1/31/22 which showed clip in the posterior one third of the upper-outer quadrant of the left breast at the 2 o'clock position that is not surrounded by any abnormal enhancement but represents a biopsy-proven site of malignancy. This metallic clip is on the order of 2.8 cm posterior to an area of clumped enhancement that measures up to 5.2 cm in greatest dimension and corresponds to an area of prominent parenchyma that can be seen on the mammograms of 12/17/2021 and 01/12/2022. This is suspicious for additional disease. No evidence for left axillary adenopathy is appreciated. 2. There is a more posteriorly located coil-shaped metallic clip in the posterior one third at the 2 o'clock position that is immediately adjacent to a 0.9 cm ill-defined enhancing lesion. This also  represents a biopsy-proven site of malignancy.     She underwent a left total mastectomy and sentinel node biopsy on 3/30/22 with pathology revealing 2 foci of invasive lobular carcinoma, largest focus 6cm low grade and smaller focus of 3.5mm low grade within 1mm of anterior skin surface that was removed, therefore was not consider a true margin. Invasive carcinoma directly invades into the dermis or epidermis without skin ulceration (this does not change the T classification)  0 of 7 sentinel lymph nodes were positive.  Her pathologic stage was mpT3N0/IIB.  She had tissue expanders placed at time of surgery by Dr. Montgomery.      She is  with menarche age 11, first childbirth age 18 and did not breast feed. Menopause age 39 at which time she underwent robby/bso.  She took birth control pills for 3 years and has never taken hormone replacement therapy. She has a family hx of ovarian cancer in her mother age 67, maternal grandmother with pancreatic cancer age 71, 2 brothers with liver cancer and a daughter with dcis age 49.  She had genetic testing which was negative.     Oncotype DX Score:   Recurrence Score 12, 3% 9-year recurrence risk, <1% chemo benefit    She was originally consulted for radiation therapy on 2022 with Dr. Siobhan Styles but patient requested receiving treatments here as she lives in [place], [place].    She is having issues with range of motion for treatment position and will be seeing PT/OT before CT simulation can bedone.    The following portions of the patient's history were reviewed and updated as appropriate: allergies, current medications, past family history, past medical history, past social history, past surgical history and problem list. Reviewed with the patient and remain unchanged.    PAST MEDICAL HISTORY:  she has a past medical history of Anxiety and depression, Breast cancer (HCC), Female stress incontinence, Hiatal hernia, History of left breast cancer, Hypertension,  Insomnia, and Sleep apnea.    MEDICATIONS:    Current Outpatient Medications:   •  acetaminophen (TYLENOL) 325 MG tablet, Take 2 tablets by mouth Every 4 (Four) Hours As Needed for Mild Pain ., Disp: 60 tablet, Rfl: 0  •  citalopram (CeleXA) 40 MG tablet, Take 1 tablet by mouth daily. (Patient taking differently: Take 40 mg by mouth Every Night.), Disp: 30 tablet, Rfl: 5  •  docusate sodium (COLACE) 250 MG capsule, Take 1 capsule by mouth 2 (Two) Times a Day As Needed for Constipation., Disp: 60 capsule, Rfl: 1  •  HYDROcodone-acetaminophen (NORCO) 5-325 MG per tablet, Take 1-2 tablets by mouth Every 4 (Four) Hours As Needed (Pain)., Disp: 20 tablet, Rfl: 0  •  ondansetron ODT (Zofran ODT) 8 MG disintegrating tablet, Place 1 tablet on the tongue Every 8 (Eight) Hours As Needed for Nausea or Vomiting., Disp: 10 tablet, Rfl: 1  •  propranolol LA (INDERAL LA) 160 MG 24 hr capsule, Take 1 capsule by mouth Every Night., Disp: 90 capsule, Rfl: 3  •  sennosides-docusate (PERICOLACE) 8.6-50 MG per tablet, Take 2 tablets by mouth Daily As Needed for Constipation., Disp: 30 tablet, Rfl: 1  •  spironolactone (ALDACTONE) 25 MG tablet, Take 1 tablet by mouth daily., Disp: 90 tablet, Rfl: 1  •  sulfamethoxazole-trimethoprim (Bactrim DS) 800-160 MG per tablet, Take 1 tablet every 12 hours by oral route., Disp: 7 tablet, Rfl: 0  •  zolpidem (AMBIEN) 5 MG tablet, Take 5 mg by mouth At Night As Needed., Disp: , Rfl:   •  zolpidem (AMBIEN) 5 MG tablet, Take 1 tablet by mouth nightly as needed for Sleep, Disp: 30 tablet, Rfl: 2    ALLERGIES:  No Known Allergies    PAST SURGICAL HISTORY:  she has a past surgical history that includes Hysterectomy; Cholecystectomy; Knee surgery (Right);  section; Mastectomy w/ sentinel node biopsy (Left, 3/30/2022); and Breast reconstruction (Left, 3/30/2022).    PREVIOUS RADIOTHERAPY OR CHEMOTHERAPY:  no    FAMILY HISTORY:  herfamily history includes Liver cancer in her brother and brother; Lung  cancer in her father; Ovarian cancer (age of onset: 67) in her mother; Pancreatic cancer in her maternal grandmother.    SOCIAL HISTORY:  she reports that she has never smoked. She has never used smokeless tobacco. She reports that she does not drink alcohol and does not use drugs.    PAIN AND PAIN MANAGEMENT:  No pain, some discomfort and noted limitation with movement to treatment position.    NUTRITIONAL STATUS:   no issues    KPS:  90  PHQ-9 Total Score: distress tool completed.     REVIEW OF SYSTEMS:   General: No fevers, chills, weight change, or drenching night sweats. Skin: No rashes or jaundice.  HEENT: No change in vision or hearing, no headaches.  Neck: No dysphagia or masses.  Heme/Lymph: No easy bruising or bleeding.  Respiratory System: No shortness of breath or cough.  Cardiovascular: No chest pain, palpitations, or dyspnea on exertion.  - Pacemaker. GI: No nausea, vomiting, diarrhea, melena, or hematochezia.  : No dysuria or hematuria.  Endocrine: No heat or cold intolerance. Musculoskeletal: No myalgias or arthralgias.  Neuro: No weakness, numbness, syncope, or seizures. Psych: No mood changes or depression. Ext: Denies swelling.        Objective   VITAL SIGNS:  Vitals:    05/13/22 1211   Pulse: 55   SpO2: 94%   pain 2/10      PHYSICAL EXAM:  GENERAL: In no apparent distress, sitting comfortably in room.    HEENT: Normocephalic, atraumatic. Pupils are equal, round, reactive to light. Sclera anicteric. Conjunctiva not injected. Oropharynx without erythema, ulcerations or thrush.   NECK: Supple with no masses.  LYMPHATIC: No cervical, supraclavicular or axillary adenopathy appreciated bilaterally.   CARDIOVASCULAR: S1 & S2 detected; no murmurs, rubs or gallops.  CHEST: Clear to auscultation bilaterally; no wheezes, crackles or rubs. Work of breathing normal.  ABDOMEN: Bowel sounds present. Abdomen is soft, nontender, nondistended.   MUSCULOSKELETAL: No tenderness to palpation along the spine or  scapulae. Normal range of motion.  EXTREMITIES: No clubbing, cyanosis, edema.  SKIN: No erythema, rashes, ulcerations noted.   NEUROLOGIC: Cranial nerves II-XII grossly intact bilaterally. No focal neurologic deficits.  PSYCHIATRIC:  Alert, aware, and appropriate.  BREASTS: Left breast surgically absent, expander in place, healing well--noted tenderness with moving to supine radiation treatment position.    PERTINENT IMAGING/PATHOLOGY/LABS (Medical Decision Making):     COORDINATION OF CARE: A copy of this note is sent to the referring provider.    PATHOLOGY (Reviewed): reviewed     IMAGING (Reviewed): reviewed.    LABS (Reviewed):  HEMATOLOGY:  WBC   Date Value Ref Range Status   04/22/2022 7.34 3.40 - 10.80 10*3/mm3 Final   02/26/2019 5.8 4.5 - 11.0 10*3/uL Final     RBC   Date Value Ref Range Status   04/22/2022 4.64 3.77 - 5.28 10*6/mm3 Final   02/26/2019 4.68 4.0 - 6.0 10*6/uL Final     Hemoglobin   Date Value Ref Range Status   04/22/2022 14.2 12.0 - 15.9 g/dL Final   02/26/2019 14.6 12.0 - 16.0 g/dL Final     Hematocrit   Date Value Ref Range Status   04/22/2022 42.7 34.0 - 46.6 % Final   02/26/2019 42.2 33.0 - 51.0 % Final     Platelets   Date Value Ref Range Status   04/22/2022 185 140 - 450 10*3/mm3 Final   02/26/2019 153 150 - 450 10*3/uL Final     CHEMISTRY:  Lab Results   Component Value Date    GLUCOSE 120 04/22/2022    BUN 24 (H) 04/22/2022    CREATININE 1.03 04/22/2022    BCR 23.3 04/22/2022    K 3.9 04/22/2022    CO2 28.7 04/22/2022    CALCIUM 9.7 04/22/2022    ALBUMIN 3.80 04/22/2022    LABIL2 1.2 12/09/2021    AST 20 04/22/2022    ALT 24 04/22/2022       Assessment & Plan   ASSESSMENT AND PLAN:    -67 yo female with [id number] ILC of the L breast s/p mastectomy with tissue expander  -Agree with recommendation for PMRT given tumor size of > 6 cm.  She did have 7 nodes all removed and negative.    She will be seeing PT/OT to help with mobility and I will rearrange the CT sim from 5/24/22.  Dosing of  50 Gy in 25 fractions recommended.    This assessment comes from my review of the imaging, pathology, physician notes and other pertinent information as mentioned.    I spent 17 minutes caring for Mary Ann on this date of service. This time includes time spent by me in the following activities: preparing for the visit, reviewing tests, obtaining and/or reviewing a separately obtained history, performing a medically appropriate examination and/or evaluation, counseling and educating the patient/family/caregiver, documenting information in the medical record and care coordination            CC: Catherine Isaac MD      Approved by:     Ron Cook MD

## 2022-05-13 ENCOUNTER — OFFICE VISIT (OUTPATIENT)
Dept: RADIATION ONCOLOGY | Facility: HOSPITAL | Age: 67
End: 2022-05-13

## 2022-05-13 ENCOUNTER — HOSPITAL ENCOUNTER (OUTPATIENT)
Dept: PHYSICAL THERAPY | Facility: HOSPITAL | Age: 67
Setting detail: THERAPIES SERIES
Discharge: HOME | End: 2022-05-13

## 2022-05-13 VITALS — WEIGHT: 271.8 LBS | HEART RATE: 55 BPM | OXYGEN SATURATION: 94 % | BODY MASS INDEX: 45.95 KG/M2

## 2022-05-13 DIAGNOSIS — Z17.0 MALIGNANT NEOPLASM OF UPPER-OUTER QUADRANT OF LEFT BREAST IN FEMALE, ESTROGEN RECEPTOR POSITIVE: ICD-10-CM

## 2022-05-13 DIAGNOSIS — Z91.89 AT RISK FOR LYMPHEDEMA: ICD-10-CM

## 2022-05-13 DIAGNOSIS — Z17.0 MALIGNANT NEOPLASM OF UPPER-OUTER QUADRANT OF LEFT BREAST IN FEMALE, ESTROGEN RECEPTOR POSITIVE: Primary | ICD-10-CM

## 2022-05-13 DIAGNOSIS — C50.412 MALIGNANT NEOPLASM OF UPPER-OUTER QUADRANT OF LEFT BREAST IN FEMALE, ESTROGEN RECEPTOR POSITIVE: ICD-10-CM

## 2022-05-13 DIAGNOSIS — Z90.12 S/P MASTECTOMY, LEFT: Primary | ICD-10-CM

## 2022-05-13 DIAGNOSIS — C50.412 MALIGNANT NEOPLASM OF UPPER-OUTER QUADRANT OF LEFT BREAST IN FEMALE, ESTROGEN RECEPTOR POSITIVE: Primary | ICD-10-CM

## 2022-05-13 LAB
BACTERIA SPEC AEROBE CULT: NORMAL
GRAM STN SPEC: NORMAL
GRAM STN SPEC: NORMAL

## 2022-05-13 PROCEDURE — G0463 HOSPITAL OUTPT CLINIC VISIT: HCPCS | Performed by: RADIOLOGY

## 2022-05-13 PROCEDURE — 97161 PT EVAL LOW COMPLEX 20 MIN: CPT

## 2022-05-13 PROCEDURE — 99213 OFFICE O/P EST LOW 20 MIN: CPT | Performed by: RADIOLOGY

## 2022-05-13 PROCEDURE — 97110 THERAPEUTIC EXERCISES: CPT

## 2022-05-13 PROCEDURE — 97535 SELF CARE MNGMENT TRAINING: CPT

## 2022-05-13 PROCEDURE — 93702 BIS XTRACELL FLUID ANALYSIS: CPT

## 2022-05-13 NOTE — THERAPY EVALUATION
Physical Therapy Lymphedema Initial Evaluation  [facility]     Patient Name: Mary Ann Michaels  : 1955  MRN: [id number]  Today's Date: 2022      Visit Date: 2022    Visit Dx:    ICD-10-CM ICD-9-CM   1. S/P mastectomy, left  Z90.12 V45.71   2. At risk for lymphedema  Z91.89 V49.89       Patient Active Problem List   Diagnosis   • Breast cancer of upper-outer quadrant of left female breast (HCC)        Past Medical History:   Diagnosis Date   • Anxiety and depression    • Breast cancer (HCC)    • Female stress incontinence    • Hiatal hernia    • History of left breast cancer    • Hypertension    • Insomnia    • Sleep apnea     CPAP        Past Surgical History:   Procedure Laterality Date   • BREAST RECONSTRUCTION Left 3/30/2022    Procedure: LEFT PLACEMENT TISSUE EXPANDER AND ALLODERM;  Surgeon: Raj Montgomery MD;  Location: [facility];  Service: Plastics;  Laterality: Left;   •  SECTION     • CHOLECYSTECTOMY     • HYSTERECTOMY     • KNEE SURGERY Right     ARTHROSCOPY TENDON REPAIR   • MASTECTOMY W/ SENTINEL NODE BIOPSY Left 3/30/2022    Procedure: left total mastectomy with left axillary sentinel lymph node biopsy;  Surgeon: Catherine Isaac MD;  Location: [facility];  Service: General;  Laterality: Left;       Visit Dx:    ICD-10-CM ICD-9-CM   1. S/P mastectomy, left  Z90.12 V45.71   2. At risk for lymphedema  Z91.89 V49.89        Patient History     Row Name 22 0800             History    Chief Complaint Tightness  -LB      Date Current Problem(s) Began 22  -LB      Brief Description of Current Complaint Pt s/p L mastectomy with DEEP SLNB 3/30/22 performed  by Dr. Isaac with expander placement by Dr. Montgomery. Recent infection L breast treated with antibiotics. She sees radiation oncologist today. She has not moved LUE yet but trialed today. She is tight and sore in L axilla. 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 2 kids, 6 grandkids,  upcoming grandson wedding in July. She is frustrated with recent infection and ready to move on with her care. She is R handed.  -LB      Previous treatment for THIS PROBLEM Surgery  -LB      Surgery Date: 03/30/22  -LB      Patient/Caregiver Goals Return to prior level of function;Know what to do to help the symptoms  -LB      Hand Dominance right-handed  -LB      Occupation/sports/leisure activities RN from home  -LB      Patient seeing anyone else for problem(s)? yes  -LB      History of Previous Related Injuries none  -LB              Pain     Pain at Present 2  -LB      Pain at Best 2  -LB      Pain at Worst 2  -LB      Pain Frequency Intermittent  -LB      Pain Description Tightness;Sore;Aching  -LB      What Performance Factors Make the Current Problem(s) WORSE? LUE movement  -LB      What Performance Factors Make the Current Problem(s) BETTER? LUE rest  -LB      Is your sleep disturbed? No  -LB      Difficulties at work? Returns to work Monday.  -LB      Difficulties with ADL's? Able to perform.  -LB      Difficulties with recreational activities? Denies recreational activities.  -LB              Fall Risk Assessment    Any falls in the past year: No  -LB              Services    Prior Rehab/Home Health Experiences No  -LB      Are you currently receiving Home Health services No  -LB      Do you plan to receive Home Health services in the near future No  -LB              Daily Activities    Primary Language English  -LB      Pt Participated in POC and Goals Yes  -LB              Safety    Are you being hurt, hit, or frightened by anyone at home or in your life? No  -LB      Are you being neglected by a caregiver No  -LB      Have you had any of the following issues with N/A  -LB            User Key  (r) = Recorded By, (t) = Taken By, (c) = Cosigned By    Initials Name Provider Type    Cheli Devlin PT Physical Therapist                 Lymphedema     Row Name 05/13/22 0900             Subjective Pain     Able to rate subjective pain? yes  -LB      Pre-Treatment Pain Level 2  -LB              Subjective Comments    Subjective Comments I am doing ok, just frustrated with the delay in treatment due to this infection.  -LB              Lymphedema Assessment    Lymphedema Classification LUE:;at risk/stage 0  -LB      Lymphedema Cancer Related Sx left;radical mastectomy;lumpectomy  -LB      Lymph Nodes Removed # 7  -LB      Positive Lymph Nodes # 0  -LB      Chemo Received no  -LB      Radiation Therapy Received yes  upcoming  -LB      Infections or Cellulitis? yes  currently on antibiotics  -LB              Posture/Observations    Posture/Observations Comments forward head, rounded shoulders, obvious redness L chest  -LB              General ROM    GENERAL ROM COMMENTS LUE grossly 90 deg flexion/abduction  -LB              MMT (Manual Muscle Testing)    General MMT Comments LUE grossly 3+/5 in dec ROM  -LB              Lymphedema Edema Assessment    Edema Assessment Comment no obvious edema  -LB              Skin Changes/Observations    Skin Observations Comment area of redness LUQ, treated with antibiotics  -LB              Lymphedema Sensation    Lymphedema Sensation Comments intact  -LB              Lymphedema Pulses/Capillary Refill    Lymph Pulses Capillary Refill Comments intact  -LB              LUE Quick Girth (cm)    Axilla 55 cm  -LB      Mid upper arm 47.5 cm  -LB      Elbow 36.5 cm  -LB      Mid forearm 32 cm  -LB      Wrist crease 20 cm  -LB      LUE Quick Girth Total 191  -LB              Compression/Skin Care    Compression/Skin Care Comments discussed compression garment use and wear schedule  -LB              L-Dex Bioimpedence Screening    L-Dex Measurement Extremity LUE  -LB      L-Dex Patient Position Standing  -LB      L-Dex UE Dominate Side Right  -LB      L-Dex UE At Risk Side Left  -LB      L-Dex UE Pre Surgical Value No  -LB      L-Dex UE Score 1.5  -LB      L-Dex UE Baseline Score 1.5  -LB       L-Dex UE Value Change 0  -LB      L-Dex UE Comment WNL  -LB      $ L-Dex Charge yes  -LB            User Key  (r) = Recorded By, (t) = Taken By, (c) = Cosigned By    Initials Name Provider Type    Cheli Devlin, PT Physical Therapist                                Therapy Education  Education Details: issued MED[employer]onge HEP, discussed compression garment during radiation, lymphedema s/s, bioimpedance results and interpretation, use of LUE  Given: HEP, Symptoms/condition management, Mobility training  Program: New  How Provided: Verbal, Demonstration, Written  Provided to: Patient  Level of Understanding: Verbalized, Demonstrated, Teach back education performed  69083 - PT Self Care/Mgmt Minutes: 15       OP Exercises     Row Name 05/13/22 0900             Subjective Comments    Subjective Comments I am doing ok, just frustrated with the delay in treatment due to this infection.  -LB              Subjective Pain    Able to rate subjective pain? yes  -LB      Pre-Treatment Pain Level 2  -LB              Total Minutes    20680 - PT Therapeutic Exercise Minutes 15  -LB              Exercise 1    Exercise Name 1 shoulder rolls  -LB      Reps 1 10  -LB              Exercise 2    Exercise Name 2 scap retraction  -LB      Reps 2 10  -LB      Time 2 5  -LB              Exercise 3    Exercise Name 3 wall wash flexion  -LB      Reps 3 10  -LB              Exercise 4    Exercise Name 4 supine AAROM flexion  -LB      Reps 4 10  -LB              Exercise 5    Exercise Name 5 seated butterfly stretch  -LB      Reps 5 3  -LB      Time 5 20  -LB            User Key  (r) = Recorded By, (t) = Taken By, (c) = Cosigned By    Initials Name Provider Type    Cheli Devlin PT Physical Therapist                             PT OP Goals     Row Name 05/13/22 0900          Long Term Goals    LTG Date to Achieve 06/12/22  -LB     LTG 1 Pt will maintain LDex bioimpedance WNL.  -LB     LTG 1 Progress New  -LB     LTG 2 Pt will acquire  appropriately fitted compression garment and verbalize appropriate wear schedule.  -LB     LTG 2 Progress New  -LB     LTG 3 Pt will demonstrate grossly 120 deg flexion/abduction LUE to improve her tolerane to radiation.  -LB     LTG 3 Progress New  -LB     LTG 4 Pt will report reduced tightness and discomfort L axilla to minimal.  -LB     LTG 4 Progress New  -LB            Time Calculation    PT Goal Re-Cert Due Date 08/11/22  -LB           User Key  (r) = Recorded By, (t) = Taken By, (c) = Cosigned By    Initials Name Provider Type    LB Cheli Tracey, PT Physical Therapist                 PT Assessment/Plan     Row Name 05/13/22 0936          PT Assessment    Functional Limitations Limitations in functional capacity and performance;Limitation in home management;Performance in work activities;Limitations in community activities  -LB     Impairments Impaired flexibility;Impaired lymphatic circulation;Impaired muscle length;Joint mobility;Muscle strength;Pain;Poor body mechanics;Posture;Range of motion;Sensation  -LB     Assessment Comments Mary Ann Michaels is a 66 y.o. female recently diagnosed with Left Breast Cancer, presents to therapy in evolving condition, s/p Left Modified Radical Mastectomywith [place] Node Biopsy with immediate reconstruction with Prepectoral Tissue expander / (+) Left performed on 3/30/22 by surgeons Dr. Isaac/Ulises. Mary Ann Michaels is at increased risk of post Mastectomy lymphedema syndrome Left Upper Extremity due to Radiation therapy Breast surgery Lymph Node Removal . She presents with post-operative decreased range of motion, flexibility, strength, function and increased pain. Currently, negative s/s of lymphedema, infection, seroma, or axillary cording. We did complete a baseline post operative bioimpedance assessment, with current L-Dex score of 1.5 , which is WNL. At this time, functional limitations can be affected by but not limited to decreased AROM/strength LUE, L axillary  tightness. Mary Ann Michaels will benefit from skilled formal Breast Care Physical Therapy at this time to address listed dysfunctions. Please refer to Plan.  -LB     Please refer to paper survey for additional self-reported information Yes  -LB     Rehab Potential Good  -LB     Patient/caregiver participated in establishment of treatment plan and goals Yes  -LB     Patient would benefit from skilled therapy intervention Yes  -LB            PT Plan    PT Frequency 1x/week;Other (comment)  -LB     Predicted Duration of Therapy Intervention (PT) repeat bioimpedance post radiation, return 1x/week as needed for L shoulder ROM to prepare for radiation  -LB     Planned CPT's? PT EVAL LOW COMPLEXITY: 44036;PT RE-EVAL: 44383;PT THER PROC EA 15 MIN: 13218;PT THER ACT EA 15 MIN: 24205;PT MANUAL THERAPY EA 15 MIN: 50722;PT NEUROMUSC RE-EDUCATION EA 15 MIN: 91068;PT SELF CARE/HOME MGMT/TRAIN EA 15: 76877;PT BIS XTRACELL FLUID ANALYSIS: 07011  -LB     PT Plan Comments compression garment?, radiation follow up- bioimpedance, L shoulder ROM, discomfort L axilla, infection, return to filling/scheduling implant exchange,  -LB           User Key  (r) = Recorded By, (t) = Taken By, (c) = Cosigned By    Initials Name Provider Type    Cheli Devlin, HERMINIO Physical Therapist                   Outcome Measure Options: Quick DASH  Quick DASH  Open a tight or new jar.: No Difficulty  Do heavy household chores (e.g., wash walls, wash floors): No Difficulty  Carry a shopping bag or briefcase: No Difficulty  Wash your back: No Difficulty  Use a knife to cut food: No Difficulty  Recreational activities in which you take some force or impact through your arm, should or hand (e.g. golf, hammering, tennis, etc.): Unable  During the past week, to what extent has your arm, shoulder, or hand problem interfered with your normal social activites with family, friends, neighbors or groups?: Not at all  During the past week, were you limited in your work or  other regular daily activities as a result of your arm, shoulder or hand problem?: Not limited at all  Arm, Shoulder, or hand pain: Mild  Tingling (pins and needles) in your arm, shoulder, or hand: None  During the past week, how much difficulty have you had sleeping because of the pain in your arm, shoulder or hand?: Moderate Difficiculty  Number of Questions Answered: 11  Quick DASH Score: 15.91         Time Calculation:   Start Time: 0845  Stop Time: 0930  Time Calculation (min): 45 min  Total Timed Code Minutes- PT: 30 minute(s)  Timed Charges  69331 - PT Therapeutic Exercise Minutes: 15  51067 - PT Self Care/Mgmt Minutes: 15  Total Minutes  Timed Charges Total Minutes: 15   Total Minutes: 15   Therapy Charges for Today     Code Description Service Date Service Provider Modifiers Qty    06506985271 HC PT BIS XTRACELL FLUID ANALYSIS 5/13/2022 Cheli Tracey, PT  1    05116855598 HC PT SELF CARE/MGMT/TRAIN EA 15 MIN 5/13/2022 Cheli Tracey, PT GP 1    81695296256 HC PT THER PROC EA 15 MIN 5/13/2022 Cheli Tracey, PT GP 1    63757673389 HC PT EVAL LOW COMPLEXITY 1 5/13/2022 Cheli Tracey, PT GP 1          PT G-Codes  Outcome Measure Options: Quick DASH  Quick DASH Score: 15.91         Cheli Tracey PT  5/13/2022

## 2022-05-18 ENCOUNTER — LAB REQUISITION (OUTPATIENT)
Dept: LAB | Facility: HOSPITAL | Age: 67
End: 2022-05-18

## 2022-05-18 DIAGNOSIS — Z00.00 ENCOUNTER FOR GENERAL ADULT MEDICAL EXAMINATION WITHOUT ABNORMAL FINDINGS: ICD-10-CM

## 2022-05-18 PROCEDURE — 87205 SMEAR GRAM STAIN: CPT | Performed by: PLASTIC SURGERY

## 2022-05-18 PROCEDURE — 87070 CULTURE OTHR SPECIMN AEROBIC: CPT | Performed by: PLASTIC SURGERY

## 2022-05-21 LAB
BACTERIA SPEC AEROBE CULT: NORMAL
GRAM STN SPEC: NORMAL
GRAM STN SPEC: NORMAL

## 2022-05-24 ENCOUNTER — HOSPITAL ENCOUNTER (OUTPATIENT)
Dept: RADIATION ONCOLOGY | Facility: HOSPITAL | Age: 67
Discharge: HOME | End: 2022-05-24

## 2022-05-24 PROCEDURE — 77334 RADIATION TREATMENT AID(S): CPT | Performed by: RADIOLOGY

## 2022-05-24 PROCEDURE — 77290 THER RAD SIMULAJ FIELD CPLX: CPT | Performed by: RADIOLOGY

## 2022-05-27 ENCOUNTER — OFFICE VISIT (OUTPATIENT)
Dept: ONCOLOGY | Facility: CLINIC | Age: 67
End: 2022-05-27

## 2022-05-27 ENCOUNTER — APPOINTMENT (OUTPATIENT)
Dept: LAB | Facility: HOSPITAL | Age: 67
End: 2022-05-27

## 2022-05-27 VITALS
OXYGEN SATURATION: 97 % | SYSTOLIC BLOOD PRESSURE: 125 MMHG | HEIGHT: 64 IN | DIASTOLIC BLOOD PRESSURE: 77 MMHG | TEMPERATURE: 96.9 F | BODY MASS INDEX: 47.22 KG/M2 | HEART RATE: 62 BPM | WEIGHT: 276.6 LBS | RESPIRATION RATE: 16 BRPM

## 2022-05-27 DIAGNOSIS — Z17.0 MALIGNANT NEOPLASM OF UPPER-OUTER QUADRANT OF LEFT BREAST IN FEMALE, ESTROGEN RECEPTOR POSITIVE: Primary | ICD-10-CM

## 2022-05-27 DIAGNOSIS — C50.412 MALIGNANT NEOPLASM OF UPPER-OUTER QUADRANT OF LEFT BREAST IN FEMALE, ESTROGEN RECEPTOR POSITIVE: Primary | ICD-10-CM

## 2022-05-27 PROCEDURE — 99214 OFFICE O/P EST MOD 30 MIN: CPT | Performed by: INTERNAL MEDICINE

## 2022-05-27 RX ORDER — ANASTROZOLE 1 MG/1
1 TABLET ORAL DAILY
Qty: 30 TABLET | Refills: 3 | Status: SHIPPED | OUTPATIENT
Start: 2022-05-27 | End: 2022-11-28 | Stop reason: SDUPTHER

## 2022-05-27 NOTE — PROGRESS NOTES
Subjective   Mary Ann Michaels is a 66 y.o. female.  Referred by Dr. Isaac for left breast invasive lobular carcinoma    History of Present Illness    is a 66-year-old postmenopausal  lady with hypertension presented with a left breast screening abnormality.  Family history is significant for her daughter with DCIS and currently on tamoxifen at the age of 49, mother with history of ovarian cancer at the age of 67, maternal grandmother with history of pancreatic cancer in her 70s, 2 brothers with liver cancer.    12/7/2021-bilateral screening mammogram-new left breast focal asymmetry.  Diagnostic mammogram recommended.  Left breast ultrasound recommended.  Right breast is benign.    12/17/2021-left breast diagnostic mammogram and ultrasound  6 mm indistinct mass in the outer slightly upper left breast, posterior depth.  Ultrasound shows a 5 x 4 x 5 mm indistinct hypoechoic mass at 2:00, 9 cm from the nipple.  Ultrasound-guided biopsy of the left breast mass recommended.    1/12/2022-ultrasound-guided biopsy  1.specimen 1-left breast 2:00, 15 cm from the nipple-invasive lobular carcinoma, largest tumor focus 5 mm  Negative for carcinoma in situ or lymphovascular space invasion.  ER positive strong 100%  DC positive strong 100%  HER2 negative    Specimen to-left breast 2:00, 9 cm from the nipple  Invasive lobular carcinoma, focal atypical lobular hyperplasia  Negative for carcinoma in situ or lymphovascular space invasion.    1/30/2022-bilateral breast MRI  V shaped metallic clip is seen at the 2 o'clock position in the upper outer quadrant of the left breast which does not show any surrounding enhancement.  But this is the biopsy-proven site of malignancy.  This is posterior to a 5.2 cm clumped enhancement.  This is suspicious for additional disease.  No evidence of left axillary lymphadenopathy.  There is a more posterior located culture metallic clip in the posterior one third at 2 o'clock position  which is immediately adjacent to a 0.9 cm ill-defined enhancing lesion.  This represents biopsy-proven site of malignancy.  No other suspicious findings in the right breast.    She was seen by Dr. Isaac who performed a left mastectomy and sentinel lymph node biopsy on 3/30/2022  Pathology was consistent with invasive lobular carcinoma, 2 foci.  Larger focus measures 6 cm and the smaller focus measures 3.5 mm and noted in the nipple.  No skin ulceration.  7 sentinel lymph nodes were evaluated and negative for malignancy.  Both the tumors were strongly ER/OH positive at 99%, HER2 negative and Ki-67 was 12 to 13% on the larger tumor and 8% on the smaller tumor.    She is undergoing reconstruction by Dr. Montgomery.  She presents today accompanied by her friend Natalya to discuss adjuvant therapy.    Oncotype DX recurrence score noted to be 12 with a 9-year risk of distant metastasis of 3% with tamoxifen or AI alone.  And less than 1% benefit from chemotherapy.    Ms. Michaels has met with Dr. Cook and scheduled to start radiation next week.    She had an infection of the left breast expander and currently on antibiotics for the same.  The erythema and pain have improved since being on antibiotics.    The following portions of the patient's history were reviewed and updated as appropriate: allergies, current medications, past family history, past medical history, past social history, past surgical history and problem list.    Past Medical History:   Diagnosis Date   • Anxiety and depression    • Breast cancer (HCC)    • Female stress incontinence    • Hiatal hernia    • History of left breast cancer    • Hypertension    • Insomnia    • Sleep apnea     CPAP        Past Surgical History:   Procedure Laterality Date   • BREAST RECONSTRUCTION Left 3/30/2022    Procedure: LEFT PLACEMENT TISSUE EXPANDER AND ALLODERM;  Surgeon: Raj Montgomery MD;  Location: [facility];  Service: Plastics;  Laterality: Left;   •  " SECTION     • CHOLECYSTECTOMY     • HYSTERECTOMY     • KNEE SURGERY Right     ARTHROSCOPY TENDON REPAIR   • MASTECTOMY W/ SENTINEL NODE BIOPSY Left 3/30/2022    Procedure: left total mastectomy with left axillary sentinel lymph node biopsy;  Surgeon: Catherine Isaac MD;  Location: [facility];  Service: General;  Laterality: Left;        Family History   Problem Relation Age of Onset   • Ovarian cancer Mother 67   • Lung cancer Father    • Liver cancer Brother    • Liver cancer Brother    • Pancreatic cancer Maternal Grandmother    • Malig Hyperthermia Neg Hx         Social History     Socioeconomic History   • Marital status:    Tobacco Use   • Smoking status: Never Smoker   • Smokeless tobacco: Never Used   Vaping Use   • Vaping Use: Never used   Substance and Sexual Activity   • Alcohol use: Never   • Drug use: Never   • Sexual activity: Defer        OB History    No obstetric history on file.     Age at menarche-11  Age at first live childbirth-18  She had a hysterectomy and a bilateral salpingo-oophorectomy at the age of 39  No use of hormone replacement therapy  Oral contraceptive pill use for 2 to 3 years   3 para 2  1    No Known Allergies         Review of Systems   Constitutional: Positive for unexpected weight gain.   HENT: Negative.    Eyes: Negative.    Respiratory: Negative.    Cardiovascular: Negative.    Gastrointestinal: Negative.    Endocrine: Negative.    Genitourinary: Negative.    Musculoskeletal: Negative.    Allergic/Immunologic: Negative.    Hematological: Negative.    Psychiatric/Behavioral: Negative.      Review of systems as mentioned in the HPI    Objective   Blood pressure 125/77, pulse 62, temperature 96.9 °F (36.1 °C), temperature source Temporal, resp. rate 16, height 163.8 cm (64.49\"), weight 125 kg (276 lb 9.6 oz), SpO2 97 %, not currently breastfeeding.   Physical Exam  Vitals reviewed.   Constitutional:       Appearance: Normal appearance. " She is obese.   HENT:      Head: Normocephalic.      Nose: Nose normal.      Mouth/Throat:      Mouth: Mucous membranes are moist.   Eyes:      Conjunctiva/sclera: Conjunctivae normal.      Pupils: Pupils are equal, round, and reactive to light.   Cardiovascular:      Rate and Rhythm: Normal rate.   Pulmonary:      Effort: Pulmonary effort is normal.   Abdominal:      General: Abdomen is flat.   Musculoskeletal:         General: Normal range of motion.      Cervical back: Normal range of motion.   Skin:     General: Skin is warm.   Neurological:      General: No focal deficit present.      Mental Status: She is alert and oriented to person, place, and time. Mental status is at baseline.   Psychiatric:         Mood and Affect: Mood normal.         Behavior: Behavior normal.         Thought Content: Thought content normal.         Judgment: Judgment normal.       Breast Exam: Left breast status post mastectomy with expander in place.  There is some erythema but no increased warmth of the left breast.  Incision healing well.  Right breast not examined today.    I have reexamined the patient and the results are consistent with the previously documented exam. Barbie Dhillon MD     Lab Requisition on 05/18/2022   Component Date Value Ref Range Status   • Wound Culture 05/18/2022 No growth at 3 days   Final   • Gram Stain 05/18/2022 Few (2+) WBCs per low power field   Final   • Gram Stain 05/18/2022 No organisms seen   Final   Lab Requisition on 05/11/2022   Component Date Value Ref Range Status   • Wound Culture 05/11/2022 Rare Normal Skin Velma   Final   • Gram Stain 05/11/2022 Few (2+) WBCs per low power field   Final   • Gram Stain 05/11/2022 No organisms seen   Final        No radiology results for the last 30 days.       Assessment & Plan       *Left breast invasive lobular carcinoma  · pT3 multifocal N0 M0  · Clinical prognostic stage II, pathological prognostic stage I.   2 foci of invasive lobular carcinoma  noted 1 was 60 mm and the second was 3.5 mm.  · Margins negative  · Both the tumors were strongly ER/AL positive HER2 negative with a low Ki-67, grade 1  · Oncotype DX recurrence score 12 with a 3% risk of distant metastasis with AI or tamoxifen alone and less than 1% benefit from chemotherapy  · Discussed the results of Oncotype DX recurrence score with patient.  · She will be started on anastrozole 1 mg p.o. daily on completion of radiation.  · Adverse effects of anastrozole including but not limited to hot flashes, mood changes, fatigue, decrease in bone mineral density, arthralgias and myalgias sexual dysfunction, vaginal dryness discussed.    *Bone health  · Patient had a recent DEXA scan the results of which are not available to us.  · Obtain DEXA results    *Hypertension  · Blood pressure well controlled at 125/77  · Continue current medication    *Family history of breast cancer  · Genetic testing performed and negative for any pathogenic mutation.    *Follow-up-3 to 4 months

## 2022-06-02 ENCOUNTER — NURSE NAVIGATOR (OUTPATIENT)
Dept: OTHER | Facility: HOSPITAL | Age: 67
End: 2022-06-02

## 2022-06-02 NOTE — PROGRESS NOTES
Call placed to patient for f/u telephone visit. Introduced self and explained role. Patient states she is feeling well and will be starting radiation next week at [facility] with Dr. Cook. Says she plans to receive 25 treatments total and will start Anastrozole (Arimidex) 1 mg tablet once XRT is completed. No questions or concerns voiced at this time. Encouraged to contact oncology nurse navigator with any questions or concerns. Informed her oncology nurse navigator will continue to follow......Shahnaz Obrien RN Oncology Nurse Navigator

## 2022-06-07 ENCOUNTER — HOSPITAL ENCOUNTER (OUTPATIENT)
Dept: RADIATION ONCOLOGY | Facility: HOSPITAL | Age: 67
Setting detail: RADIATION/ONCOLOGY SERIES
End: 2022-06-07

## 2022-06-12 PROCEDURE — 77300 RADIATION THERAPY DOSE PLAN: CPT | Performed by: RADIOLOGY

## 2022-06-12 PROCEDURE — 77338 DESIGN MLC DEVICE FOR IMRT: CPT | Performed by: RADIOLOGY

## 2022-06-12 PROCEDURE — 77301 RADIOTHERAPY DOSE PLAN IMRT: CPT | Performed by: RADIOLOGY

## 2022-06-12 PROCEDURE — 77293 RESPIRATOR MOTION MGMT SIMUL: CPT | Performed by: RADIOLOGY

## 2022-06-13 ENCOUNTER — EDUCATION (OUTPATIENT)
Dept: RADIATION ONCOLOGY | Facility: HOSPITAL | Age: 67
End: 2022-06-13

## 2022-06-13 ENCOUNTER — HOSPITAL ENCOUNTER (OUTPATIENT)
Dept: RADIATION ONCOLOGY | Facility: HOSPITAL | Age: 67
Discharge: HOME | End: 2022-06-13

## 2022-06-13 LAB
RAD ONC ARIA COURSE ID: NORMAL
RAD ONC ARIA COURSE LAST TREATMENT DATE: NORMAL
RAD ONC ARIA COURSE START DATE: NORMAL
RAD ONC ARIA COURSE TREATMENT ELAPSED DAYS: 0
RAD ONC ARIA FIRST TREATMENT DATE: NORMAL
RAD ONC ARIA PLAN FRACTIONS TREATED TO DATE: 1
RAD ONC ARIA PLAN ID: NORMAL
RAD ONC ARIA PLAN PRESCRIBED DOSE PER FRACTION: 2 GY
RAD ONC ARIA PLAN PRIMARY REFERENCE POINT: NORMAL
RAD ONC ARIA PLAN TOTAL FRACTIONS PRESCRIBED: 25
RAD ONC ARIA PLAN TOTAL PRESCRIBED DOSE: 5000 CGY
RAD ONC ARIA REFERENCE POINT DOSAGE GIVEN TO DATE: 2 GY
RAD ONC ARIA REFERENCE POINT ID: NORMAL
RAD ONC ARIA REFERENCE POINT SESSION DOSAGE GIVEN: 2 GY

## 2022-06-13 PROCEDURE — 77385: CPT | Performed by: RADIOLOGY

## 2022-06-13 PROCEDURE — 77427 RADIATION TX MANAGEMENT X5: CPT | Performed by: RADIOLOGY

## 2022-06-13 PROCEDURE — G6002 STEREOSCOPIC X-RAY GUIDANCE: HCPCS | Performed by: RADIOLOGY

## 2022-06-13 NOTE — PROGRESS NOTES
Patient Name: Mary Ann Michaels Date: 2022       : 1955        MRN #: [id number] Diagnosis:   Encounter Diagnosis   Name Primary?   • Malignant neoplasm of upper-outer quadrant of left breast in female, estrogen receptor positive (HCC) Yes                     RADIATION ON TREATMENT VISIT NOTE - BREAST    Treatment Summary    Treatment Site Ref. ID Energy Dose/Fx (cGy) #Fx Dose Correction (cGy) Total Dose (cGy) Start Date End Date Elapsed Days   [id number] Lt Chestwall 6X 200  0 400 2022 1       General:           Review of Systems    [x] No new complaints  [] Skin itching   [] Skin breakdown  [] Breast swelling   [] Dysphagia   [] Dry cough  [] Productive cough   [] Fever/chills  [] Skin soreness with pain, severity: ----------------   [] Breast pain,  severity: ----------------, location:   [] Fatigue,  severity: ----------------      Skin regimen: Other: Eucerin QID    [x] Patient given standard skin care instructions for breast cancer treatment including no aluminum containing antiperspirants & no unapproved lotions or soaps.  Patient informed to notify radiation staff if irritation/itching occurs.    Comments/Notes:  C/o of nausea last night but resolved this morning.    KPS: 90%       Vital Signs: /83   Pulse 59   Resp 24   Wt 123 kg (271 lb 6.4 oz)   LMP  (LMP Unknown)   SpO2 96%   BMI 45.88 kg/m²     Weight:   Wt Readings from Last 3 Encounters:   22 123 kg (271 lb 6.4 oz)   22 125 kg (276 lb 9.6 oz)   22 123 kg (271 lb 12.8 oz)       Medication:   Current Outpatient Medications:   •  acetaminophen (TYLENOL) 325 MG tablet, Take 2 tablets by mouth Every 4 (Four) Hours As Needed for Mild Pain ., Disp: 60 tablet, Rfl: 0  •  anastrozole (Arimidex) 1 MG tablet, Take 1 tablet by mouth Daily., Disp: 30 tablet, Rfl: 3  •  citalopram (CeleXA) 40 MG tablet, Take 1 tablet by mouth daily. (Patient taking differently: Take 40 mg by mouth Every Night.), Disp:  30 tablet, Rfl: 5  •  docusate sodium (COLACE) 250 MG capsule, Take 1 capsule by mouth 2 (Two) Times a Day As Needed for Constipation., Disp: 60 capsule, Rfl: 1  •  HYDROcodone-acetaminophen (NORCO) 5-325 MG per tablet, Take 1-2 tablets by mouth Every 4 (Four) Hours As Needed (Pain)., Disp: 20 tablet, Rfl: 0  •  ondansetron ODT (Zofran ODT) 8 MG disintegrating tablet, Place 1 tablet on the tongue Every 8 (Eight) Hours As Needed for Nausea or Vomiting., Disp: 10 tablet, Rfl: 1  •  propranolol LA (INDERAL LA) 160 MG 24 hr capsule, Take 1 capsule by mouth Every Night., Disp: 90 capsule, Rfl: 3  •  sennosides-docusate (PERICOLACE) 8.6-50 MG per tablet, Take 2 tablets by mouth Daily As Needed for Constipation., Disp: 30 tablet, Rfl: 1  •  spironolactone (ALDACTONE) 25 MG tablet, Take 1 tablet by mouth daily., Disp: 90 tablet, Rfl: 1  •  sulfamethoxazole-trimethoprim (Bactrim DS) 800-160 MG per tablet, Take 1 tablet every 12 hours by oral route for 10 days., Disp: 20 tablet, Rfl: 0  •  zolpidem (AMBIEN) 5 MG tablet, Take 5 mg by mouth At Night As Needed., Disp: , Rfl:   •  zolpidem (AMBIEN) 5 MG tablet, Take 1 tablet by mouth nightly as needed for Sleep, Disp: 30 tablet, Rfl: 2       LABS (Reviewed):  Hematology WBC   Date Value Ref Range Status   04/22/2022 7.34 3.40 - 10.80 10*3/mm3 Final   02/26/2019 5.8 4.5 - 11.0 10*3/uL Final     RBC   Date Value Ref Range Status   04/22/2022 4.64 3.77 - 5.28 10*6/mm3 Final   02/26/2019 4.68 4.0 - 6.0 10*6/uL Final     Hemoglobin   Date Value Ref Range Status   04/22/2022 14.2 12.0 - 15.9 g/dL Final   02/26/2019 14.6 12.0 - 16.0 g/dL Final     Hematocrit   Date Value Ref Range Status   04/22/2022 42.7 34.0 - 46.6 % Final   02/26/2019 42.2 33.0 - 51.0 % Final     Platelets   Date Value Ref Range Status   04/22/2022 185 140 - 450 10*3/mm3 Final   02/26/2019 153 150 - 450 10*3/uL Final      Chemistry   Lab Results   Component Value Date    GLUCOSE 120 04/22/2022    BUN 24 (H)  04/22/2022    CREATININE 1.03 04/22/2022    BCR 23.3 04/22/2022    K 3.9 04/22/2022    CO2 28.7 04/22/2022    CALCIUM 9.7 04/22/2022    ALBUMIN 3.80 04/22/2022    LABIL2 1.2 12/09/2021    AST 20 04/22/2022    ALT 24 04/22/2022         Physical Exam:           Neck: [] Lymphadenopathy  Lungs: [x] Clear to auscultation  CVS: [x] Regular rate & rhythm  Skin: [x] stGstrstastdstest:st st1st Comments/Notes:     [x] Review of labs, images, dosimetry, dose delivered, & treatment parameters.    Comments:     [x] Patient treatment setup reviewed.    Comments:     Recommendations: skin care and precautions discussed  Resolved cellulitis  High riding implant and coverage of dissected axilla discussed with dosimetry    [x] Continue RT  [] Change RT Plan [] Hold RT, length:        Approved Electronically By:  Ron Cook MD, 6/14/2022, 08:39 EDT          Confidentiality of this medical record shall be maintained except when use or disclosure is required or permitted by law, regulation or written authorization by the patient.

## 2022-06-13 NOTE — PROGRESS NOTES
RADIATION THERAPY SKIN CARE INSTRUCTIONS  Breast Cancer    First Radiation Treatment: 06/13/2022    Mary Ann Michaels was provided verbal and written education regarding proper skin care during radiation therapy treatments that included:    DURING TREATMENTS  • Use a mild, unscented soap to cleanse skin every day  • Use unscented lotions and creams on treated skin  • Wear loose clothing made of cotton or other soft fabric   • Avoid wearing bras that may rub against the treatment area  • Do not use cosmetic products on your treated skin  • Do not use cornstarch or baby powder under your breast or underarm  • Do not apply adhesive tape on your treatment area  • Avoid using anything hot or cold on your treatment area  • Do not shave the hair under the arm of the breast being treated  • Avoid chlorine pools or hot tubs, if possible    AFTER TREATMENTS  • Continue applying your unscented lotion up to four times daily until your first follow-up appointment with your radiation doctor  • Do not use any products on the treated area of skin that has not been discussed with the doctor    She was given a skin care kit (8 oz spray bottle, 16 green tea bags, written instructions for preparing and using green tea spray, Eucerin sample, Aquaphor/Eucerin coupons, and Dove Sensitive Skin bar soap). She was also educated on preparation and use of green tea spray:   • After first radiation treatment, start using a strong green tea solution to breast and chest wall to help reduce inflammation caused by the radiation  • To make the spray, steep two caffeinated green tea bags in one cup of boiling water for at least one hour. Pour the cooled tea into a small spray bottle and spray treated skin up to four times a day. Allow the tea to dry or gently blot dry with a soft towel. Make a new green tea spray every two days.  • Stop using the green tea spray once your therapy is complete, if desired    She had opportunity to ask questions and  engage in the skin care discussion. All questions were answered to satisfaction and she verbalized understanding of instructions. She was encouraged to reach out at any time during radiation therapy treatments with any questions or concerns regarding skin care and/or skin reactions.    Alyx Chase RN, BSN  Radiation Oncology Department  [facility]  [phone]  Office  [phone]  Fax

## 2022-06-14 ENCOUNTER — HOSPITAL ENCOUNTER (OUTPATIENT)
Dept: RADIATION ONCOLOGY | Facility: HOSPITAL | Age: 67
Discharge: HOME | End: 2022-06-14

## 2022-06-14 ENCOUNTER — RADIATION ONCOLOGY WEEKLY ASSESSMENT (OUTPATIENT)
Dept: RADIATION ONCOLOGY | Facility: HOSPITAL | Age: 67
End: 2022-06-14

## 2022-06-14 VITALS
BODY MASS INDEX: 45.88 KG/M2 | SYSTOLIC BLOOD PRESSURE: 147 MMHG | OXYGEN SATURATION: 96 % | WEIGHT: 271.4 LBS | RESPIRATION RATE: 24 BRPM | HEART RATE: 59 BPM | DIASTOLIC BLOOD PRESSURE: 83 MMHG

## 2022-06-14 DIAGNOSIS — C50.412 MALIGNANT NEOPLASM OF UPPER-OUTER QUADRANT OF LEFT BREAST IN FEMALE, ESTROGEN RECEPTOR POSITIVE: Primary | ICD-10-CM

## 2022-06-14 DIAGNOSIS — Z17.0 MALIGNANT NEOPLASM OF UPPER-OUTER QUADRANT OF LEFT BREAST IN FEMALE, ESTROGEN RECEPTOR POSITIVE: Primary | ICD-10-CM

## 2022-06-14 LAB
RAD ONC ARIA COURSE ID: NORMAL
RAD ONC ARIA COURSE LAST TREATMENT DATE: NORMAL
RAD ONC ARIA COURSE START DATE: NORMAL
RAD ONC ARIA COURSE TREATMENT ELAPSED DAYS: 1
RAD ONC ARIA FIRST TREATMENT DATE: NORMAL
RAD ONC ARIA PLAN FRACTIONS TREATED TO DATE: 2
RAD ONC ARIA PLAN ID: NORMAL
RAD ONC ARIA PLAN PRESCRIBED DOSE PER FRACTION: 2 GY
RAD ONC ARIA PLAN PRIMARY REFERENCE POINT: NORMAL
RAD ONC ARIA PLAN TOTAL FRACTIONS PRESCRIBED: 25
RAD ONC ARIA PLAN TOTAL PRESCRIBED DOSE: 5000 CGY
RAD ONC ARIA REFERENCE POINT DOSAGE GIVEN TO DATE: 4 GY
RAD ONC ARIA REFERENCE POINT ID: NORMAL
RAD ONC ARIA REFERENCE POINT SESSION DOSAGE GIVEN: 2 GY

## 2022-06-14 PROCEDURE — 77385: CPT | Performed by: RADIOLOGY

## 2022-06-14 PROCEDURE — G6002 STEREOSCOPIC X-RAY GUIDANCE: HCPCS | Performed by: RADIOLOGY

## 2022-06-14 PROCEDURE — FACE2FACE: Performed by: RADIOLOGY

## 2022-06-15 ENCOUNTER — HOSPITAL ENCOUNTER (OUTPATIENT)
Dept: RADIATION ONCOLOGY | Facility: HOSPITAL | Age: 67
Discharge: HOME | End: 2022-06-15

## 2022-06-15 LAB
RAD ONC ARIA COURSE ID: NORMAL
RAD ONC ARIA COURSE LAST TREATMENT DATE: NORMAL
RAD ONC ARIA COURSE START DATE: NORMAL
RAD ONC ARIA COURSE TREATMENT ELAPSED DAYS: 2
RAD ONC ARIA FIRST TREATMENT DATE: NORMAL
RAD ONC ARIA PLAN FRACTIONS TREATED TO DATE: 1
RAD ONC ARIA PLAN ID: NORMAL
RAD ONC ARIA PLAN PRESCRIBED DOSE PER FRACTION: 2 GY
RAD ONC ARIA PLAN PRIMARY REFERENCE POINT: NORMAL
RAD ONC ARIA PLAN TOTAL FRACTIONS PRESCRIBED: 23
RAD ONC ARIA PLAN TOTAL PRESCRIBED DOSE: 4600 CGY
RAD ONC ARIA REFERENCE POINT DOSAGE GIVEN TO DATE: 6 GY
RAD ONC ARIA REFERENCE POINT ID: NORMAL
RAD ONC ARIA REFERENCE POINT SESSION DOSAGE GIVEN: 2 GY

## 2022-06-15 PROCEDURE — 77385: CPT | Performed by: RADIOLOGY

## 2022-06-15 PROCEDURE — G6002 STEREOSCOPIC X-RAY GUIDANCE: HCPCS | Performed by: RADIOLOGY

## 2022-06-16 ENCOUNTER — HOSPITAL ENCOUNTER (OUTPATIENT)
Dept: RADIATION ONCOLOGY | Facility: HOSPITAL | Age: 67
Discharge: HOME | End: 2022-06-16

## 2022-06-16 LAB
RAD ONC ARIA COURSE ID: NORMAL
RAD ONC ARIA COURSE LAST TREATMENT DATE: NORMAL
RAD ONC ARIA COURSE START DATE: NORMAL
RAD ONC ARIA COURSE TREATMENT ELAPSED DAYS: 3
RAD ONC ARIA FIRST TREATMENT DATE: NORMAL
RAD ONC ARIA PLAN FRACTIONS TREATED TO DATE: 2
RAD ONC ARIA PLAN ID: NORMAL
RAD ONC ARIA PLAN PRESCRIBED DOSE PER FRACTION: 2 GY
RAD ONC ARIA PLAN PRIMARY REFERENCE POINT: NORMAL
RAD ONC ARIA PLAN TOTAL FRACTIONS PRESCRIBED: 23
RAD ONC ARIA PLAN TOTAL PRESCRIBED DOSE: 4600 CGY
RAD ONC ARIA REFERENCE POINT DOSAGE GIVEN TO DATE: 8 GY
RAD ONC ARIA REFERENCE POINT ID: NORMAL
RAD ONC ARIA REFERENCE POINT SESSION DOSAGE GIVEN: 2 GY

## 2022-06-16 PROCEDURE — 77336 RADIATION PHYSICS CONSULT: CPT | Performed by: RADIOLOGY

## 2022-06-16 PROCEDURE — 77385: CPT | Performed by: RADIOLOGY

## 2022-06-16 PROCEDURE — G6002 STEREOSCOPIC X-RAY GUIDANCE: HCPCS | Performed by: RADIOLOGY

## 2022-06-17 ENCOUNTER — HOSPITAL ENCOUNTER (OUTPATIENT)
Dept: RADIATION ONCOLOGY | Facility: HOSPITAL | Age: 67
Discharge: HOME | End: 2022-06-17

## 2022-06-17 LAB
RAD ONC ARIA COURSE ID: NORMAL
RAD ONC ARIA COURSE LAST TREATMENT DATE: NORMAL
RAD ONC ARIA COURSE START DATE: NORMAL
RAD ONC ARIA COURSE TREATMENT ELAPSED DAYS: 4
RAD ONC ARIA FIRST TREATMENT DATE: NORMAL
RAD ONC ARIA PLAN FRACTIONS TREATED TO DATE: 3
RAD ONC ARIA PLAN ID: NORMAL
RAD ONC ARIA PLAN PRESCRIBED DOSE PER FRACTION: 2 GY
RAD ONC ARIA PLAN PRIMARY REFERENCE POINT: NORMAL
RAD ONC ARIA PLAN TOTAL FRACTIONS PRESCRIBED: 23
RAD ONC ARIA PLAN TOTAL PRESCRIBED DOSE: 4600 CGY
RAD ONC ARIA REFERENCE POINT DOSAGE GIVEN TO DATE: 10 GY
RAD ONC ARIA REFERENCE POINT ID: NORMAL
RAD ONC ARIA REFERENCE POINT SESSION DOSAGE GIVEN: 2 GY

## 2022-06-17 PROCEDURE — 77385: CPT | Performed by: RADIOLOGY

## 2022-06-17 PROCEDURE — G6002 STEREOSCOPIC X-RAY GUIDANCE: HCPCS | Performed by: RADIOLOGY

## 2022-06-20 ENCOUNTER — HOSPITAL ENCOUNTER (OUTPATIENT)
Dept: RADIATION ONCOLOGY | Facility: HOSPITAL | Age: 67
Discharge: HOME | End: 2022-06-20

## 2022-06-20 ENCOUNTER — RADIATION ONCOLOGY WEEKLY ASSESSMENT (OUTPATIENT)
Dept: RADIATION ONCOLOGY | Facility: HOSPITAL | Age: 67
End: 2022-06-20

## 2022-06-20 VITALS — BODY MASS INDEX: 45.65 KG/M2 | WEIGHT: 270 LBS

## 2022-06-20 DIAGNOSIS — C50.412 MALIGNANT NEOPLASM OF UPPER-OUTER QUADRANT OF LEFT BREAST IN FEMALE, ESTROGEN RECEPTOR POSITIVE: Primary | ICD-10-CM

## 2022-06-20 DIAGNOSIS — Z17.0 MALIGNANT NEOPLASM OF UPPER-OUTER QUADRANT OF LEFT BREAST IN FEMALE, ESTROGEN RECEPTOR POSITIVE: Primary | ICD-10-CM

## 2022-06-20 LAB
RAD ONC ARIA COURSE ID: NORMAL
RAD ONC ARIA COURSE LAST TREATMENT DATE: NORMAL
RAD ONC ARIA COURSE START DATE: NORMAL
RAD ONC ARIA COURSE TREATMENT ELAPSED DAYS: 7
RAD ONC ARIA FIRST TREATMENT DATE: NORMAL
RAD ONC ARIA PLAN FRACTIONS TREATED TO DATE: 4
RAD ONC ARIA PLAN ID: NORMAL
RAD ONC ARIA PLAN PRESCRIBED DOSE PER FRACTION: 2 GY
RAD ONC ARIA PLAN PRIMARY REFERENCE POINT: NORMAL
RAD ONC ARIA PLAN TOTAL FRACTIONS PRESCRIBED: 23
RAD ONC ARIA PLAN TOTAL PRESCRIBED DOSE: 4600 CGY
RAD ONC ARIA REFERENCE POINT DOSAGE GIVEN TO DATE: 12 GY
RAD ONC ARIA REFERENCE POINT ID: NORMAL
RAD ONC ARIA REFERENCE POINT SESSION DOSAGE GIVEN: 2 GY

## 2022-06-20 PROCEDURE — G6002 STEREOSCOPIC X-RAY GUIDANCE: HCPCS | Performed by: RADIOLOGY

## 2022-06-20 PROCEDURE — 77385: CPT | Performed by: RADIOLOGY

## 2022-06-20 PROCEDURE — FACE2FACE: Performed by: RADIOLOGY

## 2022-06-20 NOTE — PROGRESS NOTES
Patient Name: Mary Ann Michaels Date: 2022       : 1955        MRN #: [id number] Diagnosis:   Encounter Diagnosis   Name Primary?   • Malignant neoplasm of upper-outer quadrant of left breast in female, estrogen receptor positive (HCC) Yes                     RADIATION ON TREATMENT VISIT NOTE - BREAST    Treatment Summary        General:           Review of Systems    [] No new complaints  [] Skin itching   [] Skin breakdown  [] Breast swelling   [] Dysphagia   [] Dry cough  [] Productive cough   [] Fever/chills  [x] Skin soreness with pain, severity: 2   [x] Breast pain,  severity: 2, location: left chest wall  [] Fatigue,  severity: ----------------      Skin regimen: Other:  Eucerin QID, Green Tea Spray QID    [x] Patient given standard skin care instructions for breast cancer treatment including no aluminum containing antiperspirants & no unapproved lotions or soaps.  Patient informed to notify radiation staff if irritation/itching occurs.    Comments/Notes:  States her skin burns at times but finds relief with green tea spray. Using Eucerin at least 4 times daily.    KPS: 90%        Vital Signs: Wt 122 kg (270 lb)   LMP  (LMP Unknown)   BMI 45.65 kg/m²     Weight:   Wt Readings from Last 3 Encounters:   22 122 kg (270 lb)   22 123 kg (271 lb 6.4 oz)   22 125 kg (276 lb 9.6 oz)       Medication:   Current Outpatient Medications:   •  acetaminophen (TYLENOL) 325 MG tablet, Take 2 tablets by mouth Every 4 (Four) Hours As Needed for Mild Pain ., Disp: 60 tablet, Rfl: 0  •  anastrozole (Arimidex) 1 MG tablet, Take 1 tablet by mouth Daily., Disp: 30 tablet, Rfl: 3  •  citalopram (CeleXA) 40 MG tablet, Take 1 tablet by mouth daily. (Patient taking differently: Take 40 mg by mouth Every Night.), Disp: 30 tablet, Rfl: 5  •  docusate sodium (COLACE) 250 MG capsule, Take 1 capsule by mouth 2 (Two) Times a Day As Needed for Constipation., Disp: 60 capsule, Rfl: 1  •  HYDROcodone-acetaminophen  (NORCO) 5-325 MG per tablet, Take 1-2 tablets by mouth Every 4 (Four) Hours As Needed (Pain)., Disp: 20 tablet, Rfl: 0  •  ondansetron ODT (Zofran ODT) 8 MG disintegrating tablet, Place 1 tablet on the tongue Every 8 (Eight) Hours As Needed for Nausea or Vomiting., Disp: 10 tablet, Rfl: 1  •  propranolol LA (INDERAL LA) 160 MG 24 hr capsule, Take 1 capsule by mouth Every Night., Disp: 90 capsule, Rfl: 3  •  sennosides-docusate (PERICOLACE) 8.6-50 MG per tablet, Take 2 tablets by mouth Daily As Needed for Constipation., Disp: 30 tablet, Rfl: 1  •  spironolactone (ALDACTONE) 25 MG tablet, Take 1 tablet by mouth daily., Disp: 90 tablet, Rfl: 1  •  sulfamethoxazole-trimethoprim (Bactrim DS) 800-160 MG per tablet, Take 1 tablet every 12 hours by oral route for 10 days., Disp: 20 tablet, Rfl: 0  •  zolpidem (AMBIEN) 5 MG tablet, Take 5 mg by mouth At Night As Needed., Disp: , Rfl:   •  zolpidem (AMBIEN) 5 MG tablet, Take 1 tablet by mouth nightly as needed for Sleep, Disp: 30 tablet, Rfl: 2       LABS (Reviewed):  Hematology WBC   Date Value Ref Range Status   04/22/2022 7.34 3.40 - 10.80 10*3/mm3 Final   02/26/2019 5.8 4.5 - 11.0 10*3/uL Final     RBC   Date Value Ref Range Status   04/22/2022 4.64 3.77 - 5.28 10*6/mm3 Final   02/26/2019 4.68 4.0 - 6.0 10*6/uL Final     Hemoglobin   Date Value Ref Range Status   04/22/2022 14.2 12.0 - 15.9 g/dL Final   02/26/2019 14.6 12.0 - 16.0 g/dL Final     Hematocrit   Date Value Ref Range Status   04/22/2022 42.7 34.0 - 46.6 % Final   02/26/2019 42.2 33.0 - 51.0 % Final     Platelets   Date Value Ref Range Status   04/22/2022 185 140 - 450 10*3/mm3 Final   02/26/2019 153 150 - 450 10*3/uL Final      Chemistry   Lab Results   Component Value Date    GLUCOSE 120 04/22/2022    BUN 24 (H) 04/22/2022    CREATININE 1.03 04/22/2022    BCR 23.3 04/22/2022    K 3.9 04/22/2022    CO2 28.7 04/22/2022    CALCIUM 9.7 04/22/2022    ALBUMIN 3.80 04/22/2022    LABIL2 1.2 12/09/2021    AST 20  04/22/2022    ALT 24 04/22/2022         Physical Exam:       A&Ox3, NAD  Left chest wall with grade 1 erythema, no desquamation      Comments/Notes:     [x] Review of labs, images, dosimetry, dose delivered, & treatment parameters.    Comments:     [x] Patient treatment setup reviewed.    Comments:     Recommendations:     [x] Continue RT  [] Change RT Plan [] Hold RT, length:        Approved Electronically By:  Kyle Pugh MD, 6/20/2022, 08:43 EDT          Confidentiality of this medical record shall be maintained except when use or disclosure is required or permitted by law, regulation or written authorization by the patient.

## 2022-06-21 ENCOUNTER — HOSPITAL ENCOUNTER (OUTPATIENT)
Dept: RADIATION ONCOLOGY | Facility: HOSPITAL | Age: 67
Discharge: HOME | End: 2022-06-21

## 2022-06-21 LAB
RAD ONC ARIA COURSE ID: NORMAL
RAD ONC ARIA COURSE LAST TREATMENT DATE: NORMAL
RAD ONC ARIA COURSE START DATE: NORMAL
RAD ONC ARIA COURSE TREATMENT ELAPSED DAYS: 8
RAD ONC ARIA FIRST TREATMENT DATE: NORMAL
RAD ONC ARIA PLAN FRACTIONS TREATED TO DATE: 5
RAD ONC ARIA PLAN ID: NORMAL
RAD ONC ARIA PLAN PRESCRIBED DOSE PER FRACTION: 2 GY
RAD ONC ARIA PLAN PRIMARY REFERENCE POINT: NORMAL
RAD ONC ARIA PLAN TOTAL FRACTIONS PRESCRIBED: 23
RAD ONC ARIA PLAN TOTAL PRESCRIBED DOSE: 4600 CGY
RAD ONC ARIA REFERENCE POINT DOSAGE GIVEN TO DATE: 14 GY
RAD ONC ARIA REFERENCE POINT ID: NORMAL
RAD ONC ARIA REFERENCE POINT SESSION DOSAGE GIVEN: 2 GY

## 2022-06-21 PROCEDURE — 77427 RADIATION TX MANAGEMENT X5: CPT | Performed by: RADIOLOGY

## 2022-06-21 PROCEDURE — 77385: CPT | Performed by: RADIOLOGY

## 2022-06-21 PROCEDURE — G6002 STEREOSCOPIC X-RAY GUIDANCE: HCPCS | Performed by: RADIOLOGY

## 2022-06-22 ENCOUNTER — HOSPITAL ENCOUNTER (OUTPATIENT)
Dept: RADIATION ONCOLOGY | Facility: HOSPITAL | Age: 67
Discharge: HOME | End: 2022-06-22

## 2022-06-22 LAB
RAD ONC ARIA COURSE ID: NORMAL
RAD ONC ARIA COURSE LAST TREATMENT DATE: NORMAL
RAD ONC ARIA COURSE START DATE: NORMAL
RAD ONC ARIA COURSE TREATMENT ELAPSED DAYS: 9
RAD ONC ARIA FIRST TREATMENT DATE: NORMAL
RAD ONC ARIA PLAN FRACTIONS TREATED TO DATE: 6
RAD ONC ARIA PLAN ID: NORMAL
RAD ONC ARIA PLAN PRESCRIBED DOSE PER FRACTION: 2 GY
RAD ONC ARIA PLAN PRIMARY REFERENCE POINT: NORMAL
RAD ONC ARIA PLAN TOTAL FRACTIONS PRESCRIBED: 23
RAD ONC ARIA PLAN TOTAL PRESCRIBED DOSE: 4600 CGY
RAD ONC ARIA REFERENCE POINT DOSAGE GIVEN TO DATE: 16 GY
RAD ONC ARIA REFERENCE POINT ID: NORMAL
RAD ONC ARIA REFERENCE POINT SESSION DOSAGE GIVEN: 2 GY

## 2022-06-22 PROCEDURE — 77385: CPT | Performed by: RADIOLOGY

## 2022-06-22 PROCEDURE — G6002 STEREOSCOPIC X-RAY GUIDANCE: HCPCS | Performed by: RADIOLOGY

## 2022-06-23 ENCOUNTER — HOSPITAL ENCOUNTER (OUTPATIENT)
Dept: RADIATION ONCOLOGY | Facility: HOSPITAL | Age: 67
Discharge: HOME | End: 2022-06-23

## 2022-06-23 LAB
RAD ONC ARIA COURSE ID: NORMAL
RAD ONC ARIA COURSE LAST TREATMENT DATE: NORMAL
RAD ONC ARIA COURSE START DATE: NORMAL
RAD ONC ARIA COURSE TREATMENT ELAPSED DAYS: 10
RAD ONC ARIA FIRST TREATMENT DATE: NORMAL
RAD ONC ARIA PLAN FRACTIONS TREATED TO DATE: 7
RAD ONC ARIA PLAN ID: NORMAL
RAD ONC ARIA PLAN PRESCRIBED DOSE PER FRACTION: 2 GY
RAD ONC ARIA PLAN PRIMARY REFERENCE POINT: NORMAL
RAD ONC ARIA PLAN TOTAL FRACTIONS PRESCRIBED: 23
RAD ONC ARIA PLAN TOTAL PRESCRIBED DOSE: 4600 CGY
RAD ONC ARIA REFERENCE POINT DOSAGE GIVEN TO DATE: 18 GY
RAD ONC ARIA REFERENCE POINT ID: NORMAL
RAD ONC ARIA REFERENCE POINT SESSION DOSAGE GIVEN: 2 GY

## 2022-06-23 PROCEDURE — 77336 RADIATION PHYSICS CONSULT: CPT | Performed by: RADIOLOGY

## 2022-06-23 PROCEDURE — G6002 STEREOSCOPIC X-RAY GUIDANCE: HCPCS | Performed by: RADIOLOGY

## 2022-06-23 PROCEDURE — 77385: CPT | Performed by: RADIOLOGY

## 2022-06-24 ENCOUNTER — HOSPITAL ENCOUNTER (OUTPATIENT)
Dept: RADIATION ONCOLOGY | Facility: HOSPITAL | Age: 67
Discharge: HOME | End: 2022-06-24

## 2022-06-24 LAB
RAD ONC ARIA COURSE ID: NORMAL
RAD ONC ARIA COURSE LAST TREATMENT DATE: NORMAL
RAD ONC ARIA COURSE START DATE: NORMAL
RAD ONC ARIA COURSE TREATMENT ELAPSED DAYS: 11
RAD ONC ARIA FIRST TREATMENT DATE: NORMAL
RAD ONC ARIA PLAN FRACTIONS TREATED TO DATE: 8
RAD ONC ARIA PLAN ID: NORMAL
RAD ONC ARIA PLAN PRESCRIBED DOSE PER FRACTION: 2 GY
RAD ONC ARIA PLAN PRIMARY REFERENCE POINT: NORMAL
RAD ONC ARIA PLAN TOTAL FRACTIONS PRESCRIBED: 23
RAD ONC ARIA PLAN TOTAL PRESCRIBED DOSE: 4600 CGY
RAD ONC ARIA REFERENCE POINT DOSAGE GIVEN TO DATE: 20 GY
RAD ONC ARIA REFERENCE POINT ID: NORMAL
RAD ONC ARIA REFERENCE POINT SESSION DOSAGE GIVEN: 2 GY

## 2022-06-24 PROCEDURE — G6002 STEREOSCOPIC X-RAY GUIDANCE: HCPCS | Performed by: RADIOLOGY

## 2022-06-24 PROCEDURE — 77385: CPT | Performed by: RADIOLOGY

## 2022-06-27 ENCOUNTER — HOSPITAL ENCOUNTER (OUTPATIENT)
Dept: RADIATION ONCOLOGY | Facility: HOSPITAL | Age: 67
Discharge: HOME | End: 2022-06-27

## 2022-06-27 LAB
RAD ONC ARIA COURSE ID: NORMAL
RAD ONC ARIA COURSE LAST TREATMENT DATE: NORMAL
RAD ONC ARIA COURSE START DATE: NORMAL
RAD ONC ARIA COURSE TREATMENT ELAPSED DAYS: 14
RAD ONC ARIA FIRST TREATMENT DATE: NORMAL
RAD ONC ARIA PLAN FRACTIONS TREATED TO DATE: 9
RAD ONC ARIA PLAN ID: NORMAL
RAD ONC ARIA PLAN PRESCRIBED DOSE PER FRACTION: 2 GY
RAD ONC ARIA PLAN PRIMARY REFERENCE POINT: NORMAL
RAD ONC ARIA PLAN TOTAL FRACTIONS PRESCRIBED: 23
RAD ONC ARIA PLAN TOTAL PRESCRIBED DOSE: 4600 CGY
RAD ONC ARIA REFERENCE POINT DOSAGE GIVEN TO DATE: 22 GY
RAD ONC ARIA REFERENCE POINT ID: NORMAL
RAD ONC ARIA REFERENCE POINT SESSION DOSAGE GIVEN: 2 GY

## 2022-06-27 PROCEDURE — 77385: CPT | Performed by: RADIOLOGY

## 2022-06-27 PROCEDURE — G6002 STEREOSCOPIC X-RAY GUIDANCE: HCPCS | Performed by: RADIOLOGY

## 2022-06-27 PROCEDURE — FACE2FACE: Performed by: RADIOLOGY

## 2022-06-28 ENCOUNTER — HOSPITAL ENCOUNTER (OUTPATIENT)
Dept: RADIATION ONCOLOGY | Facility: HOSPITAL | Age: 67
Discharge: HOME | End: 2022-06-28

## 2022-06-28 ENCOUNTER — RADIATION ONCOLOGY WEEKLY ASSESSMENT (OUTPATIENT)
Dept: RADIATION ONCOLOGY | Facility: HOSPITAL | Age: 67
End: 2022-06-28

## 2022-06-28 VITALS
BODY MASS INDEX: 45.82 KG/M2 | OXYGEN SATURATION: 96 % | HEART RATE: 59 BPM | SYSTOLIC BLOOD PRESSURE: 144 MMHG | DIASTOLIC BLOOD PRESSURE: 85 MMHG | TEMPERATURE: 96.8 F | WEIGHT: 268.4 LBS | RESPIRATION RATE: 22 BRPM | HEIGHT: 64 IN

## 2022-06-28 DIAGNOSIS — C50.412 MALIGNANT NEOPLASM OF UPPER-OUTER QUADRANT OF LEFT BREAST IN FEMALE, ESTROGEN RECEPTOR POSITIVE: Primary | ICD-10-CM

## 2022-06-28 DIAGNOSIS — Z17.0 MALIGNANT NEOPLASM OF UPPER-OUTER QUADRANT OF LEFT BREAST IN FEMALE, ESTROGEN RECEPTOR POSITIVE: Primary | ICD-10-CM

## 2022-06-28 LAB
RAD ONC ARIA COURSE ID: NORMAL
RAD ONC ARIA COURSE LAST TREATMENT DATE: NORMAL
RAD ONC ARIA COURSE START DATE: NORMAL
RAD ONC ARIA COURSE TREATMENT ELAPSED DAYS: 15
RAD ONC ARIA FIRST TREATMENT DATE: NORMAL
RAD ONC ARIA PLAN FRACTIONS TREATED TO DATE: 10
RAD ONC ARIA PLAN ID: NORMAL
RAD ONC ARIA PLAN PRESCRIBED DOSE PER FRACTION: 2 GY
RAD ONC ARIA PLAN PRIMARY REFERENCE POINT: NORMAL
RAD ONC ARIA PLAN TOTAL FRACTIONS PRESCRIBED: 23
RAD ONC ARIA PLAN TOTAL PRESCRIBED DOSE: 4600 CGY
RAD ONC ARIA REFERENCE POINT DOSAGE GIVEN TO DATE: 24 GY
RAD ONC ARIA REFERENCE POINT ID: NORMAL
RAD ONC ARIA REFERENCE POINT SESSION DOSAGE GIVEN: 2 GY

## 2022-06-28 PROCEDURE — 77336 RADIATION PHYSICS CONSULT: CPT | Performed by: RADIOLOGY

## 2022-06-28 PROCEDURE — G6002 STEREOSCOPIC X-RAY GUIDANCE: HCPCS | Performed by: RADIOLOGY

## 2022-06-28 PROCEDURE — 77427 RADIATION TX MANAGEMENT X5: CPT | Performed by: RADIOLOGY

## 2022-06-28 PROCEDURE — 77385: CPT | Performed by: RADIOLOGY

## 2022-06-28 RX ORDER — MOMETASONE FUROATE 1 MG/G
CREAM TOPICAL
Qty: 45 G | Refills: 0 | Status: SHIPPED | OUTPATIENT
Start: 2022-06-28 | End: 2023-02-02 | Stop reason: HOSPADM

## 2022-06-28 NOTE — PROGRESS NOTES
"Patient Name: Mary Ann Michaels Date: 2022       : 1955        MRN #: [id number] Diagnosis:   Encounter Diagnosis   Name Primary?   • Malignant neoplasm of upper-outer quadrant of left breast in female, estrogen receptor positive (HCC) Yes                     RADIATION ON TREATMENT VISIT NOTE - BREAST    Treatment Summary    DIBH, Left Chestwall   fractions delivered to date  2400 cGy of a prescribed 5000 cGy       General:           Review of Systems    [] No new complaints   [x] Skin itching   [] Skin breakdown  [x] Breast swelling   [] Dysphagia   [] Dry cough  [] Productive cough   [] Fever/chills  [] Skin soreness with pain, severity: ----------------   [x] Breast pain,  severity: 2, location: breast  [x] Fatigue,  severity: 1 Relief w/ Rest      Skin regimen: Other: Eucerin TID; Green Tea Spray BID    [x] Patient given standard skin care instructions for breast cancer treatment including no aluminum containing antiperspirants & no unapproved lotions or soaps.  Patient informed to notify radiation staff if irritation/itching occurs.    Comments/Notes:   Patient is doing well with treatments with so far, she stated that she is having some swelling around the top of her expander, and it's starting to itch quite a bit. She also mentioned that her chest right above her left breast almost feels like a sunburn, and it stings some.    KPS: 90%       Vital Signs: /85   Pulse 59   Temp 96.8 °F (36 °C) (Temporal)   Resp 22   Ht 162.6 cm (64\")   Wt 122 kg (268 lb 6.4 oz)   LMP  (LMP Unknown)   SpO2 96%   BMI 46.07 kg/m²     Weight:   Wt Readings from Last 3 Encounters:   22 122 kg (268 lb 6.4 oz)   22 122 kg (270 lb)   22 123 kg (271 lb 6.4 oz)       Medication:   Current Outpatient Medications:   •  acetaminophen (TYLENOL) 325 MG tablet, Take 2 tablets by mouth Every 4 (Four) Hours As Needed for Mild Pain ., Disp: 60 tablet, Rfl: 0  •  anastrozole (Arimidex) 1 MG tablet, Take " 1 tablet by mouth Daily., Disp: 30 tablet, Rfl: 3  •  citalopram (CeleXA) 40 MG tablet, Take 1 tablet by mouth daily. (Patient taking differently: Take 40 mg by mouth Every Night.), Disp: 30 tablet, Rfl: 5  •  docusate sodium (COLACE) 250 MG capsule, Take 1 capsule by mouth 2 (Two) Times a Day As Needed for Constipation., Disp: 60 capsule, Rfl: 1  •  HYDROcodone-acetaminophen (NORCO) 5-325 MG per tablet, Take 1-2 tablets by mouth Every 4 (Four) Hours As Needed (Pain)., Disp: 20 tablet, Rfl: 0  •  ondansetron ODT (Zofran ODT) 8 MG disintegrating tablet, Place 1 tablet on the tongue Every 8 (Eight) Hours As Needed for Nausea or Vomiting., Disp: 10 tablet, Rfl: 1  •  propranolol LA (INDERAL LA) 160 MG 24 hr capsule, Take 1 capsule by mouth Every Night., Disp: 90 capsule, Rfl: 3  •  sennosides-docusate (PERICOLACE) 8.6-50 MG per tablet, Take 2 tablets by mouth Daily As Needed for Constipation., Disp: 30 tablet, Rfl: 1  •  spironolactone (ALDACTONE) 25 MG tablet, Take 1 tablet by mouth daily., Disp: 90 tablet, Rfl: 1  •  sulfamethoxazole-trimethoprim (Bactrim DS) 800-160 MG per tablet, Take 1 tablet every 12 hours by oral route for 10 days., Disp: 20 tablet, Rfl: 0  •  zolpidem (AMBIEN) 5 MG tablet, Take 5 mg by mouth At Night As Needed., Disp: , Rfl:   •  zolpidem (AMBIEN) 5 MG tablet, Take 1 tablet by mouth nightly as needed for Sleep, Disp: 30 tablet, Rfl: 2       LABS (Reviewed):  Hematology WBC   Date Value Ref Range Status   04/22/2022 7.34 3.40 - 10.80 10*3/mm3 Final   02/26/2019 5.8 4.5 - 11.0 10*3/uL Final     RBC   Date Value Ref Range Status   04/22/2022 4.64 3.77 - 5.28 10*6/mm3 Final   02/26/2019 4.68 4.0 - 6.0 10*6/uL Final     Hemoglobin   Date Value Ref Range Status   04/22/2022 14.2 12.0 - 15.9 g/dL Final   02/26/2019 14.6 12.0 - 16.0 g/dL Final     Hematocrit   Date Value Ref Range Status   04/22/2022 42.7 34.0 - 46.6 % Final   02/26/2019 42.2 33.0 - 51.0 % Final     Platelets   Date Value Ref Range  Status   04/22/2022 185 140 - 450 10*3/mm3 Final   02/26/2019 153 150 - 450 10*3/uL Final      Chemistry   Lab Results   Component Value Date    GLUCOSE 120 04/22/2022    BUN 24 (H) 04/22/2022    CREATININE 1.03 04/22/2022    BCR 23.3 04/22/2022    K 3.9 04/22/2022    CO2 28.7 04/22/2022    CALCIUM 9.7 04/22/2022    ALBUMIN 3.80 04/22/2022    LABIL2 1.2 12/09/2021    AST 20 04/22/2022    ALT 24 04/22/2022         Physical Exam:           Neck: [] Lymphadenopathy  Lungs: [x] Clear to auscultation  CVS: [x] Regular rate & rhythm  Skin: [x] ndGndrndanddndend:nd nd2nd Comments/Notes:     [x] Review of labs, images, dosimetry, dose delivered, & treatment parameters.    Comments:     [x] Patient treatment setup reviewed.    Comments:     Recommendations: Rx for mometasone    [x] Continue RT  [] Change RT Plan [] Hold RT, length:        Approved Electronically By:  Ron Cook MD, 6/28/2022, 09:05 EDT          Confidentiality of this medical record shall be maintained except when use or disclosure is required or permitted by law, regulation or written authorization by the patient.

## 2022-06-29 ENCOUNTER — HOSPITAL ENCOUNTER (OUTPATIENT)
Dept: RADIATION ONCOLOGY | Facility: HOSPITAL | Age: 67
Discharge: HOME | End: 2022-06-29

## 2022-06-29 LAB
RAD ONC ARIA COURSE ID: NORMAL
RAD ONC ARIA COURSE LAST TREATMENT DATE: NORMAL
RAD ONC ARIA COURSE START DATE: NORMAL
RAD ONC ARIA COURSE TREATMENT ELAPSED DAYS: 16
RAD ONC ARIA FIRST TREATMENT DATE: NORMAL
RAD ONC ARIA PLAN FRACTIONS TREATED TO DATE: 11
RAD ONC ARIA PLAN ID: NORMAL
RAD ONC ARIA PLAN PRESCRIBED DOSE PER FRACTION: 2 GY
RAD ONC ARIA PLAN PRIMARY REFERENCE POINT: NORMAL
RAD ONC ARIA PLAN TOTAL FRACTIONS PRESCRIBED: 23
RAD ONC ARIA PLAN TOTAL PRESCRIBED DOSE: 4600 CGY
RAD ONC ARIA REFERENCE POINT DOSAGE GIVEN TO DATE: 26 GY
RAD ONC ARIA REFERENCE POINT ID: NORMAL
RAD ONC ARIA REFERENCE POINT SESSION DOSAGE GIVEN: 2 GY

## 2022-06-29 PROCEDURE — 77385: CPT | Performed by: RADIOLOGY

## 2022-06-29 PROCEDURE — G6002 STEREOSCOPIC X-RAY GUIDANCE: HCPCS | Performed by: RADIOLOGY

## 2022-06-30 ENCOUNTER — HOSPITAL ENCOUNTER (OUTPATIENT)
Dept: RADIATION ONCOLOGY | Facility: HOSPITAL | Age: 67
Discharge: HOME | End: 2022-06-30

## 2022-06-30 LAB
RAD ONC ARIA COURSE ID: NORMAL
RAD ONC ARIA COURSE LAST TREATMENT DATE: NORMAL
RAD ONC ARIA COURSE START DATE: NORMAL
RAD ONC ARIA COURSE TREATMENT ELAPSED DAYS: 17
RAD ONC ARIA FIRST TREATMENT DATE: NORMAL
RAD ONC ARIA PLAN FRACTIONS TREATED TO DATE: 12
RAD ONC ARIA PLAN ID: NORMAL
RAD ONC ARIA PLAN PRESCRIBED DOSE PER FRACTION: 2 GY
RAD ONC ARIA PLAN PRIMARY REFERENCE POINT: NORMAL
RAD ONC ARIA PLAN TOTAL FRACTIONS PRESCRIBED: 23
RAD ONC ARIA PLAN TOTAL PRESCRIBED DOSE: 4600 CGY
RAD ONC ARIA REFERENCE POINT DOSAGE GIVEN TO DATE: 28 GY
RAD ONC ARIA REFERENCE POINT ID: NORMAL
RAD ONC ARIA REFERENCE POINT SESSION DOSAGE GIVEN: 2 GY

## 2022-06-30 PROCEDURE — 77385: CPT | Performed by: RADIOLOGY

## 2022-06-30 PROCEDURE — G6002 STEREOSCOPIC X-RAY GUIDANCE: HCPCS | Performed by: RADIOLOGY

## 2022-07-01 ENCOUNTER — HOSPITAL ENCOUNTER (OUTPATIENT)
Dept: RADIATION ONCOLOGY | Facility: HOSPITAL | Age: 67
Setting detail: RADIATION/ONCOLOGY SERIES
End: 2022-07-01

## 2022-07-01 ENCOUNTER — HOSPITAL ENCOUNTER (OUTPATIENT)
Dept: RADIATION ONCOLOGY | Facility: HOSPITAL | Age: 67
Discharge: HOME | End: 2022-07-01

## 2022-07-01 LAB
RAD ONC ARIA COURSE ID: NORMAL
RAD ONC ARIA COURSE LAST TREATMENT DATE: NORMAL
RAD ONC ARIA COURSE START DATE: NORMAL
RAD ONC ARIA COURSE TREATMENT ELAPSED DAYS: 18
RAD ONC ARIA FIRST TREATMENT DATE: NORMAL
RAD ONC ARIA PLAN FRACTIONS TREATED TO DATE: 13
RAD ONC ARIA PLAN ID: NORMAL
RAD ONC ARIA PLAN PRESCRIBED DOSE PER FRACTION: 2 GY
RAD ONC ARIA PLAN PRIMARY REFERENCE POINT: NORMAL
RAD ONC ARIA PLAN TOTAL FRACTIONS PRESCRIBED: 23
RAD ONC ARIA PLAN TOTAL PRESCRIBED DOSE: 4600 CGY
RAD ONC ARIA REFERENCE POINT DOSAGE GIVEN TO DATE: 30 GY
RAD ONC ARIA REFERENCE POINT ID: NORMAL
RAD ONC ARIA REFERENCE POINT SESSION DOSAGE GIVEN: 2 GY

## 2022-07-01 PROCEDURE — 77385: CPT | Performed by: RADIOLOGY

## 2022-07-01 PROCEDURE — G6002 STEREOSCOPIC X-RAY GUIDANCE: HCPCS | Performed by: RADIOLOGY

## 2022-07-05 ENCOUNTER — HOSPITAL ENCOUNTER (OUTPATIENT)
Dept: RADIATION ONCOLOGY | Facility: HOSPITAL | Age: 67
Discharge: HOME | End: 2022-07-05

## 2022-07-05 ENCOUNTER — RADIATION ONCOLOGY WEEKLY ASSESSMENT (OUTPATIENT)
Dept: RADIATION ONCOLOGY | Facility: HOSPITAL | Age: 67
End: 2022-07-05

## 2022-07-05 VITALS
OXYGEN SATURATION: 97 % | TEMPERATURE: 96.9 F | HEIGHT: 65 IN | DIASTOLIC BLOOD PRESSURE: 83 MMHG | SYSTOLIC BLOOD PRESSURE: 152 MMHG | HEART RATE: 59 BPM | RESPIRATION RATE: 18 BRPM | BODY MASS INDEX: 44.72 KG/M2 | WEIGHT: 268.4 LBS

## 2022-07-05 DIAGNOSIS — Z17.0 MALIGNANT NEOPLASM OF UPPER-OUTER QUADRANT OF LEFT BREAST IN FEMALE, ESTROGEN RECEPTOR POSITIVE: Primary | ICD-10-CM

## 2022-07-05 DIAGNOSIS — C50.412 MALIGNANT NEOPLASM OF UPPER-OUTER QUADRANT OF LEFT BREAST IN FEMALE, ESTROGEN RECEPTOR POSITIVE: Primary | ICD-10-CM

## 2022-07-05 LAB
RAD ONC ARIA COURSE ID: NORMAL
RAD ONC ARIA COURSE LAST TREATMENT DATE: NORMAL
RAD ONC ARIA COURSE START DATE: NORMAL
RAD ONC ARIA COURSE TREATMENT ELAPSED DAYS: 22
RAD ONC ARIA FIRST TREATMENT DATE: NORMAL
RAD ONC ARIA PLAN FRACTIONS TREATED TO DATE: 14
RAD ONC ARIA PLAN ID: NORMAL
RAD ONC ARIA PLAN PRESCRIBED DOSE PER FRACTION: 2 GY
RAD ONC ARIA PLAN PRIMARY REFERENCE POINT: NORMAL
RAD ONC ARIA PLAN TOTAL FRACTIONS PRESCRIBED: 23
RAD ONC ARIA PLAN TOTAL PRESCRIBED DOSE: 4600 CGY
RAD ONC ARIA REFERENCE POINT DOSAGE GIVEN TO DATE: 32 GY
RAD ONC ARIA REFERENCE POINT ID: NORMAL
RAD ONC ARIA REFERENCE POINT SESSION DOSAGE GIVEN: 2 GY

## 2022-07-05 PROCEDURE — G6002 STEREOSCOPIC X-RAY GUIDANCE: HCPCS | Performed by: RADIOLOGY

## 2022-07-05 PROCEDURE — FACE2FACE: Performed by: RADIOLOGY

## 2022-07-05 PROCEDURE — 77385: CPT | Performed by: RADIOLOGY

## 2022-07-05 NOTE — PROGRESS NOTES
"Patient Name: Mary Ann Michaels Date: 2022       : 1955        MRN #: [id number] Diagnosis:   Encounter Diagnosis   Name Primary?   • Malignant neoplasm of upper-outer quadrant of left breast in female, estrogen receptor positive (HCC) Yes                     RADIATION ON TREATMENT VISIT NOTE - BREAST    Treatment Summary    DIBH, Left Chestwall  16/25 fractions delivered to date  3200 cGy of a prescribed 5000 cGy  General:           Review of Systems    [] No new complaints   [x] Skin itching   [] Skin breakdown  [x] Breast swelling   [] Dysphagia   [] Dry cough  [] Productive cough   [] Fever/chills  [] Skin soreness with pain, severity: ----------------   [] Breast pain,  severity: ----------------, location:   [x] Fatigue,  severity: 1 Relief w/ Rest      Skin regimen: Other: Green Tea Spray BID; Eucerin TID; Mometasone TID    [x] Patient given standard skin care instructions for breast cancer treatment including no aluminum containing antiperspirants & no unapproved lotions or soaps.  Patient informed to notify radiation staff if irritation/itching occurs.    Comments/Notes:  Patient is doing well with treatments so far, she states that she slept a lot more this weekend, than usual, but she has no other concerns or complaints at this time.    KPS: 90%       Vital Signs: /83   Pulse 59   Temp 96.9 °F (36.1 °C) (Temporal)   Resp 18   Ht 165.1 cm (65\")   Wt 122 kg (268 lb 6.4 oz)   LMP  (LMP Unknown)   SpO2 97%   BMI 44.66 kg/m²     Weight:   Wt Readings from Last 3 Encounters:   22 122 kg (268 lb 6.4 oz)   22 122 kg (268 lb 6.4 oz)   22 122 kg (270 lb)       Medication:   Current Outpatient Medications:   •  acetaminophen (TYLENOL) 325 MG tablet, Take 2 tablets by mouth Every 4 (Four) Hours As Needed for Mild Pain ., Disp: 60 tablet, Rfl: 0  •  anastrozole (Arimidex) 1 MG tablet, Take 1 tablet by mouth Daily., Disp: 30 tablet, Rfl: 3  •  citalopram (CeleXA) 40 MG tablet, " Take 1 tablet by mouth daily. (Patient taking differently: Take 40 mg by mouth Every Night.), Disp: 30 tablet, Rfl: 5  •  docusate sodium (COLACE) 250 MG capsule, Take 1 capsule by mouth 2 (Two) Times a Day As Needed for Constipation., Disp: 60 capsule, Rfl: 1  •  HYDROcodone-acetaminophen (NORCO) 5-325 MG per tablet, Take 1-2 tablets by mouth Every 4 (Four) Hours As Needed (Pain)., Disp: 20 tablet, Rfl: 0  •  mometasone (Elocon) 0.1 % cream, Apply to affect skin 2-3 times daily during radiation therapy course as needed (itching/irritation), Disp: 45 g, Rfl: 0  •  ondansetron ODT (Zofran ODT) 8 MG disintegrating tablet, Place 1 tablet on the tongue Every 8 (Eight) Hours As Needed for Nausea or Vomiting., Disp: 10 tablet, Rfl: 1  •  propranolol LA (INDERAL LA) 160 MG 24 hr capsule, Take 1 capsule by mouth Every Night., Disp: 90 capsule, Rfl: 3  •  sennosides-docusate (PERICOLACE) 8.6-50 MG per tablet, Take 2 tablets by mouth Daily As Needed for Constipation., Disp: 30 tablet, Rfl: 1  •  spironolactone (ALDACTONE) 25 MG tablet, Take 1 tablet by mouth daily., Disp: 90 tablet, Rfl: 1  •  sulfamethoxazole-trimethoprim (Bactrim DS) 800-160 MG per tablet, Take 1 tablet every 12 hours by oral route for 10 days., Disp: 20 tablet, Rfl: 0  •  zolpidem (AMBIEN) 5 MG tablet, Take 5 mg by mouth At Night As Needed., Disp: , Rfl:   •  zolpidem (AMBIEN) 5 MG tablet, Take 1 tablet by mouth nightly as needed for Sleep, Disp: 30 tablet, Rfl: 2       LABS (Reviewed):  Hematology WBC   Date Value Ref Range Status   04/22/2022 7.34 3.40 - 10.80 10*3/mm3 Final   02/26/2019 5.8 4.5 - 11.0 10*3/uL Final     RBC   Date Value Ref Range Status   04/22/2022 4.64 3.77 - 5.28 10*6/mm3 Final   02/26/2019 4.68 4.0 - 6.0 10*6/uL Final     Hemoglobin   Date Value Ref Range Status   04/22/2022 14.2 12.0 - 15.9 g/dL Final   02/26/2019 14.6 12.0 - 16.0 g/dL Final     Hematocrit   Date Value Ref Range Status   04/22/2022 42.7 34.0 - 46.6 % Final    02/26/2019 42.2 33.0 - 51.0 % Final     Platelets   Date Value Ref Range Status   04/22/2022 185 140 - 450 10*3/mm3 Final   02/26/2019 153 150 - 450 10*3/uL Final      Chemistry   Lab Results   Component Value Date    GLUCOSE 120 04/22/2022    BUN 24 (H) 04/22/2022    CREATININE 1.03 04/22/2022    BCR 23.3 04/22/2022    K 3.9 04/22/2022    CO2 28.7 04/22/2022    CALCIUM 9.7 04/22/2022    ALBUMIN 3.80 04/22/2022    LABIL2 1.2 12/09/2021    AST 20 04/22/2022    ALT 24 04/22/2022         Physical Exam:           Neck: [] Lymphadenopathy  Lungs: [x] Clear to auscultation  CVS: [x] Regular rate & rhythm  Skin: [x] ndGndrndanddndend:nd nd2nd Comments/Notes: itching has improved on mometasone    [x] Review of labs, images, dosimetry, dose delivered, & treatment parameters.    Comments:     [x] Patient treatment setup reviewed.    Comments:     Recommendations: continue skin and supportive measures  Fatigue discussed      [x] Continue RT  [] Change RT Plan [] Hold RT, length:        Approved Electronically By:  Ron Cook MD, 7/5/2022, 09:03 EDT          Confidentiality of this medical record shall be maintained except when use or disclosure is required or permitted by law, regulation or written authorization by the patient.

## 2022-07-06 ENCOUNTER — HOSPITAL ENCOUNTER (OUTPATIENT)
Dept: RADIATION ONCOLOGY | Facility: HOSPITAL | Age: 67
Discharge: HOME | End: 2022-07-06

## 2022-07-06 LAB
RAD ONC ARIA COURSE ID: NORMAL
RAD ONC ARIA COURSE LAST TREATMENT DATE: NORMAL
RAD ONC ARIA COURSE START DATE: NORMAL
RAD ONC ARIA COURSE TREATMENT ELAPSED DAYS: 23
RAD ONC ARIA FIRST TREATMENT DATE: NORMAL
RAD ONC ARIA PLAN FRACTIONS TREATED TO DATE: 15
RAD ONC ARIA PLAN ID: NORMAL
RAD ONC ARIA PLAN PRESCRIBED DOSE PER FRACTION: 2 GY
RAD ONC ARIA PLAN PRIMARY REFERENCE POINT: NORMAL
RAD ONC ARIA PLAN TOTAL FRACTIONS PRESCRIBED: 23
RAD ONC ARIA PLAN TOTAL PRESCRIBED DOSE: 4600 CGY
RAD ONC ARIA REFERENCE POINT DOSAGE GIVEN TO DATE: 34 GY
RAD ONC ARIA REFERENCE POINT ID: NORMAL
RAD ONC ARIA REFERENCE POINT SESSION DOSAGE GIVEN: 2 GY

## 2022-07-06 PROCEDURE — 77385: CPT | Performed by: RADIOLOGY

## 2022-07-06 PROCEDURE — G6002 STEREOSCOPIC X-RAY GUIDANCE: HCPCS | Performed by: RADIOLOGY

## 2022-07-06 PROCEDURE — 77427 RADIATION TX MANAGEMENT X5: CPT | Performed by: RADIOLOGY

## 2022-07-07 ENCOUNTER — HOSPITAL ENCOUNTER (OUTPATIENT)
Dept: RADIATION ONCOLOGY | Facility: HOSPITAL | Age: 67
Discharge: HOME | End: 2022-07-07

## 2022-07-07 LAB
RAD ONC ARIA COURSE ID: NORMAL
RAD ONC ARIA COURSE LAST TREATMENT DATE: NORMAL
RAD ONC ARIA COURSE START DATE: NORMAL
RAD ONC ARIA COURSE TREATMENT ELAPSED DAYS: 24
RAD ONC ARIA FIRST TREATMENT DATE: NORMAL
RAD ONC ARIA PLAN FRACTIONS TREATED TO DATE: 16
RAD ONC ARIA PLAN ID: NORMAL
RAD ONC ARIA PLAN PRESCRIBED DOSE PER FRACTION: 2 GY
RAD ONC ARIA PLAN PRIMARY REFERENCE POINT: NORMAL
RAD ONC ARIA PLAN TOTAL FRACTIONS PRESCRIBED: 23
RAD ONC ARIA PLAN TOTAL PRESCRIBED DOSE: 4600 CGY
RAD ONC ARIA REFERENCE POINT DOSAGE GIVEN TO DATE: 36 GY
RAD ONC ARIA REFERENCE POINT ID: NORMAL
RAD ONC ARIA REFERENCE POINT SESSION DOSAGE GIVEN: 2 GY

## 2022-07-07 PROCEDURE — G6002 STEREOSCOPIC X-RAY GUIDANCE: HCPCS | Performed by: RADIOLOGY

## 2022-07-07 PROCEDURE — 77385: CPT | Performed by: RADIOLOGY

## 2022-07-07 PROCEDURE — 77336 RADIATION PHYSICS CONSULT: CPT | Performed by: RADIOLOGY

## 2022-07-08 ENCOUNTER — HOSPITAL ENCOUNTER (OUTPATIENT)
Dept: RADIATION ONCOLOGY | Facility: HOSPITAL | Age: 67
Discharge: HOME | End: 2022-07-08

## 2022-07-08 LAB
RAD ONC ARIA COURSE ID: NORMAL
RAD ONC ARIA COURSE LAST TREATMENT DATE: NORMAL
RAD ONC ARIA COURSE START DATE: NORMAL
RAD ONC ARIA COURSE TREATMENT ELAPSED DAYS: 25
RAD ONC ARIA FIRST TREATMENT DATE: NORMAL
RAD ONC ARIA PLAN FRACTIONS TREATED TO DATE: 17
RAD ONC ARIA PLAN ID: NORMAL
RAD ONC ARIA PLAN PRESCRIBED DOSE PER FRACTION: 2 GY
RAD ONC ARIA PLAN PRIMARY REFERENCE POINT: NORMAL
RAD ONC ARIA PLAN TOTAL FRACTIONS PRESCRIBED: 23
RAD ONC ARIA PLAN TOTAL PRESCRIBED DOSE: 4600 CGY
RAD ONC ARIA REFERENCE POINT DOSAGE GIVEN TO DATE: 38 GY
RAD ONC ARIA REFERENCE POINT ID: NORMAL
RAD ONC ARIA REFERENCE POINT SESSION DOSAGE GIVEN: 2 GY

## 2022-07-08 PROCEDURE — 77385: CPT | Performed by: RADIOLOGY

## 2022-07-08 PROCEDURE — G6002 STEREOSCOPIC X-RAY GUIDANCE: HCPCS | Performed by: RADIOLOGY

## 2022-07-11 ENCOUNTER — HOSPITAL ENCOUNTER (OUTPATIENT)
Dept: RADIATION ONCOLOGY | Facility: HOSPITAL | Age: 67
Discharge: HOME | End: 2022-07-11

## 2022-07-11 LAB
RAD ONC ARIA COURSE ID: NORMAL
RAD ONC ARIA COURSE LAST TREATMENT DATE: NORMAL
RAD ONC ARIA COURSE START DATE: NORMAL
RAD ONC ARIA COURSE TREATMENT ELAPSED DAYS: 28
RAD ONC ARIA FIRST TREATMENT DATE: NORMAL
RAD ONC ARIA PLAN FRACTIONS TREATED TO DATE: 18
RAD ONC ARIA PLAN ID: NORMAL
RAD ONC ARIA PLAN PRESCRIBED DOSE PER FRACTION: 2 GY
RAD ONC ARIA PLAN PRIMARY REFERENCE POINT: NORMAL
RAD ONC ARIA PLAN TOTAL FRACTIONS PRESCRIBED: 23
RAD ONC ARIA PLAN TOTAL PRESCRIBED DOSE: 4600 CGY
RAD ONC ARIA REFERENCE POINT DOSAGE GIVEN TO DATE: 40 GY
RAD ONC ARIA REFERENCE POINT ID: NORMAL
RAD ONC ARIA REFERENCE POINT SESSION DOSAGE GIVEN: 2 GY

## 2022-07-11 PROCEDURE — G6002 STEREOSCOPIC X-RAY GUIDANCE: HCPCS | Performed by: RADIOLOGY

## 2022-07-11 PROCEDURE — 77385: CPT | Performed by: RADIOLOGY

## 2022-07-12 ENCOUNTER — HOSPITAL ENCOUNTER (OUTPATIENT)
Dept: RADIATION ONCOLOGY | Facility: HOSPITAL | Age: 67
Discharge: HOME | End: 2022-07-12

## 2022-07-12 ENCOUNTER — RADIATION ONCOLOGY WEEKLY ASSESSMENT (OUTPATIENT)
Dept: RADIATION ONCOLOGY | Facility: HOSPITAL | Age: 67
End: 2022-07-12

## 2022-07-12 VITALS
BODY MASS INDEX: 45.15 KG/M2 | RESPIRATION RATE: 20 BRPM | OXYGEN SATURATION: 96 % | DIASTOLIC BLOOD PRESSURE: 83 MMHG | HEIGHT: 65 IN | HEART RATE: 54 BPM | WEIGHT: 271 LBS | SYSTOLIC BLOOD PRESSURE: 152 MMHG | TEMPERATURE: 97.4 F

## 2022-07-12 DIAGNOSIS — C50.412 MALIGNANT NEOPLASM OF UPPER-OUTER QUADRANT OF LEFT BREAST IN FEMALE, ESTROGEN RECEPTOR POSITIVE: Primary | ICD-10-CM

## 2022-07-12 DIAGNOSIS — Z17.0 MALIGNANT NEOPLASM OF UPPER-OUTER QUADRANT OF LEFT BREAST IN FEMALE, ESTROGEN RECEPTOR POSITIVE: Primary | ICD-10-CM

## 2022-07-12 LAB
RAD ONC ARIA COURSE ID: NORMAL
RAD ONC ARIA COURSE LAST TREATMENT DATE: NORMAL
RAD ONC ARIA COURSE START DATE: NORMAL
RAD ONC ARIA COURSE TREATMENT ELAPSED DAYS: 29
RAD ONC ARIA FIRST TREATMENT DATE: NORMAL
RAD ONC ARIA PLAN FRACTIONS TREATED TO DATE: 19
RAD ONC ARIA PLAN ID: NORMAL
RAD ONC ARIA PLAN PRESCRIBED DOSE PER FRACTION: 2 GY
RAD ONC ARIA PLAN PRIMARY REFERENCE POINT: NORMAL
RAD ONC ARIA PLAN TOTAL FRACTIONS PRESCRIBED: 23
RAD ONC ARIA PLAN TOTAL PRESCRIBED DOSE: 4600 CGY
RAD ONC ARIA REFERENCE POINT DOSAGE GIVEN TO DATE: 42 GY
RAD ONC ARIA REFERENCE POINT ID: NORMAL
RAD ONC ARIA REFERENCE POINT SESSION DOSAGE GIVEN: 2 GY

## 2022-07-12 PROCEDURE — G6002 STEREOSCOPIC X-RAY GUIDANCE: HCPCS | Performed by: RADIOLOGY

## 2022-07-12 PROCEDURE — 77385: CPT | Performed by: RADIOLOGY

## 2022-07-12 NOTE — PROGRESS NOTES
"Patient Name: Mary Ann Michaels Date: 2022       : 1955        MRN #: [id number] Diagnosis:   Encounter Diagnosis   Name Primary?   • Malignant neoplasm of upper-outer quadrant of left breast in female, estrogen receptor positive (HCC) Yes                 RADIATION ON TREATMENT VISIT NOTE - BREAST    Treatment Summary    DIBH, Left Chestwall   fractions delivered to date  Treatment start date: 22; no treatment breaks   4200 cGy of a prescribed 5000 cGy delivered to date.  General:           Review of Systems    [] No new complaints   [x] Skin itching   [] Skin breakdown  [x] Breast swelling   [] Dysphagia   [] Dry cough  [] Productive cough   [] Fever/chills  [] Skin soreness with pain, severity: ----------------   [] Breast pain,  severity: ----------------, location:   [x] Fatigue,  severity: 1 Relief w/ Rest      Skin regimen: Other: Green Tea Spray TID, Mometasone Qday, Eucerin TID    [x] Patient given standard skin care instructions for breast cancer treatment including no aluminum containing antiperspirants & no unapproved lotions or soaps.  Patient informed to notify radiation staff if irritation/itching occurs.    Comments/Notes:  Patient is doing well with treatments so far, she stated that she is having some itching around her breast, but is using mometasone once a day. She has no other concerns or complaints.    KPS: 100%       Vital Signs: /83   Pulse 54   Temp 97.4 °F (36.3 °C) (Temporal)   Resp 20   Ht 165.1 cm (65\")   Wt 123 kg (271 lb)   LMP  (LMP Unknown)   SpO2 96%   BMI 45.10 kg/m²     Weight:   Wt Readings from Last 3 Encounters:   22 123 kg (271 lb)   22 122 kg (268 lb 6.4 oz)   22 122 kg (268 lb 6.4 oz)       Medication:   Current Outpatient Medications:   •  acetaminophen (TYLENOL) 325 MG tablet, Take 2 tablets by mouth Every 4 (Four) Hours As Needed for Mild Pain ., Disp: 60 tablet, Rfl: 0  •  anastrozole (Arimidex) 1 MG tablet, Take 1 " tablet by mouth Daily., Disp: 30 tablet, Rfl: 3  •  citalopram (CeleXA) 40 MG tablet, Take 1 tablet by mouth daily. (Patient taking differently: Take 40 mg by mouth Every Night.), Disp: 30 tablet, Rfl: 5  •  docusate sodium (COLACE) 250 MG capsule, Take 1 capsule by mouth 2 (Two) Times a Day As Needed for Constipation., Disp: 60 capsule, Rfl: 1  •  HYDROcodone-acetaminophen (NORCO) 5-325 MG per tablet, Take 1-2 tablets by mouth Every 4 (Four) Hours As Needed (Pain)., Disp: 20 tablet, Rfl: 0  •  mometasone (Elocon) 0.1 % cream, Apply to affect skin 2-3 times daily during radiation therapy course as needed (itching/irritation), Disp: 45 g, Rfl: 0  •  ondansetron ODT (Zofran ODT) 8 MG disintegrating tablet, Place 1 tablet on the tongue Every 8 (Eight) Hours As Needed for Nausea or Vomiting., Disp: 10 tablet, Rfl: 1  •  propranolol LA (INDERAL LA) 160 MG 24 hr capsule, Take 1 capsule by mouth Every Night., Disp: 90 capsule, Rfl: 3  •  sennosides-docusate (PERICOLACE) 8.6-50 MG per tablet, Take 2 tablets by mouth Daily As Needed for Constipation., Disp: 30 tablet, Rfl: 1  •  spironolactone (ALDACTONE) 25 MG tablet, Take 1 tablet by mouth daily., Disp: 90 tablet, Rfl: 1  •  sulfamethoxazole-trimethoprim (Bactrim DS) 800-160 MG per tablet, Take 1 tablet every 12 hours by oral route for 10 days., Disp: 20 tablet, Rfl: 0  •  zolpidem (AMBIEN) 5 MG tablet, Take 5 mg by mouth At Night As Needed., Disp: , Rfl:   •  zolpidem (AMBIEN) 5 MG tablet, Take 1 tablet by mouth nightly as needed for Sleep, Disp: 30 tablet, Rfl: 2       LABS (Reviewed):  Hematology WBC   Date Value Ref Range Status   04/22/2022 7.34 3.40 - 10.80 10*3/mm3 Final   02/26/2019 5.8 4.5 - 11.0 10*3/uL Final     RBC   Date Value Ref Range Status   04/22/2022 4.64 3.77 - 5.28 10*6/mm3 Final   02/26/2019 4.68 4.0 - 6.0 10*6/uL Final     Hemoglobin   Date Value Ref Range Status   04/22/2022 14.2 12.0 - 15.9 g/dL Final   02/26/2019 14.6 12.0 - 16.0 g/dL Final      Hematocrit   Date Value Ref Range Status   04/22/2022 42.7 34.0 - 46.6 % Final   02/26/2019 42.2 33.0 - 51.0 % Final     Platelets   Date Value Ref Range Status   04/22/2022 185 140 - 450 10*3/mm3 Final   02/26/2019 153 150 - 450 10*3/uL Final      Chemistry   Lab Results   Component Value Date    GLUCOSE 120 04/22/2022    BUN 24 (H) 04/22/2022    CREATININE 1.03 04/22/2022    BCR 23.3 04/22/2022    K 3.9 04/22/2022    CO2 28.7 04/22/2022    CALCIUM 9.7 04/22/2022    ALBUMIN 3.80 04/22/2022    LABIL2 1.2 12/09/2021    AST 20 04/22/2022    ALT 24 04/22/2022         Physical Exam:           Neck: [] Lymphadenopathy  Lungs: [x] Clear to auscultation  CVS: [x] Regular rate & rhythm  Skin: [x] ndGndrndanddndend:nd nd2nd Comments/Notes:     [x] Review of labs, images, dosimetry, dose delivered, & treatment parameters.    Comments:     [x] Patient treatment setup reviewed.    Comments:     Recommendations:  Mometasone  Silvadene 1% cream given for TID use as needed.    [x] Continue RT  [] Change RT Plan [] Hold RT, length:        Approved Electronically By:  Ron Cook MD, 7/12/2022, 08:59 EDT          Confidentiality of this medical record shall be maintained except when use or disclosure is required or permitted by law, regulation or written authorization by the patient.

## 2022-07-13 ENCOUNTER — HOSPITAL ENCOUNTER (OUTPATIENT)
Dept: RADIATION ONCOLOGY | Facility: HOSPITAL | Age: 67
Discharge: HOME | End: 2022-07-13

## 2022-07-13 LAB
RAD ONC ARIA COURSE ID: NORMAL
RAD ONC ARIA COURSE LAST TREATMENT DATE: NORMAL
RAD ONC ARIA COURSE START DATE: NORMAL
RAD ONC ARIA COURSE TREATMENT ELAPSED DAYS: 30
RAD ONC ARIA FIRST TREATMENT DATE: NORMAL
RAD ONC ARIA PLAN FRACTIONS TREATED TO DATE: 20
RAD ONC ARIA PLAN ID: NORMAL
RAD ONC ARIA PLAN PRESCRIBED DOSE PER FRACTION: 2 GY
RAD ONC ARIA PLAN PRIMARY REFERENCE POINT: NORMAL
RAD ONC ARIA PLAN TOTAL FRACTIONS PRESCRIBED: 23
RAD ONC ARIA PLAN TOTAL PRESCRIBED DOSE: 4600 CGY
RAD ONC ARIA REFERENCE POINT DOSAGE GIVEN TO DATE: 44 GY
RAD ONC ARIA REFERENCE POINT ID: NORMAL
RAD ONC ARIA REFERENCE POINT SESSION DOSAGE GIVEN: 2 GY

## 2022-07-13 PROCEDURE — 77385: CPT | Performed by: RADIOLOGY

## 2022-07-13 PROCEDURE — 77427 RADIATION TX MANAGEMENT X5: CPT | Performed by: RADIOLOGY

## 2022-07-13 PROCEDURE — G6002 STEREOSCOPIC X-RAY GUIDANCE: HCPCS | Performed by: RADIOLOGY

## 2022-07-14 ENCOUNTER — HOSPITAL ENCOUNTER (OUTPATIENT)
Dept: RADIATION ONCOLOGY | Facility: HOSPITAL | Age: 67
Discharge: HOME | End: 2022-07-14

## 2022-07-14 LAB
RAD ONC ARIA COURSE ID: NORMAL
RAD ONC ARIA COURSE LAST TREATMENT DATE: NORMAL
RAD ONC ARIA COURSE START DATE: NORMAL
RAD ONC ARIA COURSE TREATMENT ELAPSED DAYS: 31
RAD ONC ARIA FIRST TREATMENT DATE: NORMAL
RAD ONC ARIA PLAN FRACTIONS TREATED TO DATE: 21
RAD ONC ARIA PLAN ID: NORMAL
RAD ONC ARIA PLAN PRESCRIBED DOSE PER FRACTION: 2 GY
RAD ONC ARIA PLAN PRIMARY REFERENCE POINT: NORMAL
RAD ONC ARIA PLAN TOTAL FRACTIONS PRESCRIBED: 23
RAD ONC ARIA PLAN TOTAL PRESCRIBED DOSE: 4600 CGY
RAD ONC ARIA REFERENCE POINT DOSAGE GIVEN TO DATE: 46 GY
RAD ONC ARIA REFERENCE POINT ID: NORMAL
RAD ONC ARIA REFERENCE POINT SESSION DOSAGE GIVEN: 2 GY

## 2022-07-14 PROCEDURE — 77336 RADIATION PHYSICS CONSULT: CPT | Performed by: RADIOLOGY

## 2022-07-14 PROCEDURE — G6002 STEREOSCOPIC X-RAY GUIDANCE: HCPCS | Performed by: RADIOLOGY

## 2022-07-14 PROCEDURE — 77385: CPT | Performed by: RADIOLOGY

## 2022-07-15 ENCOUNTER — HOSPITAL ENCOUNTER (OUTPATIENT)
Dept: RADIATION ONCOLOGY | Facility: HOSPITAL | Age: 67
Discharge: HOME | End: 2022-07-15

## 2022-07-15 LAB
RAD ONC ARIA COURSE ID: NORMAL
RAD ONC ARIA COURSE LAST TREATMENT DATE: NORMAL
RAD ONC ARIA COURSE START DATE: NORMAL
RAD ONC ARIA COURSE TREATMENT ELAPSED DAYS: 32
RAD ONC ARIA FIRST TREATMENT DATE: NORMAL
RAD ONC ARIA PLAN FRACTIONS TREATED TO DATE: 22
RAD ONC ARIA PLAN ID: NORMAL
RAD ONC ARIA PLAN PRESCRIBED DOSE PER FRACTION: 2 GY
RAD ONC ARIA PLAN PRIMARY REFERENCE POINT: NORMAL
RAD ONC ARIA PLAN TOTAL FRACTIONS PRESCRIBED: 23
RAD ONC ARIA PLAN TOTAL PRESCRIBED DOSE: 4600 CGY
RAD ONC ARIA REFERENCE POINT DOSAGE GIVEN TO DATE: 48 GY
RAD ONC ARIA REFERENCE POINT ID: NORMAL
RAD ONC ARIA REFERENCE POINT SESSION DOSAGE GIVEN: 2 GY

## 2022-07-15 PROCEDURE — 77385: CPT | Performed by: RADIOLOGY

## 2022-07-15 PROCEDURE — G6002 STEREOSCOPIC X-RAY GUIDANCE: HCPCS | Performed by: RADIOLOGY

## 2022-07-18 LAB
RAD ONC ARIA COURSE END DATE: NORMAL
RAD ONC ARIA COURSE ID: NORMAL
RAD ONC ARIA COURSE ID: NORMAL
RAD ONC ARIA COURSE LAST TREATMENT DATE: NORMAL
RAD ONC ARIA COURSE LAST TREATMENT DATE: NORMAL
RAD ONC ARIA COURSE START DATE: NORMAL
RAD ONC ARIA COURSE START DATE: NORMAL
RAD ONC ARIA COURSE TREATMENT ELAPSED DAYS: 35
RAD ONC ARIA COURSE TREATMENT ELAPSED DAYS: 35
RAD ONC ARIA FIRST TREATMENT DATE: NORMAL
RAD ONC ARIA FIRST TREATMENT DATE: NORMAL
RAD ONC ARIA PLAN FRACTIONS TREATED TO DATE: 2
RAD ONC ARIA PLAN FRACTIONS TREATED TO DATE: 23
RAD ONC ARIA PLAN FRACTIONS TREATED TO DATE: 23
RAD ONC ARIA PLAN ID: NORMAL
RAD ONC ARIA PLAN NAME: NORMAL
RAD ONC ARIA PLAN NAME: NORMAL
RAD ONC ARIA PLAN PRESCRIBED DOSE PER FRACTION: 2 GY
RAD ONC ARIA PLAN PRIMARY REFERENCE POINT: NORMAL
RAD ONC ARIA PLAN TOTAL FRACTIONS PRESCRIBED: 23
RAD ONC ARIA PLAN TOTAL FRACTIONS PRESCRIBED: 23
RAD ONC ARIA PLAN TOTAL FRACTIONS PRESCRIBED: 25
RAD ONC ARIA PLAN TOTAL PRESCRIBED DOSE: 4600 CGY
RAD ONC ARIA PLAN TOTAL PRESCRIBED DOSE: 4600 CGY
RAD ONC ARIA PLAN TOTAL PRESCRIBED DOSE: 5000 CGY
RAD ONC ARIA REFERENCE POINT DOSAGE GIVEN TO DATE: 50 GY
RAD ONC ARIA REFERENCE POINT DOSAGE GIVEN TO DATE: 50 GY
RAD ONC ARIA REFERENCE POINT ID: NORMAL
RAD ONC ARIA REFERENCE POINT ID: NORMAL
RAD ONC ARIA REFERENCE POINT SESSION DOSAGE GIVEN: 2 GY

## 2022-07-18 PROCEDURE — 77385: CPT | Performed by: RADIOLOGY

## 2022-07-18 PROCEDURE — G6002 STEREOSCOPIC X-RAY GUIDANCE: HCPCS | Performed by: RADIOLOGY

## 2022-07-27 ENCOUNTER — NURSE NAVIGATOR (OUTPATIENT)
Dept: OTHER | Facility: HOSPITAL | Age: 67
End: 2022-07-27

## 2022-07-27 NOTE — PROGRESS NOTES
Call placed to patient for f/u telephone visit. States she completed XRT last Monday. Plans for reconstruction surgery in the spring. Says she will contact medical oncology to confirm when she should start Arimidex. No c/o pain or discomfort voiced. Voiced concerns regarding not having her lymphedema sleeve until last week. Says it was ordered in May and was worried about swelling. Encouraged patient to re-schedule appointment to meet with PT/OT in lymphedema clinic for assessment.  Explained survivorship program. Verbalized understanding but declined referral. No additional needs noted. Will sign off......Shahnaz Obrien RN, Oncology Nurse Navigator

## 2022-07-29 ENCOUNTER — TELEPHONE (OUTPATIENT)
Dept: ONCOLOGY | Facility: CLINIC | Age: 67
End: 2022-07-29

## 2022-07-29 NOTE — TELEPHONE ENCOUNTER
Caller: Rafaela Michaels    Relationship: Self    Best call back number: [phone]    What is the best time to reach you: ASAP    Who are you requesting to speak with (clinical staff, provider,  specific staff member): DR. TENORIO'S NURSE    Do you know the name of the person who called: RAFAELA    What was the call regarding: PATIENT COMPLETED RADIATION LAST WEEK & IS TO START TAKING  ANASTROZOLE, HER QUESTIONS IS, WHEN DOES SHE START TAKING IT?    Do you require a callback:  YES, PLEASE

## 2022-07-29 NOTE — TELEPHONE ENCOUNTER
Returned call to patient who wanted to know when to start her anastrozole.  Reviewed Dr. Dhillon's notes and she can start the anastrozole anytime.  She has an appointment with Dr. Dhillon on 8/19/2022.  She v/u.

## 2022-08-02 ENCOUNTER — TELEPHONE (OUTPATIENT)
Dept: PHYSICAL THERAPY | Facility: HOSPITAL | Age: 67
End: 2022-08-02

## 2022-08-02 NOTE — TELEPHONE ENCOUNTER
Reached out to patient on behalf of PT lymphedema clinic to discuss concerns with sleeve ordering and communication concerns with our office. Did verify that patient received a sleeve in mid to late July.  Encouraged patient to return for follow up care/assessment and provided direct number to myself, the manager, and our scheduling team. Patient expressed that she would call to reschedule when she was ready as she still had reservations on this date.

## 2022-08-03 ENCOUNTER — TELEPHONE (OUTPATIENT)
Dept: SURGERY | Facility: CLINIC | Age: 67
End: 2022-08-03

## 2022-08-03 NOTE — TELEPHONE ENCOUNTER
Due to scheduling conflict, Rescheduled F/u appointment from 12/21/2022 to 12/19/2022 @ 10:30am, arrive @ 10:15am in office.    Called Pt. Patient expressed v/u of appointment change.     Appointment reminder sent in mail.   pts mother in law called PD 2* pt making delusional statements and being disruptive

## 2022-08-17 ENCOUNTER — HOSPITAL ENCOUNTER (OUTPATIENT)
Dept: RADIATION ONCOLOGY | Facility: HOSPITAL | Age: 67
Setting detail: RADIATION/ONCOLOGY SERIES
End: 2022-08-17

## 2022-08-19 ENCOUNTER — OFFICE VISIT (OUTPATIENT)
Dept: ONCOLOGY | Facility: CLINIC | Age: 67
End: 2022-08-19

## 2022-08-19 ENCOUNTER — OFFICE VISIT (OUTPATIENT)
Dept: RADIATION ONCOLOGY | Facility: HOSPITAL | Age: 67
End: 2022-08-19

## 2022-08-19 ENCOUNTER — HOSPITAL ENCOUNTER (OUTPATIENT)
Dept: PHYSICAL THERAPY | Facility: HOSPITAL | Age: 67
Setting detail: THERAPIES SERIES
Discharge: HOME | End: 2022-08-19

## 2022-08-19 ENCOUNTER — APPOINTMENT (OUTPATIENT)
Dept: LAB | Facility: HOSPITAL | Age: 67
End: 2022-08-19

## 2022-08-19 VITALS
SYSTOLIC BLOOD PRESSURE: 128 MMHG | DIASTOLIC BLOOD PRESSURE: 91 MMHG | RESPIRATION RATE: 18 BRPM | WEIGHT: 267.4 LBS | OXYGEN SATURATION: 98 % | TEMPERATURE: 96.8 F | HEART RATE: 54 BPM | BODY MASS INDEX: 44.55 KG/M2 | HEIGHT: 65 IN

## 2022-08-19 VITALS
HEART RATE: 59 BPM | DIASTOLIC BLOOD PRESSURE: 86 MMHG | OXYGEN SATURATION: 95 % | BODY MASS INDEX: 45.64 KG/M2 | TEMPERATURE: 97.5 F | HEIGHT: 64 IN | WEIGHT: 267.3 LBS | RESPIRATION RATE: 16 BRPM | SYSTOLIC BLOOD PRESSURE: 153 MMHG

## 2022-08-19 DIAGNOSIS — C50.412 MALIGNANT NEOPLASM OF UPPER-OUTER QUADRANT OF LEFT BREAST IN FEMALE, ESTROGEN RECEPTOR POSITIVE: ICD-10-CM

## 2022-08-19 DIAGNOSIS — Z17.0 MALIGNANT NEOPLASM OF UPPER-OUTER QUADRANT OF LEFT BREAST IN FEMALE, ESTROGEN RECEPTOR POSITIVE: ICD-10-CM

## 2022-08-19 DIAGNOSIS — C50.412 MALIGNANT NEOPLASM OF UPPER-OUTER QUADRANT OF LEFT BREAST IN FEMALE, ESTROGEN RECEPTOR POSITIVE: Primary | ICD-10-CM

## 2022-08-19 DIAGNOSIS — Z17.0 MALIGNANT NEOPLASM OF UPPER-OUTER QUADRANT OF LEFT BREAST IN FEMALE, ESTROGEN RECEPTOR POSITIVE: Primary | ICD-10-CM

## 2022-08-19 DIAGNOSIS — Z91.89 AT RISK FOR LYMPHEDEMA: ICD-10-CM

## 2022-08-19 DIAGNOSIS — Z90.12 S/P MASTECTOMY, LEFT: Primary | ICD-10-CM

## 2022-08-19 PROCEDURE — 99214 OFFICE O/P EST MOD 30 MIN: CPT | Performed by: INTERNAL MEDICINE

## 2022-08-19 PROCEDURE — 99024 POSTOP FOLLOW-UP VISIT: CPT | Performed by: RADIOLOGY

## 2022-08-19 PROCEDURE — 93702 BIS XTRACELL FLUID ANALYSIS: CPT

## 2022-08-19 PROCEDURE — G0463 HOSPITAL OUTPT CLINIC VISIT: HCPCS | Performed by: RADIOLOGY

## 2022-08-19 NOTE — PROGRESS NOTES
Subjective   Mary Ann Michaels is a 67 y.o. female.  Referred by Dr. Isaac for left breast invasive lobular carcinoma    History of Present Illness    is a 66-year-old postmenopausal  lady with hypertension presented with a left breast screening abnormality.  Family history is significant for her daughter with DCIS and currently on tamoxifen at the age of 49, mother with history of ovarian cancer at the age of 67, maternal grandmother with history of pancreatic cancer in her 70s, 2 brothers with liver cancer.    12/7/2021-bilateral screening mammogram-new left breast focal asymmetry.  Diagnostic mammogram recommended.  Left breast ultrasound recommended.  Right breast is benign.    12/17/2021-left breast diagnostic mammogram and ultrasound  6 mm indistinct mass in the outer slightly upper left breast, posterior depth.  Ultrasound shows a 5 x 4 x 5 mm indistinct hypoechoic mass at 2:00, 9 cm from the nipple.  Ultrasound-guided biopsy of the left breast mass recommended.    1/12/2022-ultrasound-guided biopsy  1.specimen 1-left breast 2:00, 15 cm from the nipple-invasive lobular carcinoma, largest tumor focus 5 mm  Negative for carcinoma in situ or lymphovascular space invasion.  ER positive strong 100%  RI positive strong 100%  HER2 negative    Specimen to-left breast 2:00, 9 cm from the nipple  Invasive lobular carcinoma, focal atypical lobular hyperplasia  Negative for carcinoma in situ or lymphovascular space invasion.    1/30/2022-bilateral breast MRI  V shaped metallic clip is seen at the 2 o'clock position in the upper outer quadrant of the left breast which does not show any surrounding enhancement.  But this is the biopsy-proven site of malignancy.  This is posterior to a 5.2 cm clumped enhancement.  This is suspicious for additional disease.  No evidence of left axillary lymphadenopathy.  There is a more posterior located culture metallic clip in the posterior one third at 2 o'clock position  which is immediately adjacent to a 0.9 cm ill-defined enhancing lesion.  This represents biopsy-proven site of malignancy.  No other suspicious findings in the right breast.    She was seen by Dr. Isaac who performed a left mastectomy and sentinel lymph node biopsy on 3/30/2022  Pathology was consistent with invasive lobular carcinoma, 2 foci.  Larger focus measures 6 cm and the smaller focus measures 3.5 mm and noted in the nipple.  No skin ulceration.  7 sentinel lymph nodes were evaluated and negative for malignancy.  Both the tumors were strongly ER/IN positive at 99%, HER2 negative and Ki-67 was 12 to 13% on the larger tumor and 8% on the smaller tumor.    She is undergoing reconstruction by Dr. Montgomery.  She presents today accompanied by her friend Natalya to discuss adjuvant therapy.    Oncotype DX recurrence score noted to be 12 with a 9-year risk of distant metastasis of 3% with tamoxifen or AI alone.  And less than 1% benefit from chemotherapy.    Ms. Michaels has met with Dr. Cook and completed adjuvant radiation in July 2022.    Started anastrozole July 2022    Interval history  Tolerating anastrozole well with no new issues.  Has some tightness in the left chest wall due to the expander but no other complaints.  Right breast with no issues.    The following portions of the patient's history were reviewed and updated as appropriate: allergies, current medications, past family history, past medical history, past social history, past surgical history and problem list.    Past Medical History:   Diagnosis Date   • Anxiety and depression    • Breast cancer (HCC)    • Female stress incontinence    • Hiatal hernia    • History of left breast cancer    • Hypertension    • Insomnia    • Sleep apnea     CPAP        Past Surgical History:   Procedure Laterality Date   • BREAST RECONSTRUCTION Left 3/30/2022    Procedure: LEFT PLACEMENT TISSUE EXPANDER AND ALLODERM;  Surgeon: Raj Montgomery MD;  Location:   "[facility];  Service: Plastics;  Laterality: Left;   •  SECTION     • CHOLECYSTECTOMY     • HYSTERECTOMY     • KNEE SURGERY Right     ARTHROSCOPY TENDON REPAIR   • MASTECTOMY W/ SENTINEL NODE BIOPSY Left 3/30/2022    Procedure: left total mastectomy with left axillary sentinel lymph node biopsy;  Surgeon: Catherine Isaac MD;  Location:  [facility];  Service: General;  Laterality: Left;        Family History   Problem Relation Age of Onset   • Ovarian cancer Mother 67   • Lung cancer Father    • Liver cancer Brother    • Liver cancer Brother    • Pancreatic cancer Maternal Grandmother    • Malig Hyperthermia Neg Hx         Social History     Socioeconomic History   • Marital status:    Tobacco Use   • Smoking status: Never Smoker   • Smokeless tobacco: Never Used   Vaping Use   • Vaping Use: Never used   Substance and Sexual Activity   • Alcohol use: Never   • Drug use: Never   • Sexual activity: Defer        OB History    No obstetric history on file.     Age at menarche-11  Age at first live childbirth-18  She had a hysterectomy and a bilateral salpingo-oophorectomy at the age of 39  No use of hormone replacement therapy  Oral contraceptive pill use for 2 to 3 years   3 para 2  1    No Known Allergies         Review of Systems   Constitutional: Positive for unexpected weight gain.   HENT: Negative.    Eyes: Negative.    Respiratory: Negative.    Cardiovascular: Negative.    Gastrointestinal: Negative.    Endocrine: Negative.    Genitourinary: Negative.    Musculoskeletal: Negative.    Allergic/Immunologic: Negative.    Hematological: Negative.    Psychiatric/Behavioral: Negative.      Review of systems as mentioned in the HPI    Objective   Blood pressure 153/86, pulse 59, temperature 97.5 °F (36.4 °C), temperature source Temporal, resp. rate 16, height 163.8 cm (64.49\"), weight 121 kg (267 lb 4.8 oz), SpO2 95 %, not currently breastfeeding.   Physical Exam  Vitals reviewed. "   Constitutional:       Appearance: Normal appearance. She is obese.   HENT:      Head: Normocephalic.      Nose: Nose normal.      Mouth/Throat:      Mouth: Mucous membranes are moist.   Eyes:      Conjunctiva/sclera: Conjunctivae normal.      Pupils: Pupils are equal, round, and reactive to light.   Cardiovascular:      Rate and Rhythm: Normal rate.   Pulmonary:      Effort: Pulmonary effort is normal.   Abdominal:      General: Abdomen is flat.   Musculoskeletal:         General: Normal range of motion.      Cervical back: Normal range of motion.   Skin:     General: Skin is warm.   Neurological:      General: No focal deficit present.      Mental Status: She is alert and oriented to person, place, and time. Mental status is at baseline.   Psychiatric:         Mood and Affect: Mood normal.         Behavior: Behavior normal.         Thought Content: Thought content normal.         Judgment: Judgment normal.       Breast Exam: Left breast status post mastectomy with expander in place.  Right breast appears normal on inspection.  Nipple inverted.  No other palpable abnormalities of the right breast.    I have reexamined the patient and the results are consistent with the previously documented exam. Barbie Dhillon MD      No visits with results within 30 Day(s) from this visit.   Latest known visit with results is:   Orders Only on 07/18/2022   Component Date Value Ref Range Status   • Course ID 07/18/2022 C1   Final   • Course Start Date 07/18/2022 5/24/2022 10:29 AM   Final   • Course End Date 07/18/2022 7/18/2022 11:07 AM   Final   • Course First Treatment Date 07/18/2022 6/13/2022  8:31 AM   Final   • Course Last Treatment Date 07/18/2022 7/18/2022  8:37 AM   Final   • Course Elapsed Days 07/18/2022 35   Final   • Reference Point ID 07/18/2022 Lt Chestwall   Final   • Reference Point Dosage Given to Da* 07/18/2022 [id number]  Gy Final   • Plan ID 07/18/2022 [facility] [id number]   Final   • Plan Name 07/18/2022 [facility]  [id number]   Final   • Plan Fractions Treated to Date 07/18/2022 2   Final   • Plan Total Fractions Prescribed 07/18/2022 25   Final   • Plan Prescribed Dose Per Fraction 07/18/2022 2  Gy Final   • Plan Total Prescribed Dose 07/18/2022 5,000  cGy Final   • Plan Primary Reference Point 07/18/2022 Lt Chestwall   Final   • Plan ID 07/18/2022 [email or web address] Adj   Final   • Plan Name 07/18/2022 DIBHLtCW Adj   Final   • Plan Fractions Treated to Date 07/18/2022 23   Final   • Plan Total Fractions Prescribed 07/18/2022 23   Final   • Plan Prescribed Dose Per Fraction 07/18/2022 2  Gy Final   • Plan Total Prescribed Dose 07/18/2022 4,600  cGy Final   • Plan Primary Reference Point 07/18/2022 Lt Chestwall   Final        No radiology results for the last 30 days.       Assessment & Plan       *Left breast invasive lobular carcinoma  · pT3 multifocal N0 M0  · Clinical prognostic stage II, pathological prognostic stage I.   2 foci of invasive lobular carcinoma noted 1 was 60 mm and the second was 3.5 mm.  · Margins negative  · Both the tumors were strongly ER/ID positive HER2 negative with a low Ki-67, grade 1  · Oncotype DX recurrence score 12 with a 3% risk of distant metastasis with AI or tamoxifen alone and less than 1% benefit from chemotherapy  · Discussed the results of Oncotype DX recurrence score with patient.  · Completed adjuvant radiation to the left breast.  · Started anastrozole July 2022  · Tolerating that well.  · No evidence of recurrent disease    *Bone health  · Patient had a recent DEXA scan the results of which are not available to us.  · [facility]    *Hypertension  · Blood pressure somewhat elevated at 153/80  · Continue current medication    *Family history of breast cancer  · Genetic testing performed and negative for any pathogenic mutation.    *Follow-up-3 to 4 months

## 2022-08-19 NOTE — THERAPY RE-EVALUATION
Physical Therapy Lymphedema Re-Evaluation  [facility]     Patient Name: Mary Ann Michaels  : 1955  MRN: [id number]  Today's Date: 2022      Visit Date: 2022    Visit Dx:    ICD-10-CM ICD-9-CM   1. S/P mastectomy, left  Z90.12 V45.71   2. At risk for lymphedema  Z91.89 V49.89   3. Malignant neoplasm of upper-outer quadrant of left breast in female, estrogen receptor positive (HCC)  C50.412 174.4    Z17.0 V86.0       Patient Active Problem List   Diagnosis   • Breast cancer of upper-outer quadrant of left female breast (HCC)        Past Medical History:   Diagnosis Date   • Anxiety and depression    • Breast cancer (HCC)    • Female stress incontinence    • Hiatal hernia    • History of left breast cancer    • Hypertension    • Insomnia    • Sleep apnea     CPAP        Past Surgical History:   Procedure Laterality Date   • BREAST RECONSTRUCTION Left 3/30/2022    Procedure: LEFT PLACEMENT TISSUE EXPANDER AND ALLODERM;  Surgeon: Raj Montgomery MD;  Location: [facility];  Service: Plastics;  Laterality: Left;   •  SECTION     • CHOLECYSTECTOMY     • HYSTERECTOMY     • KNEE SURGERY Right     ARTHROSCOPY TENDON REPAIR   • MASTECTOMY W/ SENTINEL NODE BIOPSY Left 3/30/2022    Procedure: left total mastectomy with left axillary sentinel lymph node biopsy;  Surgeon: Catherine Isaac MD;  Location: [facility];  Service: General;  Laterality: Left;       Visit Dx:    ICD-10-CM ICD-9-CM   1. S/P mastectomy, left  Z90.12 V45.71   2. At risk for lymphedema  Z91.89 V49.89   3. Malignant neoplasm of upper-outer quadrant of left breast in female, estrogen receptor positive (HCC)  C50.412 174.4    Z17.0 V86.0            Lymphedema     Row Name 22 0900             Subjective Pain    Able to rate subjective pain? yes  -LB      Pre-Treatment Pain Level 0  -LB              Subjective Comments    Subjective Comments I am doing well. No issues. My skin is healing well. I am still  moisturizing it. I think the sleeve fits. My shoulder is doing fine. I am back to work.  -LB              Lymphedema Assessment    Lymphedema Classification LUE:;at risk/stage 0  -LB      Lymphedema Cancer Related Sx left;radical mastectomy;lumpectomy  -LB      Lymph Nodes Removed # 7  -LB      Positive Lymph Nodes # 0  -LB      Chemo Received no  -LB      Radiation Therapy Received yes  upcoming  -LB      Infections or Cellulitis? yes  currently on antibiotics  -LB              L-Dex Bioimpedence Screening    L-Dex Measurement Extremity LUE  -LB      L-Dex Patient Position Standing  -LB      L-Dex UE Dominate Side Right  -LB      L-Dex UE At Risk Side Left  -LB      L-Dex UE Pre Surgical Value No  -LB      L-Dex UE Score 4.7  -LB      L-Dex UE Baseline Score 1.5  -LB      L-Dex UE Value Change 3.2  -LB      L-Dex UE Comment WNL  -LB      $ L-Dex Charge yes  -LB            User Key  (r) = Recorded By, (t) = Taken By, (c) = Cosigned By    Initials Name Provider Type    Cheli Devlin, HERMINIO Physical Therapist                                Therapy Education  Education Details: discussed bioimpedance results, compression garment wear, reviewed s/s of lymphedema  Given: Symptoms/condition management, Edema management  Program: Reinforced  How Provided: Verbal  Provided to: Patient  Level of Understanding: Verbalized       OP Exercises     Row Name 08/19/22 0900             Subjective Comments    Subjective Comments I am doing well. No issues. My skin is healing well. I am still moisturizing it. I think the sleeve fits. My shoulder is doing fine. I am back to work.  -LB              Subjective Pain    Able to rate subjective pain? yes  -LB      Pre-Treatment Pain Level 0  -LB            User Key  (r) = Recorded By, (t) = Taken By, (c) = Cosigned By    Initials Name Provider Type    Cheli Devlin PT Physical Therapist                             PT OP Goals     Row Name 08/19/22 0900          Long Term Goals    LTG  Date to Achieve 06/12/22  -LB     LTG 1 Pt will maintain LDex bioimpedance WNL.  -LB     LTG 1 Progress Ongoing  -LB     LTG 1 Progress Comments WNL today at 4.7  -LB     LTG 2 Pt will acquire appropriately fitted compression garment and verbalize appropriate wear schedule.  -LB     LTG 2 Progress Met  -LB     LTG 2 Progress Comments Pt donned today; well fitted.  -LB     LTG 3 Pt will demonstrate grossly 120 deg flexion/abduction LUE to improve her tolerane to radiation.  -LB     LTG 3 Progress Ongoing  -LB     LTG 3 Progress Comments Pt reports full function, did not demonstrate.  -LB     LTG 4 Pt will report reduced tightness and discomfort L axilla to minimal.  -LB     LTG 4 Progress Ongoing  -LB     LTG 4 Progress Comments Pt denies issues in L axilla today.  -LB           User Key  (r) = Recorded By, (t) = Taken By, (c) = Cosigned By    Initials Name Provider Type    Cheli Devlin, HERMINIO Physical Therapist                 PT Assessment/Plan     Row Name 08/19/22 0918          PT Assessment    Functional Limitations Other (comment)  at risk for lymphedema  -LB     Impairments Impaired flexibility;Impaired lymphatic circulation;Posture;Range of motion;Sensation  -LB     Assessment Comments Mary Ann Michaels is a 67 y.o. female who returns for 3 month f/u biompedance spectroscopy diagnostic assessment of lymphatics of the upper extremities due to increased risk of post mastectomy lymphedema Left upper extremity due to history of s/p Left Modified Radical Mastectomy with [place] Node Biopsy with immediate reconstruction prepectoral tissue expander performed on 3/30/22 by surgeons Dr. Isaac/Ulises. Mary Ann Michaels   is at increased risk of post mastectomy lymphedema syndrome Left Upper Extremity due to Radiation therapy Breast surgery Lymph Node Removal. Current L-Dex score is 4.5, which is WNL. Due to good progress, patient is to return in 3 months to reassess bioimpedance scoring unless otherwise indicated.  Mary Ann Michaels  was encouraged to contact me during this time with any questions, concerns or changes in symptoms.  -LB     Rehab Potential Good  -LB     Patient/caregiver participated in establishment of treatment plan and goals Yes  -LB     Patient would benefit from skilled therapy intervention Yes  -LB            PT Plan    PT Frequency Other (comment)  -LB     Predicted Duration of Therapy Intervention (PT) repeat bioimpedance in 3 months  -LB     Planned CPT's? PT RE-EVAL: 15356;PT THER PROC EA 15 MIN: 69147;PT THER ACT EA 15 MIN: 64192;PT MANUAL THERAPY EA 15 MIN: 18230;PT SELF CARE/HOME MGMT/TRAIN EA 15: 06655;PT EVAL LOW COMPLEXITY: 12900;PT BIS XTRACELL FLUID ANALYSIS: 82418  -LB     PT Plan Comments repeat bioimpedane in 3 months  -LB           User Key  (r) = Recorded By, (t) = Taken By, (c) = Cosigned By    Initials Name Provider Type    Cheli Devlin, PT Physical Therapist                             Time Calculation:   Start Time: 0915  Stop Time: 0930  Time Calculation (min): 15 min   Therapy Charges for Today     Code Description Service Date Service Provider Modifiers Qty    95524840557  PT BIS XTRACELL FLUID ANALYSIS 8/19/2022 Cheli Tracey, PT  1                    Cheli Tracey PT  8/19/2022

## 2022-09-21 ENCOUNTER — TELEPHONE (OUTPATIENT)
Dept: SURGERY | Facility: CLINIC | Age: 67
End: 2022-09-21

## 2022-09-21 NOTE — TELEPHONE ENCOUNTER
Due to scheduling conflict pt appt moved to Fri 12/16/2022 at 1:00 pm, arrival 12:45 pm.    Unable to speak with pt. Left a voicemail.    Pt to call back and confirm.    MEB

## 2022-11-02 ENCOUNTER — HOSPITAL ENCOUNTER (OUTPATIENT)
Dept: PHYSICAL THERAPY | Facility: HOSPITAL | Age: 67
Setting detail: THERAPIES SERIES
Discharge: HOME | End: 2022-11-02

## 2022-11-02 DIAGNOSIS — C50.412 MALIGNANT NEOPLASM OF UPPER-OUTER QUADRANT OF LEFT BREAST IN FEMALE, ESTROGEN RECEPTOR POSITIVE: ICD-10-CM

## 2022-11-02 DIAGNOSIS — Z90.12 S/P MASTECTOMY, LEFT: Primary | ICD-10-CM

## 2022-11-02 DIAGNOSIS — Z91.89 AT RISK FOR LYMPHEDEMA: ICD-10-CM

## 2022-11-02 DIAGNOSIS — Z17.0 MALIGNANT NEOPLASM OF UPPER-OUTER QUADRANT OF LEFT BREAST IN FEMALE, ESTROGEN RECEPTOR POSITIVE: ICD-10-CM

## 2022-11-02 PROCEDURE — 93702 BIS XTRACELL FLUID ANALYSIS: CPT

## 2022-11-02 PROCEDURE — 97164 PT RE-EVAL EST PLAN CARE: CPT

## 2022-11-02 NOTE — THERAPY RE-EVALUATION
Physical Therapy Lymphedema Re-Evaluation  [facility]     Patient Name: Mary Ann Michaels  : 1955  MRN: [id number]  Today's Date: 2022      Visit Date: 2022    Visit Dx:    ICD-10-CM ICD-9-CM   1. S/P mastectomy, left  Z90.12 V45.71   2. At risk for lymphedema  Z91.89 V49.89   3. Malignant neoplasm of upper-outer quadrant of left breast in female, estrogen receptor positive (HCC)  C50.412 174.4    Z17.0 V86.0       Patient Active Problem List   Diagnosis   • Breast cancer of upper-outer quadrant of left female breast (HCC)        Past Medical History:   Diagnosis Date   • Anxiety and depression    • Breast cancer (HCC)    • Female stress incontinence    • Hiatal hernia    • History of left breast cancer    • Hypertension    • Insomnia    • Sleep apnea     CPAP        Past Surgical History:   Procedure Laterality Date   • BREAST RECONSTRUCTION Left 3/30/2022    Procedure: LEFT PLACEMENT TISSUE EXPANDER AND ALLODERM;  Surgeon: Raj Montgomery MD;  Location: [facility];  Service: Plastics;  Laterality: Left;   •  SECTION     • CHOLECYSTECTOMY     • HYSTERECTOMY     • KNEE SURGERY Right     ARTHROSCOPY TENDON REPAIR   • MASTECTOMY W/ SENTINEL NODE BIOPSY Left 3/30/2022    Procedure: left total mastectomy with left axillary sentinel lymph node biopsy;  Surgeon: Catherine Isaac MD;  Location: [facility];  Service: General;  Laterality: Left;       Visit Dx:    ICD-10-CM ICD-9-CM   1. S/P mastectomy, left  Z90.12 V45.71   2. At risk for lymphedema  Z91.89 V49.89   3. Malignant neoplasm of upper-outer quadrant of left breast in female, estrogen receptor positive (HCC)  C50.412 174.4    Z17.0 V86.0            Lymphedema     Row Name 22 1500             Subjective Pain    Able to rate subjective pain? yes  -LB      Pre-Treatment Pain Level 0  -LB         Subjective Comments    Subjective Comments I am doing well. I haven't had any issues. My L shoulder is a little sore  at the top of it.  -LB         Lymphedema Assessment    Lymphedema Classification LUE:;at risk/stage 0  -LB      Lymphedema Cancer Related Sx left;radical mastectomy;lumpectomy  -LB      Lymph Nodes Removed # 7  -LB      Positive Lymph Nodes # 0  -LB      Chemo Received no  -LB      Radiation Therapy Received yes  -LB      Infections or Cellulitis? yes  -LB         Lymphedema Edema Assessment    Edema Assessment Comment no obvious edema  -LB         LUE Quick Girth (cm)    Axilla 53.5 cm  -LB      Mid upper arm 44.2 cm  -LB      Elbow 28 cm  -LB      Mid forearm 27 cm  -LB      Wrist crease 19.1 cm  -LB      LUE Quick Girth Total 171.8  -LB         RUE Quick Girth (cm)    Axilla 47.5 cm  -LB      Mid upper arm 45 cm  -LB      Elbow 38 cm  -LB      Mid forearm 28.4 cm  -LB      Wrist crease 18.5 cm  -LB      RUE Quick Girth Total 177.4  -LB         L-Dex Bioimpedence Screening    L-Dex Measurement Extremity LUE  -LB      L-Dex Patient Position Standing  -LB      L-Dex UE Dominate Side Right  -LB      L-Dex UE At Risk Side Left  -LB      L-Dex UE Pre Surgical Value No  -LB      L-Dex UE Score 6.4  -LB      L-Dex UE Baseline Score 1.5  -LB      L-Dex UE Value Change 4.9  -LB      L-Dex UE Comment WNL; elevated from last reading  high quality reading BUEs, LLE, medium quality RLE through 5 attempts  -LB      $ L-Dex Charge yes  -LB            User Key  (r) = Recorded By, (t) = Taken By, (c) = Cosigned By    Initials Name Provider Type    LB Cheli Tracey PT Physical Therapist                                Therapy Education  Education Details: discussed bioimpedance results and interpretation, compression garment use as tolerated, reduced use of LUE for pushing out of a chair  Given: Symptoms/condition management, Edema management  Program: Reinforced  How Provided: Verbal  Provided to: Patient  Level of Understanding: Verbalized       OP Exercises     Row Name 11/02/22 1500             Subjective Comments     Subjective Comments I am doing well. I haven't had any issues. My L shoulder is a little sore at the top of it.  -LB         Subjective Pain    Able to rate subjective pain? yes  -LB      Pre-Treatment Pain Level 0  -LB            User Key  (r) = Recorded By, (t) = Taken By, (c) = Cosigned By    Initials Name Provider Type    LB Cheli Tracey, PT Physical Therapist                             PT OP Goals     Row Name 11/02/22 1500          Long Term Goals    LTG Date to Achieve 06/12/22  -LB     LTG 1 Pt will maintain LDex bioimpedance WNL.  -LB     LTG 1 Progress Ongoing  -LB     LTG 1 Progress Comments WNL today but slightly elevated at 6.4  -LB     LTG 2 Pt will acquire appropriately fitted compression garment and verbalize appropriate wear schedule.  -LB     LTG 2 Progress Met  -LB     LTG 3 Pt will demonstrate grossly 120 deg flexion/abduction LUE to improve her tolerane to radiation.  -LB     LTG 3 Progress Met  -LB     LTG 4 Pt will report reduced tightness and discomfort L axilla to minimal.  -LB     LTG 4 Progress Met  -LB           User Key  (r) = Recorded By, (t) = Taken By, (c) = Cosigned By    Initials Name Provider Type    Cheli Devlin, PT Physical Therapist                 PT Assessment/Plan     Row Name 11/02/22 1517          PT Assessment    Functional Limitations Other (comment)  at risk for lymphedema  -LB     Impairments Impaired flexibility;Impaired lymphatic circulation;Posture;Range of motion;Sensation  -LB     Assessment Comments Mary Ann Michaels is a 67 y.o. female who returns for 2 month f/u biompedance spectroscopy diagnostic assessment of lymphatics of the upper extremities due to increased risk of post mastectomy lymphedema Left upper extremity due to history of s/p Left Modified Radical Mastectomy with [place] Node Biopsy with immediate reconstruction prepectoral tissue expander performed on 3/30/22 by surgeons Dr. Isaac/Ulises. Mary Ann Michaels is at increased risk of post  mastectomy lymphedema syndrome Left Upper Extremity due to Radiation therapy Breast surgery Lymph Node Removal. Current L-Dex score is 6.4, which is WNL. It is slightly elevated compared to last reading. We discussed returning to compression garment use as tolerated, monitoring for symptoms, and decreasing use of LUE for heavy lifting/pushing/pulling. We will return in 2 months to assess response to management. Pt does also report intermittent L shoulder pain that may be affecting results to some degree.  -LB     Rehab Potential Good  -LB     Patient/caregiver participated in establishment of treatment plan and goals Yes  -LB        PT Plan    PT Frequency Other (comment)  -LB     Predicted Duration of Therapy Intervention (PT) repeat bioimpedance in 2 months  -LB     Planned CPT's? PT RE-EVAL: 98926;PT THER PROC EA 15 MIN: 91291;PT THER ACT EA 15 MIN: 58671;PT MANUAL THERAPY EA 15 MIN: 96404;PT NEUROMUSC RE-EDUCATION EA 15 MIN: 92635;PT SELF CARE/HOME MGMT/TRAIN EA 15: 59398;PT BIS XTRACELL FLUID ANALYSIS: 50083  -LB     PT Plan Comments repeat bioimpedance in 2 months  -LB           User Key  (r) = Recorded By, (t) = Taken By, (c) = Cosigned By    Initials Name Provider Type    LB Cheli Tracey, HERMINIO Physical Therapist                             Time Calculation:   Start Time: 1445  Stop Time: 1515  Time Calculation (min): 30 min   Therapy Charges for Today     Code Description Service Date Service Provider Modifiers Qty    49332901055 HC PT BIS XTRACELL FLUID ANALYSIS 11/2/2022 Cheli Tracey, PT  1    [id number] HC PT RE-EVAL ESTABLISHED PLAN 2 11/2/2022 Cheli Tracey PT GP 1                    Cheli Tracey PT  11/2/2022

## 2022-11-28 RX ORDER — ANASTROZOLE 1 MG/1
1 TABLET ORAL DAILY
Qty: 30 TABLET | Refills: 3 | Status: CANCELLED | OUTPATIENT
Start: 2022-11-28

## 2022-11-28 RX ORDER — ANASTROZOLE 1 MG/1
1 TABLET ORAL DAILY
Qty: 30 TABLET | Refills: 3 | Status: SHIPPED | OUTPATIENT
Start: 2022-11-28 | End: 2023-01-05 | Stop reason: SDUPTHER

## 2022-12-14 ENCOUNTER — TELEPHONE (OUTPATIENT)
Dept: SURGERY | Facility: HOSPITAL | Age: 67
End: 2022-12-14

## 2022-12-14 NOTE — TELEPHONE ENCOUNTER
[facility] December 12, 2022 right screening mammogram with tomosynthesis.  Scattered areas of fibroglandular density.  BI-RADS 1

## 2022-12-16 DIAGNOSIS — Z17.0 MALIGNANT NEOPLASM OF UPPER-OUTER QUADRANT OF LEFT BREAST IN FEMALE, ESTROGEN RECEPTOR POSITIVE: Primary | ICD-10-CM

## 2022-12-16 DIAGNOSIS — C50.412 MALIGNANT NEOPLASM OF UPPER-OUTER QUADRANT OF LEFT BREAST IN FEMALE, ESTROGEN RECEPTOR POSITIVE: Primary | ICD-10-CM

## 2023-01-03 ENCOUNTER — TELEPHONE (OUTPATIENT)
Dept: SURGERY | Facility: CLINIC | Age: 68
End: 2023-01-03
Payer: COMMERCIAL

## 2023-01-03 NOTE — TELEPHONE ENCOUNTER
Called to offer pt sooner appt.    Unable to speak with pt. Lvm.    Pt to call back and confirm.    MEB

## 2023-01-04 ENCOUNTER — TELEPHONE (OUTPATIENT)
Dept: SURGERY | Facility: CLINIC | Age: 68
End: 2023-01-04
Payer: COMMERCIAL

## 2023-01-04 NOTE — TELEPHONE ENCOUNTER
Called pt to offer sooner appt.    Unable to speak with pt. Lvm.    Pt to call back and confirm.    MEB

## 2023-01-05 ENCOUNTER — TELEPHONE (OUTPATIENT)
Dept: ONCOLOGY | Facility: CLINIC | Age: 68
End: 2023-01-05
Payer: MEDICARE

## 2023-01-05 RX ORDER — ANASTROZOLE 1 MG/1
1 TABLET ORAL DAILY
Qty: 30 TABLET | Refills: 3 | Status: SHIPPED | OUTPATIENT
Start: 2023-01-05

## 2023-01-05 NOTE — TELEPHONE ENCOUNTER
Caller: Mary Ann Michaels    Relationship: Self    Best call back number: [phone]    Requested Prescriptions:   Requested Prescriptions     Pending Prescriptions Disp Refills   • anastrozole (Arimidex) 1 MG tablet 30 tablet 3     Sig: Take 1 tablet by mouth Daily.        Pharmacy where request should be sent: [facility] [id number], [place] - [address] - [phone]  - [phone] FX     Additional details provided by patient: SHE IS COMPLETELY OUT.    Does the patient have less than a 3 day supply:  [x] Yes  [] No    Would you like a call back once the refill request has been completed: [x] Yes [] No    If the office needs to give you a call back, can they leave a voicemail: [x] Yes [] No    Antonio Carlson Rep   01/05/23 15:11 EST

## 2023-01-05 NOTE — TELEPHONE ENCOUNTER
Caller: Mary Ann Michaels    Relationship to patient: Self    Best call back number: [phone]    Type of visit: LAB AND FOLLOW UP    Requested date: ANY    If rescheduling, when is the original appointment: 12/16    Additional notes: PLEASE CALL ONCE R/S. HUB UNABLE TO R/S WITHIN TIMEFRAME.

## 2023-01-12 ENCOUNTER — TELEPHONE (OUTPATIENT)
Dept: SURGERY | Facility: CLINIC | Age: 68
End: 2023-01-12
Payer: MEDICARE

## 2023-01-12 NOTE — TELEPHONE ENCOUNTER
Pt appt moved up to 10:30 am, 10:15 am arrival Fri 1/13/23.    Unable to speak with pt. Lvm.    Pt to call back and confirm.    MEB

## 2023-01-13 ENCOUNTER — OFFICE VISIT (OUTPATIENT)
Dept: SURGERY | Facility: CLINIC | Age: 68
End: 2023-01-13
Payer: MEDICARE

## 2023-01-13 VITALS
OXYGEN SATURATION: 95 % | DIASTOLIC BLOOD PRESSURE: 86 MMHG | BODY MASS INDEX: 45.93 KG/M2 | SYSTOLIC BLOOD PRESSURE: 134 MMHG | HEART RATE: 56 BPM | WEIGHT: 269 LBS | HEIGHT: 64 IN

## 2023-01-13 DIAGNOSIS — Z17.0 MALIGNANT NEOPLASM OF UPPER-OUTER QUADRANT OF LEFT BREAST IN FEMALE, ESTROGEN RECEPTOR POSITIVE: Primary | ICD-10-CM

## 2023-01-13 DIAGNOSIS — Z80.3 FH: BREAST CANCER: ICD-10-CM

## 2023-01-13 DIAGNOSIS — Z80.0 FH: PANCREATIC CANCER: ICD-10-CM

## 2023-01-13 DIAGNOSIS — C50.412 MALIGNANT NEOPLASM OF UPPER-OUTER QUADRANT OF LEFT BREAST IN FEMALE, ESTROGEN RECEPTOR POSITIVE: Primary | ICD-10-CM

## 2023-01-13 DIAGNOSIS — E66.01 MORBID (SEVERE) OBESITY DUE TO EXCESS CALORIES: ICD-10-CM

## 2023-01-13 DIAGNOSIS — R92.8 ABNORMAL MRI, BREAST: ICD-10-CM

## 2023-01-13 DIAGNOSIS — Z12.31 ENCOUNTER FOR SCREENING MAMMOGRAM FOR MALIGNANT NEOPLASM OF BREAST: ICD-10-CM

## 2023-01-13 DIAGNOSIS — Z80.41 FH: OVARIAN CANCER: ICD-10-CM

## 2023-01-13 PROCEDURE — 99213 OFFICE O/P EST LOW 20 MIN: CPT | Performed by: SURGERY

## 2023-01-13 NOTE — TELEPHONE ENCOUNTER
Attempted to contact pt again in regards to appt change from 12 pm today moved up to 10:30 am.    Went straight to giuliana Renteria    Pt to call back and confirm.    MEB

## 2023-01-13 NOTE — PROGRESS NOTES
Chief Complaint: Rafaela Walker is a 67 y.o. female who was seen in consultation at the request of SAMANTA Rodney  for newly diagnosed breast cancer and a followup visit    History of Present Illness:  Patient presents with newly diagnosed breast cancer LEFT breast. She noted no new masses, skin changes, nipple discharge, nipple changes prior to her most recent imaging.  Her most recent imaging includes the followin2017 RIGHT BREAST US     [facility]     RAFAELA WALKER  Right breast, 10:00 position, 9 cm from the nipple, there is a 15 x 9 x 18 mm cyst. This corresponds to the mammographic abnormality.  BI-RADS 2: Benign.    PROGRESS NOTES       RAFAELA WALKER  Urinalysis.     2021 BILATERAL SCREENING MAMMO WITH LOW   [facility]    RAFAELA WALKER  Scattered areas of fibroglandular density. A previously described cyst in the 9:00 posterior right breast, best depicted on prior diagnostic ultrasound from , has significantly diminished in size. Within the left breast, there is a new single view subcentimeter focal asymmetry superiorly posterior third depth on the MLO view.  BI-RADS 0.    2021 LEFT DIAGNOSTIC MAMMO WITH LOW & LEFT BREAST US     [facility]     RAFAELA WALKER  MMG:  Scattered areas of fibroglandular density. 6 mm indistinct mass in the outer, slightly upper left breast, posterior depth.  US:  Corresponding to the left breast mass on mammogram, there is a 5 x 4 x 5 mm indistinct hypoechoic mass 2:00, 9 cm from the nipple.  BI-RADS 4.      She had a biopsy on the following day that showed:     2022 LEFT US GUIDED BREAST BIOPSY      [facility]       RAFAELA WALKER  ADDENDUM:  The imaging and mammographic findings are concordant. I would recommend consideration of diagnostic breast MRI, given the multifocality.  Left breast 2:00 position approximately 9 cm the nipple, a 5 x 4 x 3 mm hypoechoic shadowing focus was demonstrated. A 12 gauge 5 cm guide was advanced to  the periphery of this nodule. Through the guide, six 2.3 cm 14 gauge core needle specimens were obtained. A [place] clip was placed in the nodule. 2 view post procedure mammogram demonstrated that this did not represent the intended target. This is designated as lesion #1. The clip is located in the more anterior position the upper outer left breast than the target lesion. Subsequently, the left breast was prepped and draped in usual sterile fashion, with attention to more posterior position closer to the axillary tail. A hypoechoic nodule was localized in the 2:00 position 15 cm from the nipple measuring 5 x 6 x 6 mm, at this location. Four 2.3 cm 14 gauge core needle specimens were obtained. A Hydramark #4 clip was placed in this nodule. 2 view post-surgery mammogram demonstrated appropriate clip placement in the target lesion, corresponding to previous diagnostic mammogram and screening mammogram abnormality. This is designated as lesion #2.  PATHOLOGY:  IHC HER2/lelia Report, Addendum:  Specimen #1:  HER2/lelia by IHC has returned Negative (1+)  Specimen #2:  HER2/lelia by IHC has returned Negative (1+)  FINAL DIAGNOSIS:  Specimen #1:  Invasive lobular carcinoma (tubule score 3, nuclear score 1, mitotic score 1) total 5/9. Largest tumor focus is 5 mm.  ER: 100%  VT: 100%  HER2: 1+  Specimen #2 (Left breast, 2 o’clock, 9 cm from the nipple, core biopsy):  Invasive lobular carcinoma (tubule score 3, nuclear score 1, mitotic score 1) total 5/9. Largest tumor focus is 4 mm. Focal atypical lobular hyperplasia.  ER: Strong, 100%  VT: Strong, 100%  HER2: 1+  OUTSIDE PATHOLOGY CONSULTATION (1/24/22):  1. Left Breast at 2 o’clock, 15 cm from Nipple:  A. Invasive lobular carcinoma  1. Overall Kenneth grade I (tubular score 3, nuclear score 1, mitotic score 1)  2. Invasive carcinoma measures at least 5 mm.  3. No lymphovascular space invasion.  B. No in situ carcinoma identified.  ER: 99%, positive  VT: 99%, positive  HER2:  Score 1+  Ki-67: 12%  2. Left Breast at 2 o’clock, 9 cm from Nipple, core biopsy:  A. Invasive lobular carcinoma  1. Overall [place] grade II (tubular score 3, nuclear score 2, mitotic score 1)  2. Invasive carcinoma measures at least 4 mm in greatest dimension.  3. No lymphovascular space invasion.  B. Focal atypical lobular hyperplasia (ALH) noted.  ER: 99%, positive  OR: 99%, positive  HER2: Score 1+  Ki-67: 13%    I then arranged for a MRI:      01/31/2022 BILATERAL BREAST MRI          [facility]         RAFAELA AARON  Left breast 2 o’clock posterior one-third 10 cm from the nipple there is a focal signal void. This corresponds to the V-shaped metallic clip. No abnormal enhancement is seen in the immediate vicinity of the V-shaped metallic clip. This metallic clip is on the order of 2.8 cm posterior to an area of clumped enhancement that measures on the order of 5.2 cm in the anterior to posterior, 4.3 cm in the medial to lateral, 3.2 cm in the superior to inferior which corresponds to an area of prominent parenchyma that can be seen on the provided mammograms in the middle third upper outer quadrant of the left breast. Additionally, there is a second more posteriorly located metallic clip at the 2 o’clock axis on the order of 14 cm from the nipple that corresponds to a coil-shaped metallic clip. This metallic clip is immediately adjacent to an enhancing lesion that measures 0.9 cm in the anterior to posterior dimension, 0.6 cm in the superior-inferior dimension and 0.6 cm in the medial to lateral dimension which represents biopsy-proven site of malignancy. No other areas of abnormal enhancement left breast. No evidence for left axillary or internal mammary chain adenopathy. There are no areas of abnormal enhancement right breast.        She has not had a breast biopsy in the past.  She has had her uterus and ovaries removed in 1999, is postmenopausal, and takes nor hormones.  Her family history includes the  following: She has 1 daughter, 1 son, 2 paternal half-sisters, no aunts on either side of the family.  Her mother had ovarian cancer at 67, her maternal grandmother had pancreas cancer at 71, 2 of her brothers had liver cancer but abused alcohol and drugs, and a daughter who had DCIS at age 49 who is a patient of Dr. Serrano's.        Pathology from left total mastectomy and sentinel lymph node biopsy dated March 30, 2022: In the left mastectomy 2 foci of invasive lobular carcinoma, lobular carcinoma in situ and atypical lobular hyperplasia.  The larger focus is 6 cm.  Invasive lobular carcinoma, low-grade, 3, 1, 1, margins clear.  Smaller focus is 3.5 mm, invasive lobular, low-grade, 3, 1, 1, comes to within 1 mm of the anterior skin surface that was removed.  Therefore this was not a true margin.  All other margins are widely clear.  0 of 7 sentinel nodes.  Pathologic stage MPT3N0 stage IIb.    She had her last drain removed yesterday.  Denies redness, warmth, drainage from incision.    Interval History:  In the interim,  Mary Ann Michaels  has done well.  Oncotype returned as 4-28-22 score of 12.  She then took radiation with Dr Cook chest wall no boost from June 2022 through July 18, 2022.  She has seen PT and bioimpedence have been stable per her report.  She saw Dr. Dhillon and started anastrozole July 2022.      She has noted no  new masses, skin changes, nipple changes, nipple dischargeRIGHT breast.   She reports hyperpigmentation of the LEFT breast from radiation.    She denies headache, bone pain, belly pain, cough, changes in vision or gait.  Her most recent imaging includes the following:  [facility] December 12, 2022 right screening mammogram with tomosynthesis: Scattered areas of fibroglandular density.  BI-RADS 1.    She has had intentional 15# weight loss.    Review of Systems:  Review of Systems   Eyes: Eye problems: cataracts.   All other systems reviewed and are negative.       Past Medical and  Surgical History:  Breast Biopsy History:  Patient had not had a breast biopsy prior to her cancer diagnosis.  Breast Cancer HIstory:  Patient does not have a past medical history of breast cancer.  Breast Operations, and year:  None   Obstetric/Gynecologic History:  Age menstrual periods began: 11  Patient is postmenopausal due to removal of her uterus and both ovaries in the following year: age 39   Number of pregnancies: 3  Number of live births: 2  Number of abortions or miscarriages: 1  Age of delivery of first child: 18  Patient did not breast feed.  Length of time taking birth control pills: 3 yrs   Patient has never taken hormone replacement    Patient had both ovaries and uterus removed.   Past Surgical History:   Procedure Laterality Date   • BREAST RECONSTRUCTION Left 3/30/2022    Procedure: LEFT PLACEMENT TISSUE EXPANDER AND ALLODERM;  Surgeon: Raj Montgomery MD;  Location: [facility];  Service: Plastics;  Laterality: Left;   •  SECTION     • CHOLECYSTECTOMY     • HYSTERECTOMY     • KNEE SURGERY Right     ARTHROSCOPY TENDON REPAIR   • MASTECTOMY W/ SENTINEL NODE BIOPSY Left 3/30/2022    Procedure: left total mastectomy with left axillary sentinel lymph node biopsy;  Surgeon: Catherine Isaac MD;  Location: [facility];  Service: General;  Laterality: Left;     Past Medical History:   Diagnosis Date   • Anxiety and depression    • Breast cancer (HCC)    • Female stress incontinence    • Hiatal hernia    • History of left breast cancer    • Hypertension    • Insomnia    • Sleep apnea     CPAP       Prior Hospitalizations, other than for surgery or childbirth, and year:  None     Social History     Socioeconomic History   • Marital status:    Tobacco Use   • Smoking status: Never   • Smokeless tobacco: Never   Vaping Use   • Vaping Use: Never used   Substance and Sexual Activity   • Alcohol use: Never   • Drug use: Never   • Sexual activity: Defer     Patient is  ".  Patient is employed full time with the following occupation: nurse    Patient drinks 1 servings of caffeine per day.    Family History:  Family History   Problem Relation Age of Onset   • Ovarian cancer Mother 67   • Lung cancer Father    • Liver cancer Brother    • Liver cancer Brother    • Pancreatic cancer Maternal Grandmother    • Malig Hyperthermia Neg Hx        Vital Signs:  /86   Pulse 56   Ht 163.8 cm (64.49\")   Wt 122 kg (269 lb)   LMP  (LMP Unknown)   SpO2 95%   BMI 45.48 kg/m²      Medications:    Current Outpatient Medications:   •  anastrozole (Arimidex) 1 MG tablet, Take 1 tablet by mouth Daily., Disp: 30 tablet, Rfl: 3  •  Calcium Carbonate-Vit D-Min (CALCIUM 1200 PO), Take  by mouth., Disp: , Rfl:   •  Cholecalciferol 25 MCG (1000 UT) capsule, , Disp: , Rfl:   •  citalopram (CeleXA) 40 MG tablet, Take 1 tablet by mouth daily. (Patient taking differently: Take 40 mg by mouth Every Night.), Disp: 30 tablet, Rfl: 5  •  propranolol LA (INDERAL LA) 160 MG 24 hr capsule, Take 1 capsule by mouth Every Night., Disp: 90 capsule, Rfl: 3  •  spironolactone (ALDACTONE) 25 MG tablet, Take 1 tablet by mouth daily., Disp: 90 tablet, Rfl: 1  •  zolpidem (AMBIEN) 5 MG tablet, Take 1 tablet by mouth nightly as needed for Sleep., Disp: 30 tablet, Rfl: 2  •  mometasone (Elocon) 0.1 % cream, Apply to affect skin 2-3 times daily during radiation therapy course as needed (itching/irritation), Disp: 45 g, Rfl: 0  •  sulfamethoxazole-trimethoprim (Bactrim DS) 800-160 MG per tablet, Take 1 tablet every 12 hours by oral route for 10 days., Disp: 20 tablet, Rfl: 0  •  zolpidem (AMBIEN) 5 MG tablet, Take 1 tablet by mouth nightly as needed for Sleep, Disp: 30 tablet, Rfl: 2     Allergies:  No Known Allergies    Physical Examination:  /86   Pulse 56   Ht 163.8 cm (64.49\")   Wt 122 kg (269 lb)   LMP  (LMP Unknown)   SpO2 95%   BMI 45.48 kg/m²   General Appearance:  Patient is in no " distress.  She is well kept and has an morbidly obese build.   Psychiatric:  Patient with appropriate mood and affect. Alert and oriented to self, time, and place.    Breast, RIGHT:  large sized, asymmetric with the contralateral side as below.  Breast skin is without erythema, edema, rashes.  There are no visible abnormalities upon inspection during the arm-raising maneuver or with hands on hips in the sitting position. There is no nipple retraction, discharge or nipple/areolar skin changes.There are no masses palpable in the sitting or supine positions.    Breast, LEFT: Surgically absent with well healed transverse incision and a fully expanded tissue expander. No skin nodules. There is hyperpigmentation from irradiation.      Lymphatic:  There is no axillary, cervical, infraclavicular, or supraclavicular adenopathy bilaterally.  Eyes:  Pupils are round and reactive to light.  Cardiovascular:  Heart rate and rhythm are regular.  Respiratory:  Lungs are clear bilaterally with no crackles or wheezes in any lung field.  Gastrointestinal:  Abdomen is soft, nondistended, and nontender.     Musculoskeletal:  Good strength in all 4 extremities.   There is good range of motion in both shoulders.    Skin:  No new skin lesions or rashes on the skin excluding the breast (see breast exam above).        Imagin2016 BILATERAL SCREENING MAMMO WITH LOW    [facility]    RAFAELA WALKER  Scattered areas fibroglandular density.  BI-RADS 1: Negative.    2017 BILATERAL SCREENING MAMMO WITH LOW     [facility]      RAFAELA WALKER  Scattered fibroglandular density. In the right breast there is a well-circumscribed lobular oval equal density mass measuring 1.6 cm.  BI-RADS 0.    2017 RIGHT BREAST US     [facility]     RAFAELA AARON  Right breast, 10:00 position, 9 cm from the nipple, there is a 15 x 9 x 18 mm cyst. This corresponds to the mammographic abnormality.  BI-RADS 2: Benign.    PROGRESS NOTES        RAFAELA WALKER  Urinalysis.     12/07/2021 BILATERAL SCREENING MAMMO WITH LOW   [facility]    RAFAELA WALKER  Scattered areas of fibroglandular density. A previously described cyst in the 9:00 posterior right breast, best depicted on prior diagnostic ultrasound from 2017, has significantly diminished in size. Within the left breast, there is a new single view subcentimeter focal asymmetry superiorly posterior third depth on the MLO view.  BI-RADS 0.    12/17/2021 LEFT DIAGNOSTIC MAMMO WITH LOW & LEFT BREAST US     [facility]IOR[facility]     RAFAELA WALKER  MMG:  Scattered areas of fibroglandular density. 6 mm indistinct mass in the outer, slightly upper left breast, posterior depth.  US:  Corresponding to the left breast mass on mammogram, there is a 5 x 4 x 5 mm indistinct hypoechoic mass 2:00, 9 cm from the nipple.  BI-RADS 4.      01/31/2022 BILATERAL BREAST MRI          [facility]         RAFAELA WALKER  Left breast 2 o’clock posterior one-third 10 cm from the nipple there is a focal signal void. This corresponds to the V-shaped metallic clip. No abnormal enhancement is seen in the immediate vicinity of the V-shaped metallic clip. This metallic clip is on the order of 2.8 cm posterior to an area of clumped enhancement that measures on the order of 5.2 cm in the anterior to posterior, 4.3 cm in the medial to lateral, 3.2 cm in the superior to inferior which corresponds to an area of prominent parenchyma that can be seen on the provided mammograms in the middle third upper outer quadrant of the left breast. Additionally, there is a second more posteriorly located metallic clip at the 2 o’clock axis on the order of 14 cm from the nipple that corresponds to a coil-shaped metallic clip. This metallic clip is immediately adjacent to an enhancing lesion that measures 0.9 cm in the anterior to posterior dimension, 0.6 cm in the superior-inferior dimension and 0.6 cm in the medial to lateral dimension which represents biopsy-proven site  of malignancy. No other areas of abnormal enhancement left breast. No evidence for left axillary or internal mammary chain adenopathy. There are no areas of abnormal enhancement right breast.    [facility] December 12, 2022 right screening mammogram with tomosynthesis.  Scattered areas of fibroglandular density.  BI-RADS 1    Pathology:    01/12/2022 LEFT US GUIDED BREAST BIOPSY      [facility]       RAFAELA WALKER  ADDENDUM:  The imaging and mammographic findings are concordant. I would recommend consideration of diagnostic breast MRI, given the multifocality.  Left breast 2:00 position approximately 9 cm the nipple, a 5 x 4 x 3 mm hypoechoic shadowing focus was demonstrated. A 12 gauge 5 cm guide was advanced to the periphery of this nodule. Through the guide, six 2.3 cm 14 gauge core needle specimens were obtained. A [place] clip was placed in the nodule. 2 view post procedure mammogram demonstrated that this did not represent the intended target. This is designated as lesion #1. The clip is located in the more anterior position the upper outer left breast than the target lesion. Subsequently, the left breast was prepped and draped in usual sterile fashion, with attention to more posterior position closer to the axillary tail. A hypoechoic nodule was localized in the 2:00 position 15 cm from the nipple measuring 5 x 6 x 6 mm, at this location. Four 2.3 cm 14 gauge core needle specimens were obtained. A Hydramark #4 clip was placed in this nodule. 2 view post-surgery mammogram demonstrated appropriate clip placement in the target lesion, corresponding to previous diagnostic mammogram and screening mammogram abnormality. This is designated as lesion #2.  PATHOLOGY:  IHC HER2/lelia Report, Addendum:  Specimen #1:  HER2/lelia by IHC has returned Negative (1+)  Specimen #2:  HER2/lelia by IHC has returned Negative (1+)  FINAL DIAGNOSIS:  Specimen #1:  Invasive lobular carcinoma (tubule score 3, nuclear score 1,  mitotic score 1) total 5/9. Largest tumor focus is 5 mm.  ER: 100%  NM: 100%  HER2: 1+  Specimen #2 (Left breast, 2 o’clock, 9 cm from the nipple, core biopsy):  Invasive lobular carcinoma (tubule score 3, nuclear score 1, mitotic score 1) total 5/9. Largest tumor focus is 4 mm. Focal atypical lobular hyperplasia.  ER: Strong, 100%  NM: Strong, 100%  HER2: 1+  OUTSIDE PATHOLOGY CONSULTATION (1/24/22):  1. Left Breast at 2 o’clock, 15 cm from Nipple:  A. Invasive lobular carcinoma  1. Overall [place] grade I (tubular score 3, nuclear score 1, mitotic score 1)  2. Invasive carcinoma measures at least 5 mm.  3. No lymphovascular space invasion.  B. No in situ carcinoma identified.  ER: 99%, positive  NM: 99%, positive  HER2: Score 1+  Ki-67: 12%  2. Left Breast at 2 o’clock, 9 cm from Nipple, core biopsy:  A. Invasive lobular carcinoma  1. Overall Kenneth grade II (tubular score 3, nuclear score 2, mitotic score 1)  2. Invasive carcinoma measures at least 4 mm in greatest dimension.  3. No lymphovascular space invasion.  B. Focal atypical lobular hyperplasia (ALH) noted.  ER: 99%, positive  NM: 99%, positive  HER2: Score 1+  Ki-67: 13%      Pathology from left total mastectomy and sentinel lymph node biopsy dated March 30, 2022: In the left mastectomy 2 foci of invasive lobular carcinoma, lobular carcinoma in situ and atypical lobular hyperplasia.  The larger focus is 6 cm.  Invasive lobular carcinoma, low-grade, 3, 1, 1, margins clear.  Smaller focus is 3.5 mm, invasive lobular, low-grade, 3, 1, 1, comes to within 1 mm of the anterior skin surface that was removed.  Therefore this was not a true margin.  All other margins are widely clear.  0 of 7 sentinel nodes.  Pathologic stage MPT3N0 stage IIb.  I     Final Diagnosis   1. Lymph Node, Left [place] Node #1, Excision (2 mm Slices):                A. Single benign lymph node.     2. Lymph Node, Left [place] Node #2, Excision (2 mm Slices):                A.  Single benign lymph node.     3. Lymph Node, Left [place] Node #3, Excision (2 mm Slices):                A. Single benign lymph node.     4. Lymph Node, Left [place] Node #4, Excision (2 mm Slices):                A. Two benign lymph nodes.     5. Lymph Node, Left [place] Node #5, Excision (2 mm Slices):                A. Two benign lymph nodes.     6. Breast, Left, Total Mastectomy: Two foci of invasive lobular carcinoma, lobular carcinoma in situ and atypical          lobular hyperplasia.               A. The largest focus of invasive lobular carcinoma.                            1. The tumor is Kenneth grade I (tubular grade 3, nuclear grade 1, mitotic grade 1).                            2. The tumor measures up to 60 mm (six consecutive 1 cm slices).                            3. The tumor is seen near both the coil clip and V-shaped clips in the area of 2 o'clock but the clips are        not in the actual tumor.   4. The tumor comes within 13 mm of the anterior skin surface, 24 mm of the posterior margin, 60 mm of        the lateral and inferior margins, 90 mm of the superior margin and 110 mm of the medial margin.  5. Microcalcifications are associated with the invasive tumor.  B. The smaller focus of invasive lobular carcinoma.               1. The focus is located in the nipple.               2. The focus measures up to 3.5 mm.   3. The focus is [place] grade I (tubular grade 3, nuclear grade 1, mitotic grade 1).   4. The invasive focus comes within 1 mm of the anterior skin surface and 100 mm of the posterior        margin, 140 mm of the superior margin, 60 mm of the inferior margin, 90 mm of the medial        margin and 130 mm of the lateral margin.                              Comment: The invasive tumor overall comes with 1 mm of the skin not a true margin, 24 mm of posterior margin,   60 mm of the inferior and lateral margins and 90 mm of the superior and medial margins.Hormone receptors were  performed on the previous case [id number] on two separate sites revealing essentially the same findings with ER positive (99%), IL positive (99%), HER2 negative at 1+ and Ki67 12-13%. Those results are reported in the synoptic report. The 3.5 mm tumor in the nipple which does appear separate will have hormone receptors reported in the Biom[employer] template.The two tumors appear identical. The tumor in the nipple was clearly separate from the other tumor, slice 11 having no tumor in it, and the tumor in the nipple being in slice 10 and the other tumor with the most medial aspect being in slice 12.      julio/pkm    Electronically signed by Kyle Samuels MD on 3/31/2022 at 1612   Synoptic Checklist      INVASIVE CARCINOMA OF THE BREAST: Resection  8th Edition - Protocol posted: 12/17/2021  INVASIVE CARCINOMA OF THE BREAST: COMPLETE EXCISION - All Specimens       SPECIMEN   Procedure   Total mastectomy    Specimen Laterality   Left    TUMOR   Tumor Site   Upper outer quadrant    Histologic Type   Invasive lobular carcinoma    Histologic Grade ([place] Histologic Score)       Glandular (Acinar) / Tubular Differentiation   Score 3    Nuclear Pleomorphism   Score 1    Mitotic Rate   Score 1    Overall Grade   Grade 1 (scores of 3, 4 or 5)    Tumor Size   Greatest dimension of largest invasive focus (Millimeters): 60 mm   Tumor Focality   Multiple foci of invasive carcinoma    Number of Foci   2            Sizes of Individual Foci (Millimeters)   60 mm and 3.5 mm mm   Ductal Carcinoma In Situ (DCIS)   Not identified    Lobular Carcinoma In Situ (LCIS)   Present            Tumor Extent   Skin is present and involved    Skin Invasion   Invasive carcinoma directly invades into the dermis or epidermis without skin ulceration (this does not change the T classification)    Skin Satellite Foci   Satellite foci not identified    Lymphovascular Invasion   Not identified    Dermal Lymphovascular Invasion   Not identified     Microcalcifications   Present in invasive carcinoma    Treatment Effect in the Breast   No known presurgical therapy    MARGINS   Margin Status for Invasive Carcinoma   All margins negative for invasive carcinoma    Distance from Invasive Carcinoma to Closest Margin   24 mm   Closest Margin(s) to Invasive Carcinoma   Posterior    Distance from Invasive Carcinoma to Anterior Margin   skin comes within 1mm of surface    Distance from Invasive Carcinoma to Posterior Margin   24 mm   Distance from Invasive Carcinoma to Superior Margin   90 mm   Distance from Invasive Carcinoma to Inferior Margin   60 mm   Distance from Invasive Carcinoma to Medial Margin   90 mm   Distance from Invasive Carcinoma to Lateral Margin   60 mm   REGIONAL LYMPH NODES   Regional Lymph Node Status   All regional lymph nodes negative for tumor    Total Number of Lymph Nodes Examined (sentinel and non-sentinel)   7    Number of [place] Nodes Examined   7    PATHOLOGIC STAGE CLASSIFICATION (pTNM, AJCC 8th Edition)   Reporting of pT, pN, and (when applicable) pM categories is based on information available to the pathologist at the time the report is issued. As per the AJCC (Chapter 1, 8th Ed.) it is the managing physician’s responsibility to establish the final pathologic stage based upon all pertinent information, including but potentially not limited to this pathology report.   TNM Descriptors   m (multiple foci of invasive carcinoma)    pT Category   pT3    pN Category   pN0    SPECIAL STUDIES           Estrogen Receptor (ER) Status   Positive (greater than 10% of cells demonstrate nuclear positivity)    Percentage of Cells with Nuclear Positivity   99 %           Progesterone Receptor (PgR) Status   Positive    Percentage of Cells with Nuclear Positivity   99 %           HER2 (by immunohistochemistry)   Negative (Score 1+)            Ki-67 Percentage of Positive Nuclei   13 %   Testing Performed on Case Number       .      Breast  Biomarker Reporting Template  Protocol posted: 11/10/2021  BREAST: BIOMARKER REPORTING TEMPLATE - All Specimens  Test(s) Performed       Estrogen Receptor (ER) Status   Positive (greater than 10% of cells demonstrate nuclear positivity)    Percentage of Cells with Nuclear Positivity   99 %   Average Intensity of Staining   Strong    Test Type   Food and Drug Administration (FDA) cleared (test / vendor): [facility]    Primary Antibody   SP1    Scoring System   Lauren    Proportion Score   5    Intensity Score   3    Total Lauren Score   8    Test(s) Performed       Progesterone Receptor (PgR) Status   Positive    Percentage of Cells with Nuclear Positivity   99 %   Average Intensity of Staining   Strong    Test Type   Food and Drug Administration (FDA) cleared (test / vendor): [facility]    Primary Antibody   1E2    Scoring System   Lauren    Proportion Score   5    Intensity Score   3    Total Lauren Score   8    Test(s) Performed       HER2 by Immunohistochemistry   Negative (Score 1+)    Test Type   Food and Drug Administration (FDA) cleared (test / vendor): [facility]    Primary Antibody   4B5    Test(s) Performed   Ki-67    Percentage of Cells with Nuclear Positivity   8 %   Cold Ischemia and Fixation Times   Meet requirements specified in latest version of the ASCO / CAP Guidelines    Cold Ischemia Time (minutes)   60 min   Fixation Time (hours)   10 hours   Testing Performed on Block Number(s)   6A    METHODS   Fixative   Formalin    Image Analysis   Not performed    Comment(s)   This is the smaller tumor           oncotype dx 4-28-22   Score of 12- distant recurrence at 9 years on AI is 3%    Labs:   [facility] genetic panel March 28, 2022 breast and gynecology, pancreas and liver.  Returned as no mutations.  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  I will let her know this morning before surgery.    Procedures:      Assessment:   Diagnosis Plan   1. Malignant neoplasm of  upper-outer quadrant of left breast in female, estrogen receptor positive (HCC)  Mammo Screening Modified With Tomosynthesis Right With CAD      2. Abnormal MRI, breast        3. Morbid (severe) obesity due to excess calories (HCC)        4. FH: breast cancer        5. FH: ovarian cancer        6. FH: pancreatic cancer        7. Encounter for screening mammogram for malignant neoplasm of breast  Mammo Screening Modified With Tomosynthesis Right With CAD      1-3  Left breast mass 1:00, 6 cm from the nipple, 6 x 8 cm on examination, MRI markers are 6 cm apart but then anterior to the 2 below  markers there is a 5 x 4.3 cm area of enhancement.  1.  Left breast 2:00, 9 cm from the nipple, 5 mm focus, Venus marker, invasive lobular carcinoma, low-grade, 3, 1, 1, 5 mm in a core, no lymphovascular invasion, no duct carcinoma in situ, estrogen 100, progesterone 100, HER-2/lelia 1+, Ki-67 12%.      Site 2.  Left breast 2:00, 15 cm in the nipple, 6 mm on imaging, hydro-Thomas marker left in good position, invasive lobular carcinoma, intermediate grade, 3, 2, 1, 4 mm in greatest dimension, focal atypical lobular hyperplasia, no lymphovascular invasion, estrogen 100, progesterone 100, HER-2/lelia 1+, Ki-67 13%.      Clinical stage T3N0 stage IIb nodes negative on examination and MRI.      Pathology from left total mastectomy and sentinel lymph node biopsy dated March 30, 2022: In the left mastectomy 2 foci of invasive lobular carcinoma, lobular carcinoma in situ and atypical lobular hyperplasia.  The larger focus is 6 cm.  Invasive lobular carcinoma, low-grade, 3, 1, 1, margins clear.  Smaller focus is 3.5 mm, invasive lobular, low-grade, 3, 1, 1, comes to within 1 mm of the anterior skin surface that was removed.  Therefore this was not a true margin.  All other margins are widely clear.  0 of 7 sentinel nodes.  Pathologic stage MPT3N0 stage IIb.    Oncotype returned as 4-28-22 score of 12.  She then took radiation with Dr Cook  chest wall no boost from June 2022 through July 18, 2022.  She has seen PT and bioimpedence have been stable per her report.  She saw Dr. Dhillon and started anastrozole July 2022.      3-  BMI 47      4-  Daughter- patient of Dr Serrano- DCIS- age 47  Mrs Michaels [facility] genetic panel March 28, 2022 breast and gynecology, pancreas and liver.  Returned as no mutations.  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     5-  Mother age 67    6--  MGM age 71        Plan:  The patient goes by Mary Ann.  She is friends with several of my old patients.  Her daughter is a patient of Dr. Serrano's.  She is here for her 10-month follow-up visit.  She is going to have her expander exchange for implant February 2, 2023 with Dr. Montgomery.  I saluted her on her weight loss.  We discussed skin care to the irradiated skin as well as manual decongestive therapy of the left arm at night.  I will arrange for 1 additional follow-up in 1 year after a right mammogram December 13, 2023 at priority.  I will have her see the nurse practitioner thereafter.  I asked her to call us with any concerns and we would be happy to see her sooner.          Catherine Isaac MD      Today I spent 20 minutes doing the following: Reviewing records, labs, outside imaging and reports in preparation for the patient visit; obtaining medical history; performing the physical exam; counseling and educating the patient and any available family or caregivers; ordering medications, tests or procedures; coordinating care with any other physicians on her care team as needed, and documenting all of the above in the medical record as well as sending communications with her other healthcare professionals.      Next Appointment:  Return for Next scheduled follow up, after imaging, with nurse practitioner.      EMR Dragon/transcription disclaimer:    Much of this encounter note is an electronic transcription/translocation of spoken  language to printed text.  The electronic translation of spoken language may permit erroneous, or at times, nonsensical words or phrases to be inadvertently transcribed.  Although I have reviewed the note from such areas, some may still exist.                Answers for HPI/ROS submitted by the patient on 4/15/2022  Please describe your symptoms.: post op mastectomy  Have you had these symptoms before?: No  How long have you been having these symptoms?: 1-2 weeks  Please list any medications you are currently taking for this condition.: gabopentin q 8hrs, tylenol prn  Please describe any probable cause for these symptoms. : post op pain  What is the primary reason for your visit?: Other

## 2023-01-30 ENCOUNTER — PRE-ADMISSION TESTING (OUTPATIENT)
Dept: PREADMISSION TESTING | Facility: HOSPITAL | Age: 68
End: 2023-01-30
Payer: MEDICARE

## 2023-01-30 VITALS
HEIGHT: 64 IN | BODY MASS INDEX: 45.31 KG/M2 | DIASTOLIC BLOOD PRESSURE: 89 MMHG | OXYGEN SATURATION: 95 % | TEMPERATURE: 98.4 F | HEART RATE: 58 BPM | RESPIRATION RATE: 16 BRPM | WEIGHT: 265.4 LBS | SYSTOLIC BLOOD PRESSURE: 175 MMHG

## 2023-01-30 LAB
ANION GAP SERPL CALCULATED.3IONS-SCNC: 7.7 MMOL/L (ref 5–15)
BUN SERPL-MCNC: 24 MG/DL (ref 8–23)
BUN/CREAT SERPL: 21.2 (ref 7–25)
CALCIUM SPEC-SCNC: 10 MG/DL (ref 8.6–10.5)
CHLORIDE SERPL-SCNC: 100 MMOL/L (ref 98–107)
CO2 SERPL-SCNC: 30.3 MMOL/L (ref 22–29)
CREAT SERPL-MCNC: 1.13 MG/DL (ref 0.57–1)
DEPRECATED RDW RBC AUTO: 43.4 FL (ref 37–54)
EGFRCR SERPLBLD CKD-EPI 2021: 53.1 ML/MIN/1.73
ERYTHROCYTE [DISTWIDTH] IN BLOOD BY AUTOMATED COUNT: 13.3 % (ref 12.3–15.4)
GLUCOSE SERPL-MCNC: 117 MG/DL (ref 65–99)
HCT VFR BLD AUTO: 44.3 % (ref 34–46.6)
HGB BLD-MCNC: 15 G/DL (ref 12–15.9)
MCH RBC QN AUTO: 29.9 PG (ref 26.6–33)
MCHC RBC AUTO-ENTMCNC: 33.9 G/DL (ref 31.5–35.7)
MCV RBC AUTO: 88.4 FL (ref 79–97)
PLATELET # BLD AUTO: 192 10*3/MM3 (ref 140–450)
PMV BLD AUTO: 11.3 FL (ref 6–12)
POTASSIUM SERPL-SCNC: 4.2 MMOL/L (ref 3.5–5.2)
QT INTERVAL: 448 MS
RBC # BLD AUTO: 5.01 10*6/MM3 (ref 3.77–5.28)
SODIUM SERPL-SCNC: 138 MMOL/L (ref 136–145)
WBC NRBC COR # BLD: 6.31 10*3/MM3 (ref 3.4–10.8)

## 2023-01-30 PROCEDURE — 36415 COLL VENOUS BLD VENIPUNCTURE: CPT

## 2023-01-30 PROCEDURE — 85027 COMPLETE CBC AUTOMATED: CPT

## 2023-01-30 PROCEDURE — 80048 BASIC METABOLIC PNL TOTAL CA: CPT

## 2023-01-30 PROCEDURE — 93005 ELECTROCARDIOGRAM TRACING: CPT

## 2023-01-30 PROCEDURE — 93010 ELECTROCARDIOGRAM REPORT: CPT | Performed by: INTERNAL MEDICINE

## 2023-02-02 ENCOUNTER — ANESTHESIA (OUTPATIENT)
Dept: PERIOP | Facility: HOSPITAL | Age: 68
End: 2023-02-02
Payer: MEDICARE

## 2023-02-02 ENCOUNTER — ANESTHESIA EVENT (OUTPATIENT)
Dept: PERIOP | Facility: HOSPITAL | Age: 68
End: 2023-02-02
Payer: MEDICARE

## 2023-02-02 ENCOUNTER — HOSPITAL ENCOUNTER (OUTPATIENT)
Facility: HOSPITAL | Age: 68
Setting detail: HOSPITAL OUTPATIENT SURGERY
Discharge: HOME | End: 2023-02-02
Attending: PLASTIC SURGERY | Admitting: PLASTIC SURGERY
Payer: MEDICARE

## 2023-02-02 VITALS
OXYGEN SATURATION: 94 % | RESPIRATION RATE: 18 BRPM | HEIGHT: 64 IN | BODY MASS INDEX: 45.24 KG/M2 | WEIGHT: 265 LBS | DIASTOLIC BLOOD PRESSURE: 77 MMHG | HEART RATE: 54 BPM | TEMPERATURE: 97.9 F | SYSTOLIC BLOOD PRESSURE: 121 MMHG

## 2023-02-02 DIAGNOSIS — Z90.12 ACQUIRED ABSENCE OF LEFT BREAST: ICD-10-CM

## 2023-02-02 PROCEDURE — 25010000002 DEXAMETHASONE SODIUM PHOSPHATE 20 MG/5ML SOLUTION: Performed by: NURSE ANESTHETIST, CERTIFIED REGISTERED

## 2023-02-02 PROCEDURE — 0 LIDOCAINE 1 % SOLUTION 20 ML VIAL: Performed by: PLASTIC SURGERY

## 2023-02-02 PROCEDURE — 25010000002 PROPOFOL 10 MG/ML EMULSION: Performed by: NURSE ANESTHETIST, CERTIFIED REGISTERED

## 2023-02-02 PROCEDURE — 25010000002 NEOSTIGMINE 5 MG/10ML SOLUTION: Performed by: NURSE ANESTHETIST, CERTIFIED REGISTERED

## 2023-02-02 PROCEDURE — C1789 PROSTHESIS, BREAST, IMP: HCPCS | Performed by: PLASTIC SURGERY

## 2023-02-02 PROCEDURE — 25010000002 ONDANSETRON PER 1 MG: Performed by: NURSE ANESTHETIST, CERTIFIED REGISTERED

## 2023-02-02 PROCEDURE — 25010000002 GENTAMICIN PER 80 MG: Performed by: PLASTIC SURGERY

## 2023-02-02 PROCEDURE — 25010000002 EPINEPHRINE PER 0.1 MG: Performed by: PLASTIC SURGERY

## 2023-02-02 PROCEDURE — 63710000001 ONDANSETRON PER 8 MG: Performed by: PLASTIC SURGERY

## 2023-02-02 PROCEDURE — 88305 TISSUE EXAM BY PATHOLOGIST: CPT | Performed by: PLASTIC SURGERY

## 2023-02-02 PROCEDURE — 25010000002 CEFAZOLIN PER 500 MG: Performed by: PLASTIC SURGERY

## 2023-02-02 PROCEDURE — 25010000002 MIDAZOLAM PER 1 MG: Performed by: ANESTHESIOLOGY

## 2023-02-02 PROCEDURE — 25010000002 FENTANYL CITRATE (PF) 50 MCG/ML SOLUTION: Performed by: NURSE ANESTHETIST, CERTIFIED REGISTERED

## 2023-02-02 DEVICE — SMOOTH MODERATE PLUS PROFILE, 545CC  SMOOTH ROUND SILICONE
Type: IMPLANTABLE DEVICE | Site: BREAST | Status: FUNCTIONAL
Brand: MENTOR MEMORYGEL BOOST BREAST IMPLANT

## 2023-02-02 DEVICE — DEV CONTRL TISS STRATAFIXSPIRALMNCRYL PLSPS2 REV4/0 45CM: Type: IMPLANTABLE DEVICE | Site: BREAST | Status: FUNCTIONAL

## 2023-02-02 RX ORDER — ROCURONIUM BROMIDE 10 MG/ML
INJECTION, SOLUTION INTRAVENOUS AS NEEDED
Status: DISCONTINUED | OUTPATIENT
Start: 2023-02-02 | End: 2023-02-02 | Stop reason: SURG

## 2023-02-02 RX ORDER — DOXYCYCLINE 100 MG/1
100 CAPSULE ORAL 2 TIMES DAILY
Qty: 10 CAPSULE | Refills: 0 | Status: SHIPPED | OUTPATIENT
Start: 2023-02-02 | End: 2023-02-07

## 2023-02-02 RX ORDER — MAGNESIUM HYDROXIDE 1200 MG/15ML
LIQUID ORAL AS NEEDED
Status: DISCONTINUED | OUTPATIENT
Start: 2023-02-02 | End: 2023-02-02 | Stop reason: HOSPADM

## 2023-02-02 RX ORDER — ONDANSETRON 2 MG/ML
4 INJECTION INTRAMUSCULAR; INTRAVENOUS ONCE AS NEEDED
Status: DISCONTINUED | OUTPATIENT
Start: 2023-02-02 | End: 2023-02-02 | Stop reason: HOSPADM

## 2023-02-02 RX ORDER — GABAPENTIN 100 MG/1
100 CAPSULE ORAL EVERY 8 HOURS
Qty: 60 CAPSULE | Refills: 2 | Status: SHIPPED | OUTPATIENT
Start: 2023-02-02

## 2023-02-02 RX ORDER — OXYCODONE AND ACETAMINOPHEN 7.5; 325 MG/1; MG/1
1 TABLET ORAL EVERY 4 HOURS PRN
Status: DISCONTINUED | OUTPATIENT
Start: 2023-02-02 | End: 2023-02-02 | Stop reason: HOSPADM

## 2023-02-02 RX ORDER — ONDANSETRON 2 MG/ML
INJECTION INTRAMUSCULAR; INTRAVENOUS AS NEEDED
Status: DISCONTINUED | OUTPATIENT
Start: 2023-02-02 | End: 2023-02-02 | Stop reason: SURG

## 2023-02-02 RX ORDER — DIPHENHYDRAMINE HYDROCHLORIDE 50 MG/ML
12.5 INJECTION INTRAMUSCULAR; INTRAVENOUS
Status: DISCONTINUED | OUTPATIENT
Start: 2023-02-02 | End: 2023-02-02 | Stop reason: HOSPADM

## 2023-02-02 RX ORDER — NALOXONE HCL 0.4 MG/ML
0.2 VIAL (ML) INJECTION AS NEEDED
Status: DISCONTINUED | OUTPATIENT
Start: 2023-02-02 | End: 2023-02-02 | Stop reason: HOSPADM

## 2023-02-02 RX ORDER — ONDANSETRON 8 MG/1
8 TABLET, ORALLY DISINTEGRATING ORAL EVERY 8 HOURS PRN
Qty: 10 TABLET | Refills: 1 | Status: SHIPPED | OUTPATIENT
Start: 2023-02-02

## 2023-02-02 RX ORDER — DOCUSATE SODIUM 250 MG
250 CAPSULE ORAL 2 TIMES DAILY PRN
Qty: 15 CAPSULE | Refills: 1 | Status: SHIPPED | OUTPATIENT
Start: 2023-02-02

## 2023-02-02 RX ORDER — FENTANYL CITRATE 50 UG/ML
50 INJECTION, SOLUTION INTRAMUSCULAR; INTRAVENOUS
Status: DISCONTINUED | OUTPATIENT
Start: 2023-02-02 | End: 2023-02-02 | Stop reason: HOSPADM

## 2023-02-02 RX ORDER — LIDOCAINE HYDROCHLORIDE 10 MG/ML
0.5 INJECTION, SOLUTION EPIDURAL; INFILTRATION; INTRACAUDAL; PERINEURAL ONCE AS NEEDED
Status: DISCONTINUED | OUTPATIENT
Start: 2023-02-02 | End: 2023-02-02 | Stop reason: HOSPADM

## 2023-02-02 RX ORDER — EPHEDRINE SULFATE 50 MG/ML
5 INJECTION, SOLUTION INTRAVENOUS ONCE AS NEEDED
Status: DISCONTINUED | OUTPATIENT
Start: 2023-02-02 | End: 2023-02-02 | Stop reason: HOSPADM

## 2023-02-02 RX ORDER — NEOSTIGMINE METHYLSULFATE 0.5 MG/ML
INJECTION, SOLUTION INTRAVENOUS AS NEEDED
Status: DISCONTINUED | OUTPATIENT
Start: 2023-02-02 | End: 2023-02-02 | Stop reason: SURG

## 2023-02-02 RX ORDER — HYDROCODONE BITARTRATE AND ACETAMINOPHEN 5; 325 MG/1; MG/1
1 TABLET ORAL ONCE AS NEEDED
Status: DISCONTINUED | OUTPATIENT
Start: 2023-02-02 | End: 2023-02-02 | Stop reason: HOSPADM

## 2023-02-02 RX ORDER — DEXAMETHASONE SODIUM PHOSPHATE 4 MG/ML
INJECTION, SOLUTION INTRA-ARTICULAR; INTRALESIONAL; INTRAMUSCULAR; INTRAVENOUS; SOFT TISSUE AS NEEDED
Status: DISCONTINUED | OUTPATIENT
Start: 2023-02-02 | End: 2023-02-02 | Stop reason: SURG

## 2023-02-02 RX ORDER — MIDAZOLAM HYDROCHLORIDE 1 MG/ML
0.5 INJECTION INTRAMUSCULAR; INTRAVENOUS
Status: DISCONTINUED | OUTPATIENT
Start: 2023-02-02 | End: 2023-02-02 | Stop reason: HOSPADM

## 2023-02-02 RX ORDER — PROMETHAZINE HYDROCHLORIDE 25 MG/1
25 SUPPOSITORY RECTAL ONCE AS NEEDED
Status: DISCONTINUED | OUTPATIENT
Start: 2023-02-02 | End: 2023-02-02 | Stop reason: HOSPADM

## 2023-02-02 RX ORDER — OXYCODONE HYDROCHLORIDE 5 MG/1
10 TABLET ORAL ONCE
Status: COMPLETED | OUTPATIENT
Start: 2023-02-02 | End: 2023-02-02

## 2023-02-02 RX ORDER — PROMETHAZINE HYDROCHLORIDE 25 MG/1
25 TABLET ORAL ONCE AS NEEDED
Status: DISCONTINUED | OUTPATIENT
Start: 2023-02-02 | End: 2023-02-02 | Stop reason: HOSPADM

## 2023-02-02 RX ORDER — HYDROCODONE BITARTRATE AND ACETAMINOPHEN 5; 325 MG/1; MG/1
1-2 TABLET ORAL EVERY 4 HOURS PRN
Qty: 15 TABLET | Refills: 0 | Status: SHIPPED | OUTPATIENT
Start: 2023-02-02

## 2023-02-02 RX ORDER — GABAPENTIN 300 MG/1
300 CAPSULE ORAL ONCE
Status: COMPLETED | OUTPATIENT
Start: 2023-02-02 | End: 2023-02-02

## 2023-02-02 RX ORDER — FLUMAZENIL 0.1 MG/ML
0.2 INJECTION INTRAVENOUS AS NEEDED
Status: DISCONTINUED | OUTPATIENT
Start: 2023-02-02 | End: 2023-02-02 | Stop reason: HOSPADM

## 2023-02-02 RX ORDER — LABETALOL HYDROCHLORIDE 5 MG/ML
5 INJECTION, SOLUTION INTRAVENOUS
Status: DISCONTINUED | OUTPATIENT
Start: 2023-02-02 | End: 2023-02-02 | Stop reason: HOSPADM

## 2023-02-02 RX ORDER — LIDOCAINE HYDROCHLORIDE 20 MG/ML
INJECTION, SOLUTION INFILTRATION; PERINEURAL AS NEEDED
Status: DISCONTINUED | OUTPATIENT
Start: 2023-02-02 | End: 2023-02-02 | Stop reason: SURG

## 2023-02-02 RX ORDER — HYDRALAZINE HYDROCHLORIDE 20 MG/ML
5 INJECTION INTRAMUSCULAR; INTRAVENOUS
Status: DISCONTINUED | OUTPATIENT
Start: 2023-02-02 | End: 2023-02-02 | Stop reason: HOSPADM

## 2023-02-02 RX ORDER — HYDROCODONE BITARTRATE AND ACETAMINOPHEN 7.5; 325 MG/1; MG/1
1 TABLET ORAL ONCE AS NEEDED
Status: COMPLETED | OUTPATIENT
Start: 2023-02-02 | End: 2023-02-02

## 2023-02-02 RX ORDER — GLYCOPYRROLATE 0.2 MG/ML
INJECTION INTRAMUSCULAR; INTRAVENOUS AS NEEDED
Status: DISCONTINUED | OUTPATIENT
Start: 2023-02-02 | End: 2023-02-02 | Stop reason: SURG

## 2023-02-02 RX ORDER — SCOLOPAMINE TRANSDERMAL SYSTEM 1 MG/1
1 PATCH, EXTENDED RELEASE TRANSDERMAL
Status: DISCONTINUED | OUTPATIENT
Start: 2023-02-02 | End: 2023-02-02 | Stop reason: HOSPADM

## 2023-02-02 RX ORDER — DIPHENHYDRAMINE HCL 25 MG
25 CAPSULE ORAL
Status: DISCONTINUED | OUTPATIENT
Start: 2023-02-02 | End: 2023-02-02 | Stop reason: HOSPADM

## 2023-02-02 RX ORDER — SODIUM CHLORIDE 0.9 % (FLUSH) 0.9 %
3 SYRINGE (ML) INJECTION EVERY 12 HOURS SCHEDULED
Status: DISCONTINUED | OUTPATIENT
Start: 2023-02-02 | End: 2023-02-02 | Stop reason: HOSPADM

## 2023-02-02 RX ORDER — SODIUM CHLORIDE 0.9 % (FLUSH) 0.9 %
3-10 SYRINGE (ML) INJECTION AS NEEDED
Status: DISCONTINUED | OUTPATIENT
Start: 2023-02-02 | End: 2023-02-02 | Stop reason: HOSPADM

## 2023-02-02 RX ORDER — SODIUM CHLORIDE, SODIUM LACTATE, POTASSIUM CHLORIDE, CALCIUM CHLORIDE 600; 310; 30; 20 MG/100ML; MG/100ML; MG/100ML; MG/100ML
125 INJECTION, SOLUTION INTRAVENOUS CONTINUOUS
Status: DISCONTINUED | OUTPATIENT
Start: 2023-02-02 | End: 2023-02-02 | Stop reason: HOSPADM

## 2023-02-02 RX ORDER — ACETAMINOPHEN 650 MG
TABLET, EXTENDED RELEASE ORAL AS NEEDED
Status: DISCONTINUED | OUTPATIENT
Start: 2023-02-02 | End: 2023-02-02 | Stop reason: HOSPADM

## 2023-02-02 RX ORDER — ACETAMINOPHEN 500 MG
1000 TABLET ORAL ONCE
Status: COMPLETED | OUTPATIENT
Start: 2023-02-02 | End: 2023-02-02

## 2023-02-02 RX ORDER — SODIUM CHLORIDE, SODIUM LACTATE, POTASSIUM CHLORIDE, CALCIUM CHLORIDE 600; 310; 30; 20 MG/100ML; MG/100ML; MG/100ML; MG/100ML
9 INJECTION, SOLUTION INTRAVENOUS CONTINUOUS
Status: DISCONTINUED | OUTPATIENT
Start: 2023-02-02 | End: 2023-02-02 | Stop reason: HOSPADM

## 2023-02-02 RX ORDER — FAMOTIDINE 10 MG/ML
20 INJECTION, SOLUTION INTRAVENOUS ONCE
Status: COMPLETED | OUTPATIENT
Start: 2023-02-02 | End: 2023-02-02

## 2023-02-02 RX ORDER — EPHEDRINE SULFATE 50 MG/ML
INJECTION INTRAVENOUS AS NEEDED
Status: DISCONTINUED | OUTPATIENT
Start: 2023-02-02 | End: 2023-02-02 | Stop reason: SURG

## 2023-02-02 RX ORDER — ONDANSETRON HYDROCHLORIDE 8 MG/1
8 TABLET, FILM COATED ORAL ONCE
Status: COMPLETED | OUTPATIENT
Start: 2023-02-02 | End: 2023-02-02

## 2023-02-02 RX ORDER — DOXYCYCLINE 100 MG/1
200 CAPSULE ORAL ONCE
Status: COMPLETED | OUTPATIENT
Start: 2023-02-02 | End: 2023-02-02

## 2023-02-02 RX ORDER — FENTANYL CITRATE 50 UG/ML
INJECTION, SOLUTION INTRAMUSCULAR; INTRAVENOUS AS NEEDED
Status: DISCONTINUED | OUTPATIENT
Start: 2023-02-02 | End: 2023-02-02 | Stop reason: SURG

## 2023-02-02 RX ORDER — CELECOXIB 200 MG/1
400 CAPSULE ORAL ONCE
Status: COMPLETED | OUTPATIENT
Start: 2023-02-02 | End: 2023-02-02

## 2023-02-02 RX ORDER — ACETAMINOPHEN 325 MG/1
325 TABLET ORAL EVERY 4 HOURS PRN
Qty: 30 TABLET | Refills: 1 | Status: SHIPPED | OUTPATIENT
Start: 2023-02-02

## 2023-02-02 RX ORDER — HYDROMORPHONE HYDROCHLORIDE 1 MG/ML
0.5 INJECTION, SOLUTION INTRAMUSCULAR; INTRAVENOUS; SUBCUTANEOUS
Status: DISCONTINUED | OUTPATIENT
Start: 2023-02-02 | End: 2023-02-02 | Stop reason: HOSPADM

## 2023-02-02 RX ORDER — AMOXICILLIN 250 MG
2 CAPSULE ORAL DAILY PRN
Qty: 30 TABLET | Refills: 1 | Status: SHIPPED | OUTPATIENT
Start: 2023-02-02 | End: 2024-02-02

## 2023-02-02 RX ORDER — PROPOFOL 10 MG/ML
VIAL (ML) INTRAVENOUS AS NEEDED
Status: DISCONTINUED | OUTPATIENT
Start: 2023-02-02 | End: 2023-02-02 | Stop reason: SURG

## 2023-02-02 RX ADMIN — ONDANSETRON HYDROCHLORIDE 8 MG: 8 TABLET, FILM COATED ORAL at 07:00

## 2023-02-02 RX ADMIN — FAMOTIDINE 20 MG: 10 INJECTION INTRAVENOUS at 07:32

## 2023-02-02 RX ADMIN — PROPOFOL 200 MG: 10 INJECTION, EMULSION INTRAVENOUS at 08:02

## 2023-02-02 RX ADMIN — CELECOXIB 400 MG: 200 CAPSULE ORAL at 07:01

## 2023-02-02 RX ADMIN — ROCURONIUM BROMIDE 20 MG: 50 INJECTION INTRAVENOUS at 09:14

## 2023-02-02 RX ADMIN — FENTANYL CITRATE 50 MCG: 50 INJECTION, SOLUTION INTRAMUSCULAR; INTRAVENOUS at 11:15

## 2023-02-02 RX ADMIN — ROCURONIUM BROMIDE 20 MG: 50 INJECTION INTRAVENOUS at 09:59

## 2023-02-02 RX ADMIN — SCOPALAMINE 1 PATCH: 1 PATCH, EXTENDED RELEASE TRANSDERMAL at 07:42

## 2023-02-02 RX ADMIN — DEXAMETHASONE SODIUM PHOSPHATE 8 MG: 4 INJECTION, SOLUTION INTRAMUSCULAR; INTRAVENOUS at 08:13

## 2023-02-02 RX ADMIN — ROCURONIUM BROMIDE 10 MG: 50 INJECTION INTRAVENOUS at 08:53

## 2023-02-02 RX ADMIN — OXYCODONE HYDROCHLORIDE 10 MG: 5 TABLET ORAL at 07:31

## 2023-02-02 RX ADMIN — NEOSTIGMINE METHYLSULFATE 3 MG: 0.5 INJECTION INTRAVENOUS at 11:13

## 2023-02-02 RX ADMIN — LIDOCAINE HYDROCHLORIDE 100 MG: 20 INJECTION, SOLUTION INFILTRATION; PERINEURAL at 08:02

## 2023-02-02 RX ADMIN — ACETAMINOPHEN 1000 MG: 500 TABLET ORAL at 07:01

## 2023-02-02 RX ADMIN — GABAPENTIN 300 MG: 300 CAPSULE ORAL at 07:31

## 2023-02-02 RX ADMIN — ROCURONIUM BROMIDE 50 MG: 50 INJECTION INTRAVENOUS at 08:02

## 2023-02-02 RX ADMIN — GLYCOPYRROLATE 0.2 MCG: 1 INJECTION INTRAMUSCULAR; INTRAVENOUS at 08:20

## 2023-02-02 RX ADMIN — EPHEDRINE SULFATE 10 MG: 50 INJECTION INTRAVENOUS at 10:07

## 2023-02-02 RX ADMIN — FENTANYL CITRATE 50 MCG: 50 INJECTION, SOLUTION INTRAMUSCULAR; INTRAVENOUS at 09:20

## 2023-02-02 RX ADMIN — HYDROCODONE BITARTRATE AND ACETAMINOPHEN 1 TABLET: 7.5; 325 TABLET ORAL at 12:23

## 2023-02-02 RX ADMIN — DOXYCYCLINE 200 MG: 100 CAPSULE ORAL at 07:31

## 2023-02-02 RX ADMIN — SODIUM CHLORIDE, POTASSIUM CHLORIDE, SODIUM LACTATE AND CALCIUM CHLORIDE 125 ML/HR: 600; 310; 30; 20 INJECTION, SOLUTION INTRAVENOUS at 07:32

## 2023-02-02 RX ADMIN — MIDAZOLAM 0.5 MG: 1 INJECTION INTRAMUSCULAR; INTRAVENOUS at 07:38

## 2023-02-02 RX ADMIN — SODIUM CHLORIDE, POTASSIUM CHLORIDE, SODIUM LACTATE AND CALCIUM CHLORIDE: 600; 310; 30; 20 INJECTION, SOLUTION INTRAVENOUS at 09:33

## 2023-02-02 RX ADMIN — GLYCOPYRROLATE 0.6 MCG: 1 INJECTION INTRAMUSCULAR; INTRAVENOUS at 11:13

## 2023-02-02 RX ADMIN — ONDANSETRON 4 MG: 2 INJECTION INTRAMUSCULAR; INTRAVENOUS at 11:15

## 2023-02-02 NOTE — ANESTHESIA PROCEDURE NOTES
Airway  Urgency: elective    Date/Time: 2/2/2023 8:06 AM  Airway not difficult    General Information and Staff    Patient location during procedure: OR  Anesthesiologist: Krishna Owens MD  CRNA/CAA: Dennis Monroy CRNA    Indications and Patient Condition  Indications for airway management: airway protection    Preoxygenated: yes  MILS maintained throughout  Mask difficulty assessment: 2 - vent by mask + OA or adjuvant +/- NMBA    Final Airway Details  Final airway type: endotracheal airway      Successful airway: ETT  Cuffed: yes   Successful intubation technique: video laryngoscopy  Facilitating devices/methods: intubating stylet  Endotracheal tube insertion site: oral  Blade: CMAC  Blade size: D  ETT size (mm): 7.5  Cormack-Lehane Classification: grade I - full view of glottis  Placement verified by: chest auscultation and capnometry   Cuff volume (mL): 8  Measured from: lips  ETT/EBT  to lips (cm): 21  Number of attempts at approach: 1  Assessment: lips, teeth, and gum same as pre-op and atraumatic intubation

## 2023-02-02 NOTE — DISCHARGE INSTRUCTIONS
OK to remove abdominal binder and change gauze in 24 hours, it is normal to have continued clear or pink drainage for 1-2 days, continue to wear binder or spanx type garment   - leave chest ace wrap in place   - ok to sponge bathe, no shower or tub bath    Scopolamine Patch  This patch has been applied to the skin behind one of your ears.  It may stay in place up to 24 hours. You may remove it at any time after your surgery; however, it should be removed after you are up and walking around the next day.  This medicine reduces stomach upset. Side effects may include: dry mouth, dizziness, sleepiness, constipation, or upset stomach.  An allergy would show up as: a rash, itching, wheezing or shortness of breath.  Follow these instructions:  Do not drink alcohol, drive or operate machinery while taking this medicine.  Wear only 1 patch at a time. You can leave the patch on for up to 24 hours.  When you remove the patch, fold it in half with the sticky sides together and throw it away. Wash your hands and the area under the patch.  Do not touch your eye with your hand if it has touched the patch.  Wash your hands well before and after touching the patch.  Sit or stand slowly to avoid dizziness.  Call your doctor if you have:  Any sign of allergy  No relief  Trouble passing urine  Any new or severe symptoms

## 2023-02-02 NOTE — ANESTHESIA PREPROCEDURE EVALUATION
Anesthesia Evaluation     Patient summary reviewed and Nursing notes reviewed                Airway   Mallampati: III  TM distance: <3 FB  Possible difficult intubation  Dental      Pulmonary    (+) sleep apnea on CPAP,   Cardiovascular     ECG reviewed  Patient on routine beta blocker and Beta blocker given within 24 hours of surgery  Rhythm: regular  Rate: normal    (+) hypertension,       Neuro/Psych  (+) psychiatric history Anxiety and Depression,    GI/Hepatic/Renal/Endo    (+)  hiatal hernia, GERD,      Musculoskeletal (-) negative ROS    Abdominal    Substance History - negative use     OB/GYN negative ob/gyn ROS         Other      history of cancer                    Anesthesia Plan    ASA 3     general     (BMI  Grade 3 airway by history  CMAC intubation    I have reviewed the patient's history with the patient and the chart, including all pertinent laboratory results and imaging. I have explained the risks of anesthesia including but not limited to dental damage, corneal abrasion, nerve injury, MI, stroke, and death. Questions asked and answered. Anesthetic plan discussed with patient and team as indicated. Patient expressed understanding of the above.  )  intravenous induction     Anesthetic plan, risks, benefits, and alternatives have been provided, discussed and informed consent has been obtained with: patient.        CODE STATUS:

## 2023-02-02 NOTE — H&P
Allergies  Reviewed Allergies  NKDA  Medications  Reviewed Medications  anastrozole 1 mg tablet  01/05/23   filled surescripts  Bactrim  mg-160 mg tablet  Take 1 tablet(s) every 12 hours by oral route for 10 days.  05/18/22   prescribed Raj Montgomery MD  citalopram 40 mg tablet  07/28/22   filled surescripts  propranoloL  mg capsule,24 hr,extended release  08/18/22   filled surescripts  spironolactone 25 mg tablet  01/08/23   filled surescripts  zolpidem 5 mg tablet  01/08/23   filled surescripts  Problems  Problems not reviewed (last reviewed 07/25/2022)  Cellulitis of breast - Onset: 05/11/2022  Family History  Family History not reviewed (last reviewed 07/25/2022)  Father - Malignant tumor of lung  Brother - Substance abuse  Social History  Social History not reviewed (last reviewed 07/25/2022)  Substance Use  Do you or have you ever smoked tobacco?: Never smoker  What is your level of alcohol consumption?: None  Surgical History  Surgical History not reviewed (last reviewed 07/25/2022)  Caesarean Section  Cholecystectomy  Knee Surgery  Total Hysterectomy  Past Medical History  Past Medical History not reviewed (last reviewed 07/25/2022)  Change In Vision: Y  Depression/Anxiety: Y  High Blood Pressure: Y  Wear Glasses/Contact Lenses: Y  Screening  None recorded.  HPI  Patient presents for pre-op evaluation. Procedure: INSERT TEXT HERE. New patient symptoms: INSERT TEXT HERE.    Continuing to feel well after her radiation therapy. She is having some discomfort in the upper pole but not noticed any skin changes or lumpiness in this area.  ROS  Patient reports change in skin color and breast lump but reports no non-healing areas. She reports no fever, no night sweats, and no significant weight gain. She reports no dry eyes and no vision change. She reports no difficulty hearing and no ear pain. She reports no frequent nosebleeds, no nose problems, and no sinus problems. She reports no sore throat,  no bleeding gums, and no dry mouth. She reports no chest pain, no arm pain on exertion, and no shortness of breath when walking. She reports no cough, no wheezing, and no shortness of breath. She reports no abdominal pain, no nausea, and no vomiting. She reports no incontinence. She reports no muscle aches, no muscle weakness, and no arthralgias/joint pain. She reports no loss of consciousness, no gait dysfunction, and no paralysis. She reports no agitation, no dementia, and no delirium.  Physical Exam  Chaperone: Chaperone: present.    Constitutional: General Appearance: healthy-appearing, well-developed, and obese. Level of Distress: NAD. Ambulation: ambulating normally.    Psychiatric: Mental Status: normal mood and affect and active and alert.    Head: Head: normocephalic and atraumatic.    Eyes: Lids and Conjunctivae: non-injected. Pupils: PERRLA. EOM: EOMI.    Cardiovascular: Heart Auscultation: RRR.    Breast: Breast Exam: Left breast expander in good position and the incision is well-healed, there is no palpable fluid collection or breakdown of the incision,Lymph edema in the breast seems to be resolving somewhat, no free fluid collection.    Musculoskeletal:: Motor Strength and Tone: normal tone and motor strength. Joints, Bones, and Muscles: normal movement of all extremities. Extremities: no cyanosis or edema.    Neurologic: Gait and Station: normal gait and station.    Skin: Inspection and palpation: no rash or lesions.  Assessment / Plan  Patient presents for pre-op evaluation. History and physical exam indicate: INSERT TEXT HERE.  We will go ahead and get her on the schedule sometime in January for her second stage. We discussed placement of the implant and some very conservative fat grafting in the left side and a significant reduction with sacrifice of the nipple areolar complex on the right side for symmetry.

## 2023-02-02 NOTE — OP NOTE
Pre-Operative Diagnosis: Acquired absence right breast     Post-Operative Diagnosis: Same     Procedure Performed:   1.  Removal left breast tissue expander placement of permanent implant ([place] SMP X-545)  2.  left breast tolerance fat grafting (100 cc)  3.  left breast capsular flap for revision of reconstruction with adjacent tissue rearrangement (60 cm²  34.  right breast reduction for symmetry     Surgeon: KIERA Montgomery MD     Assistant: None     Anesthesia: General     Estimated Blood Loss: 20     Specimens: right breast tissue 700g     Complications: None     Indications: She has completed her tissue expansion and is ready to proceed with her second stage surgery.  She has had significant capsular contracture around the expander and loss of definition to the right IM fold secondary to radiation.  We discussed replacement of the expander with smooth round silicone gel implant reviewed the consent form for this.  We also discussed significant capsular revision to lower the fold and correct the capsular contracture with a capsular flap to reconstruct the fold at the desired level.  Discussed symmetry procedure of right breast reduction.     We discussed risks, benefits and alternatives including but not limited to: bleeding, infection, asymmetry, poor or slow wound healing, need for further surgery, possible recurrence.  The patient elected to proceed.     Description of Procedure: The patient was met in the preoperative holding area.  All questions were answered and informed consent was assured.       She was marked in a standing position and breast landmarks drawn in the desired footprint of the fold was transposed from the right breast onto the left chest wall.  Abdomen was marked for fat harvest.     After induction of appropriate anesthesia, a timeout was performed correctly identifying the patient, operative site, and procedure to be performed.  All present were in agreement.     Local anesthetic was  infiltrated and chest and abdomen was prepped and draped in a sterile fashion.  The incision was made in the left breast within the lateral mastectomy incision and dissected down to the implant capsule which was significantly elevated.  The capsule was completely dissected from the lower pole and then dissected posteriorly  it from the pectoralis muscle for about 3 cm to correspond with the 3 cm of lowering of the fold that was needed.  A vertical capsulotomy was then performed and the expander evacuated and removed.  There was no periprosthetic fluid but just a little bit of fibrinous debris perhaps consistent with subclinical hematoma of the pocket causing this capsular contracture.  There was no evidence of any infection and no significant inflammatory rind on the capsule itself.  The AlloDerm appeared to be quite well integrated.     The capsular flap was then developed Utilizing this portion of the capsule that had been  from the chest wall.  It was then incised from the muscle creating a capsular flap measuring 12 x 5 cm.  This was maintained in continuity with the remaining capsule with a random pattern vascular pedicle.  Radial incisions were then made and this capsular flap in order to create multiple smaller flaps that could transposed down into the lower pole of the breast and be anchored along the desired fold to have positive control of the fold.  The skin was also dissected down to this level and scored to allow adequate expansion of the lower pole.  The capsular flaps were anchored with 2-0 PDS along the fold.     The pocket was then copiously irrigated and immaculate hemostasis achieved.  A sizer was placed and some additional superior and superior lateral capsulotomy was performed.     The tumescent solution was infiltrated in the abdomen and left dwell for several minutes.  Fat harvest performed with bimanual technique using 3 and 4 mm cannulas and harvested with the assistance  of the revolve device.  It was rinsed 3 times with warmed lactated Ringer solution and transferred to 10 cc syringes.  It was infiltrated into the upper pole of the right breast to adequately fill out the loss of the upper pole volume from the mastectomy.     Reduction on the right side was then performed and an amputation style to create the significant elevation of breast footprint.  The superior flap was  developed at about 2 cm thickness and a portion of the inferior dermal parenchymal flap was preserved for central fullness.  The NAC was removed with the intervening tissue and passed off.  It was then tailor tacked This was sent off the field as specimen weighed about 700 g.  Closure was performed with 2-0 PDS anchoring the parenchymal flap to the chest wall, 3-0 Monocryl deep dermal layer and 4-0 Monocryl in the intracuticular.     The right breast sizer was then removed and the pocket again irrigated and made hemostatic.  50% Betadine solution was left dwell for several minutes and then rinsed clear.  The implant was brought on the field and gloves changed.  The implant was placed into the pocket with a Albrecht funnel and a no touch technique over a 10 Yi drain.  Closure was performed with 2-0 Vicryl in the superficial fascia, 3-0 Monocryl deep dermal layer and 4 Monocryl in the intracuticular.  Incisions were dressed Dermabond and Tegaderm and she was placed in a well-padded Ace wrap.           The patient was then aroused from anesthesia with ease and transferred to the postoperative care area in good condition. All sponge, needle, and instrument counts were correct.

## 2023-02-02 NOTE — ANESTHESIA POSTPROCEDURE EVALUATION
Patient: Mary Ann Michaels    Procedure Summary     Date: 02/02/23 Room / Location: [facility] OR 02 / [facility] MAIN OR    Anesthesia Start: 0754 Anesthesia Stop: 1135    Procedures:       LEFT REMOVAL JTISSUE EXPANDER AND PLACEMENT OF IMPLANT (Left: Breast)      FAT GRAFTING (Left)      RIGHT REDUCTION FOR SYMMETRY (Right: Chest) Diagnosis:     Surgeons: Raj Montgomery MD Provider: Krishna Owens MD    Anesthesia Type: general ASA Status: 3          Anesthesia Type: general    Vitals  Vitals Value Taken Time   /78 02/02/23 1231   Temp 36.6 °C (97.9 °F) 02/02/23 1229   Pulse 52 02/02/23 1245   Resp 14 02/02/23 1132   SpO2 95 % 02/02/23 1245   Vitals shown include unvalidated device data.        Post Anesthesia Care and Evaluation    Patient location during evaluation: PACU  Patient participation: complete - patient participated  Level of consciousness: awake and alert  Pain management: adequate    Airway patency: patent  Anesthetic complications: No anesthetic complications    Cardiovascular status: acceptable  Respiratory status: acceptable  Hydration status: acceptable    Comments: --------------------            02/02/23               1230     --------------------   BP:       133/78     Pulse:      52       Resp:                Temp:                SpO2:      93%      --------------------

## 2023-02-03 ENCOUNTER — TELEPHONE (OUTPATIENT)
Dept: RADIATION ONCOLOGY | Facility: HOSPITAL | Age: 68
End: 2023-02-03
Payer: MEDICARE

## 2023-02-03 LAB
LAB AP CASE REPORT: NORMAL
PATH REPORT.FINAL DX SPEC: NORMAL
PATH REPORT.GROSS SPEC: NORMAL

## 2023-02-03 NOTE — TELEPHONE ENCOUNTER
Left message for a return call. Need to reschedule her follow up appt with Dr Cook. He will not be in the office.

## 2023-02-07 ENCOUNTER — TELEPHONE (OUTPATIENT)
Dept: ONCOLOGY | Facility: CLINIC | Age: 68
End: 2023-02-07
Payer: MEDICARE

## 2023-02-07 NOTE — TELEPHONE ENCOUNTER
----- Message from Antonio Bishop sent at 2/7/2023  7:40 AM EST -----    ----- Message -----  From: Radha Parker  Sent: 2/6/2023   5:02 PM EST  To: Eva Sánchez  Pool    Please reschedule patient appt cancelled via [facility]. If leaving a voicemail please make a note in the chart per [place].Thank you

## 2023-02-21 ENCOUNTER — HOSPITAL ENCOUNTER (OUTPATIENT)
Dept: RADIATION ONCOLOGY | Facility: HOSPITAL | Age: 68
Setting detail: RADIATION/ONCOLOGY SERIES
End: 2023-02-21
Payer: MEDICARE

## 2023-02-22 NOTE — PROGRESS NOTES
RADIATION ONCOLOGY FOLLOW-UP NOTE    NAME: Mary Ann Michaels  YOB: 1955  MRN #: [id number]  DATE OF SERVICE: 2/24/2023  REFERRING PROVIDER: Tasia Siu APRN  [id number] [address]  PRIMARY CARE PROVIDER: Tasia Siu APRN    DIAGNOSIS:    1. Malignant neoplasm of upper-outer quadrant of left breast in female, estrogen receptor positive (HCC)      REASON FOR VISIT:  6M F/U    RADIATION TREATMENT COURSE:  DIBH Lt CW XRT  5000 cGy in 25 fractions, completed 07/18/2022.    HISTORY OF PRESENT ILLNESS: The patient is a 67 y.o. year old female who was last seen in our office on 08/19/2022. The plan was to continue skin care/ moisturizers, start vitamin E oil based massages of Lt CW/ scars, AI per med/onc and follow up here in February 2023 or earlier as needed.    She follows with Dr. Dhillon and was last seen on 08/19/2022. Their plan is to continue anastrozole (started July 2022), and follow up in 3-4 months. She is scheduled for follow up on 02/28/2023.    Her annual screening mammogram was completed on 12/12/2022. It showed no mammographic signs of malignancy, recommend annual screening. BI-RADS 1. Negative.        The following portions of the patient's history were reviewed and updated as appropriate: allergies, current medications, past family history, past medical history, past social history, past surgical history and problem list. Reviewed with the patient and remain unchanged.    PAST MEDICAL HISTORY:  she has a past medical history of Anxiety and depression, Female stress incontinence, GERD (gastroesophageal reflux disease), Hiatal hernia, History of left breast cancer (12/2021), History of radiation therapy (06/2022), Hypertension, Insomnia, and Sleep apnea.    MEDICATIONS:    Current Outpatient Medications:   •  acetaminophen (TYLENOL) 325 MG tablet, Take 1 tablet by mouth Every 4 (Four) Hours As Needed for Mild Pain., Disp: 30 tablet, Rfl: 1  •   anastrozole (Arimidex) 1 MG tablet, Take 1 tablet by mouth Daily., Disp: 30 tablet, Rfl: 3  •  Cholecalciferol 25 MCG (1000 UT) capsule, Take 1,000 Units by mouth Daily., Disp: , Rfl:   •  citalopram (CeleXA) 40 MG tablet, Take 1 tablet by mouth daily. (Patient taking differently: Take 40 mg by mouth Every Night.), Disp: 30 tablet, Rfl: 5  •  docusate sodium (COLACE) 250 MG capsule, Take 1 capsule by mouth 2 (Two) Times a Day As Needed for Constipation., Disp: 15 capsule, Rfl: 1  •  gabapentin (Neurontin) 100 MG capsule, Take 1 capsule by mouth Every 8 (Eight) Hours., Disp: 60 capsule, Rfl: 2  •  HYDROcodone-acetaminophen (NORCO) 5-325 MG per tablet, Take 1-2 tablets by mouth Every 4 (Four) Hours As Needed (Pain)., Disp: 15 tablet, Rfl: 0  •  ondansetron ODT (ZOFRAN-ODT) 8 MG disintegrating tablet, Place 1 tablet on the tongue Every 8 (Eight) Hours As Needed for Nausea or Vomiting., Disp: 10 tablet, Rfl: 1  •  propranolol LA (INDERAL LA) 160 MG 24 hr capsule, Take 1 capsule by mouth Every Night., Disp: 90 capsule, Rfl: 3  •  sennosides-docusate (PERICOLACE) 8.6-50 MG per tablet, Take 2 tablets by mouth Daily As Needed for Constipation., Disp: 30 tablet, Rfl: 1  •  spironolactone (ALDACTONE) 25 MG tablet, Take 1 tablet by mouth daily., Disp: 90 tablet, Rfl: 1  •  zolpidem (AMBIEN) 5 MG tablet, Take 1 tablet by mouth nightly as needed for Sleep., Disp: 30 tablet, Rfl: 2    ALLERGIES:  No Known Allergies    PAST SURGICAL HISTORY:  she has a past surgical history that includes Hysterectomy (); Cholecystectomy; Knee surgery (Right);  section; Mastectomy w/ sentinel node biopsy (Left, 2022); Breast reconstruction (Left, 2022); Colonoscopy (); Esophagogastroduodenoscopy; Cataract extraction w/ intraocular lens implant (Bilateral); Breast Tissue Expander Removal (Left, 2023); Fat Grafting (Left, 2023); and Breast Reduction (Right, 2023).    PREVIOUS RADIOTHERAPY OR CHEMOTHERAPY:  As  "noted above.    FAMILY HISTORY:  herfamily history includes Liver cancer in her brother and brother; Lung cancer in her father; Ovarian cancer (age of onset: 67) in her mother; Pancreatic cancer in her maternal grandmother.    SOCIAL HISTORY:  she reports that she has never smoked. She has never used smokeless tobacco. She reports that she does not drink alcohol and does not use drugs.    PAIN AND PAIN MANAGEMENT:  No pain.  Vitals:    02/24/23 1144   BP: 153/99   Pulse: 60   Resp: 18   Temp: 98.3 °F (36.8 °C)   TempSrc: Oral   SpO2: 97%   Weight: 121 kg (266 lb 6.4 oz)   Height: 162.6 cm (64\")   PainSc: 0-No pain       NUTRITIONAL STATUS:   no issues    KPS:  90:  Minor signs or symptoms      REVIEW OF SYSTEMS:   General: No fevers, chills, weight change, or drenching night sweats. Skin: No rashes or jaundice.  HEENT: No change in vision or hearing, no headaches.  Neck: No dysphagia or masses.  Heme/Lymph: No easy bruising or bleeding.  Respiratory System: No shortness of breath or cough.  Cardiovascular: No chest pain, palpitations, or dyspnea on exertion.  - Pacemaker. GI: No nausea, vomiting, diarrhea, melena, or hematochezia.  : No dysuria or hematuria.  Endocrine: No heat or cold intolerance. Musculoskeletal: No myalgias or arthralgias.  Neuro: No weakness, numbness, syncope, or seizures. Psych: No mood changes or depression. Ext: Denies swelling.    Objective   VITAL SIGNS:  Vitals:    02/24/23 1144   BP: 153/99   Pulse: 60   Resp: 18   Temp: 98.3 °F (36.8 °C)   TempSrc: Oral   SpO2: 97%   Weight: 121 kg (266 lb 6.4 oz)   Height: 162.6 cm (64\")   PainSc: 0-No pain       PHYSICAL EXAM:  GENERAL: In no apparent distress, sitting comfortably in room.    HEENT: Normocephalic, atraumatic. Pupils are equal, round, reactive to light. Sclera anicteric. Conjunctiva not injected. Oropharynx without erythema, ulcerations or thrush.   NECK: Supple with no masses.  LYMPHATIC: No cervical, supraclavicular or axillary " adenopathy appreciated bilaterally.   CARDIOVASCULAR: S1 & S2 detected; no murmurs, rubs or gallops.  CHEST: Clear to auscultation bilaterally; no wheezes, crackles or rubs. Work of breathing normal.  ABDOMEN: Bowel sounds present. Abdomen is soft, nontender, nondistended.   MUSCULOSKELETAL: No tenderness to palpation along the spine or scapulae. Normal range of motion.  EXTREMITIES: No clubbing, cyanosis, edema.  SKIN: No erythema, rashes, ulcerations noted.   NEUROLOGIC: Cranial nerves II-XII grossly intact bilaterally. No focal neurologic deficits.  PSYCHIATRIC:  Alert, aware, and appropriate.  BREASTS: cNED by exam.      PERTINENT IMAGING/PATHOLOGY/LABS (Medical Decision Making):     COORDINATION OF CARE: A copy of this note is sent to the referring provider.    PATHOLOGY (Reviewed):     IMAGING (Reviewed):     LABS (Reviewed):  HEMATOLOGY:  WBC   Date Value Ref Range Status   01/30/2023 6.31 3.40 - 10.80 10*3/mm3 Final   02/26/2019 5.8 4.5 - 11.0 10*3/uL Final     RBC   Date Value Ref Range Status   01/30/2023 5.01 3.77 - 5.28 10*6/mm3 Final   02/26/2019 4.68 4.0 - 6.0 10*6/uL Final     Hemoglobin   Date Value Ref Range Status   01/30/2023 15.0 12.0 - 15.9 g/dL Final   02/26/2019 14.6 12.0 - 16.0 g/dL Final     Hematocrit   Date Value Ref Range Status   01/30/2023 44.3 34.0 - 46.6 % Final   02/26/2019 42.2 33.0 - 51.0 % Final     Platelets   Date Value Ref Range Status   01/30/2023 192 140 - 450 10*3/mm3 Final   02/26/2019 153 150 - 450 10*3/uL Final     CHEMISTRY:  Lab Results   Component Value Date    GLUCOSE 117 (H) 01/30/2023    BUN 24 (H) 01/30/2023    CREATININE 1.13 (H) 01/30/2023    BCR 21.2 01/30/2023    K 4.2 01/30/2023    CO2 30.3 (H) 01/30/2023    CALCIUM 10.0 01/30/2023    ALBUMIN 3.80 04/22/2022    LABIL2 1.2 12/09/2021    AST 20 04/22/2022    ALT 24 04/22/2022     Assessment & Plan   ASSESSMENT AND PLAN:    1. Malignant neoplasm of upper-outer quadrant of left breast in female, estrogen receptor  positive (HCC)       Continue Arimidex  -Interval imaging and exam cNED  -Continue Vit E oil based massages  6 month f/u here    This assessment comes from my review of the imaging, pathology, physician notes and other pertinent information as mentioned.          TIME SPENT WITH PATIENT:   I spent 20 minutes caring for Mary Ann on this date of service. This time includes time spent by me in the following activities: preparing for the visit, reviewing tests, obtaining and/or reviewing a separately obtained history, performing a medically appropriate examination and/or evaluation, documenting information in the medical record and care coordination      CC: SAMANTA Rodney MD Mounika Mandadi, MD John A Cox, MD  2/24/2023  3:23 PM EST

## 2023-02-24 ENCOUNTER — OFFICE VISIT (OUTPATIENT)
Dept: RADIATION ONCOLOGY | Facility: HOSPITAL | Age: 68
End: 2023-02-24
Payer: MEDICARE

## 2023-02-24 VITALS
OXYGEN SATURATION: 97 % | DIASTOLIC BLOOD PRESSURE: 99 MMHG | TEMPERATURE: 98.3 F | SYSTOLIC BLOOD PRESSURE: 153 MMHG | HEIGHT: 64 IN | RESPIRATION RATE: 18 BRPM | WEIGHT: 266.4 LBS | BODY MASS INDEX: 45.48 KG/M2 | HEART RATE: 60 BPM

## 2023-02-24 DIAGNOSIS — Z17.0 MALIGNANT NEOPLASM OF UPPER-OUTER QUADRANT OF LEFT BREAST IN FEMALE, ESTROGEN RECEPTOR POSITIVE: ICD-10-CM

## 2023-02-24 DIAGNOSIS — C50.412 MALIGNANT NEOPLASM OF UPPER-OUTER QUADRANT OF LEFT BREAST IN FEMALE, ESTROGEN RECEPTOR POSITIVE: ICD-10-CM

## 2023-02-24 PROCEDURE — G0463 HOSPITAL OUTPT CLINIC VISIT: HCPCS | Performed by: RADIOLOGY

## 2023-02-24 PROCEDURE — 99213 OFFICE O/P EST LOW 20 MIN: CPT | Performed by: RADIOLOGY

## 2023-02-28 ENCOUNTER — LAB (OUTPATIENT)
Dept: LAB | Facility: HOSPITAL | Age: 68
End: 2023-02-28
Payer: MEDICARE

## 2023-02-28 ENCOUNTER — OFFICE VISIT (OUTPATIENT)
Dept: ONCOLOGY | Facility: CLINIC | Age: 68
End: 2023-02-28
Payer: MEDICARE

## 2023-02-28 VITALS
HEIGHT: 64 IN | WEIGHT: 266 LBS | BODY MASS INDEX: 45.41 KG/M2 | TEMPERATURE: 98.2 F | SYSTOLIC BLOOD PRESSURE: 142 MMHG | OXYGEN SATURATION: 96 % | DIASTOLIC BLOOD PRESSURE: 80 MMHG | RESPIRATION RATE: 16 BRPM | HEART RATE: 59 BPM

## 2023-02-28 DIAGNOSIS — C50.412 MALIGNANT NEOPLASM OF UPPER-OUTER QUADRANT OF LEFT BREAST IN FEMALE, ESTROGEN RECEPTOR POSITIVE: ICD-10-CM

## 2023-02-28 DIAGNOSIS — Z78.0 POST-MENOPAUSAL: ICD-10-CM

## 2023-02-28 DIAGNOSIS — Z17.0 MALIGNANT NEOPLASM OF UPPER-OUTER QUADRANT OF LEFT BREAST IN FEMALE, ESTROGEN RECEPTOR POSITIVE: ICD-10-CM

## 2023-02-28 DIAGNOSIS — C50.412 MALIGNANT NEOPLASM OF UPPER-OUTER QUADRANT OF LEFT BREAST IN FEMALE, ESTROGEN RECEPTOR POSITIVE: Primary | ICD-10-CM

## 2023-02-28 DIAGNOSIS — Z17.0 MALIGNANT NEOPLASM OF UPPER-OUTER QUADRANT OF LEFT BREAST IN FEMALE, ESTROGEN RECEPTOR POSITIVE: Primary | ICD-10-CM

## 2023-02-28 LAB
ALBUMIN SERPL-MCNC: 4.2 G/DL (ref 3.5–5.2)
ALBUMIN/GLOB SERPL: 1.4 G/DL (ref 1.1–2.4)
ALP SERPL-CCNC: 93 U/L (ref 38–116)
ALT SERPL W P-5'-P-CCNC: 31 U/L (ref 0–33)
ANION GAP SERPL CALCULATED.3IONS-SCNC: 12.5 MMOL/L (ref 5–15)
AST SERPL-CCNC: 29 U/L (ref 0–32)
BASOPHILS # BLD AUTO: 0.05 10*3/MM3 (ref 0–0.2)
BASOPHILS NFR BLD AUTO: 0.8 % (ref 0–1.5)
BILIRUB SERPL-MCNC: 0.6 MG/DL (ref 0.2–1.2)
BUN SERPL-MCNC: 21 MG/DL (ref 6–20)
BUN/CREAT SERPL: 21 (ref 7.3–30)
CALCIUM SPEC-SCNC: 10.2 MG/DL (ref 8.5–10.2)
CHLORIDE SERPL-SCNC: 104 MMOL/L (ref 98–107)
CO2 SERPL-SCNC: 27.5 MMOL/L (ref 22–29)
CREAT SERPL-MCNC: 1 MG/DL (ref 0.6–1.1)
DEPRECATED RDW RBC AUTO: 47.1 FL (ref 37–54)
EGFRCR SERPLBLD CKD-EPI 2021: 61.5 ML/MIN/1.73
EOSINOPHIL # BLD AUTO: 0.43 10*3/MM3 (ref 0–0.4)
EOSINOPHIL NFR BLD AUTO: 7.3 % (ref 0.3–6.2)
ERYTHROCYTE [DISTWIDTH] IN BLOOD BY AUTOMATED COUNT: 14.2 % (ref 12.3–15.4)
GLOBULIN UR ELPH-MCNC: 2.9 GM/DL (ref 1.8–3.5)
GLUCOSE SERPL-MCNC: 108 MG/DL (ref 74–124)
HCT VFR BLD AUTO: 41.3 % (ref 34–46.6)
HGB BLD-MCNC: 13.9 G/DL (ref 12–15.9)
IMM GRANULOCYTES # BLD AUTO: 0.02 10*3/MM3 (ref 0–0.05)
IMM GRANULOCYTES NFR BLD AUTO: 0.3 % (ref 0–0.5)
LYMPHOCYTES # BLD AUTO: 1.22 10*3/MM3 (ref 0.7–3.1)
LYMPHOCYTES NFR BLD AUTO: 20.6 % (ref 19.6–45.3)
MCH RBC QN AUTO: 30.6 PG (ref 26.6–33)
MCHC RBC AUTO-ENTMCNC: 33.7 G/DL (ref 31.5–35.7)
MCV RBC AUTO: 91 FL (ref 79–97)
MONOCYTES # BLD AUTO: 0.56 10*3/MM3 (ref 0.1–0.9)
MONOCYTES NFR BLD AUTO: 9.5 % (ref 5–12)
NEUTROPHILS NFR BLD AUTO: 3.64 10*3/MM3 (ref 1.7–7)
NEUTROPHILS NFR BLD AUTO: 61.5 % (ref 42.7–76)
NRBC BLD AUTO-RTO: 0 /100 WBC (ref 0–0.2)
PLATELET # BLD AUTO: 160 10*3/MM3 (ref 140–450)
PMV BLD AUTO: 11.2 FL (ref 6–12)
POTASSIUM SERPL-SCNC: 4.3 MMOL/L (ref 3.5–4.7)
PROT SERPL-MCNC: 7.1 G/DL (ref 6.3–8)
RBC # BLD AUTO: 4.54 10*6/MM3 (ref 3.77–5.28)
SODIUM SERPL-SCNC: 144 MMOL/L (ref 134–145)
WBC NRBC COR # BLD: 5.92 10*3/MM3 (ref 3.4–10.8)

## 2023-02-28 PROCEDURE — 99214 OFFICE O/P EST MOD 30 MIN: CPT | Performed by: INTERNAL MEDICINE

## 2023-02-28 PROCEDURE — 80053 COMPREHEN METABOLIC PANEL: CPT

## 2023-02-28 PROCEDURE — 36415 COLL VENOUS BLD VENIPUNCTURE: CPT

## 2023-02-28 PROCEDURE — 85025 COMPLETE CBC W/AUTO DIFF WBC: CPT

## 2023-02-28 NOTE — PROGRESS NOTES
Subjective   Mary Ann Michaels is a 67 y.o. female.  Referred by Dr. Isaac for left breast invasive lobular carcinoma    History of Present Illness    is a 66-year-old postmenopausal  lady with hypertension presented with a left breast screening abnormality.  Family history is significant for her daughter with DCIS and currently on tamoxifen at the age of 49, mother with history of ovarian cancer at the age of 67, maternal grandmother with history of pancreatic cancer in her 70s, 2 brothers with liver cancer.    12/7/2021-bilateral screening mammogram-new left breast focal asymmetry.  Diagnostic mammogram recommended.  Left breast ultrasound recommended.  Right breast is benign.    12/17/2021-left breast diagnostic mammogram and ultrasound  6 mm indistinct mass in the outer slightly upper left breast, posterior depth.  Ultrasound shows a 5 x 4 x 5 mm indistinct hypoechoic mass at 2:00, 9 cm from the nipple.  Ultrasound-guided biopsy of the left breast mass recommended.    1/12/2022-ultrasound-guided biopsy  1.specimen 1-left breast 2:00, 15 cm from the nipple-invasive lobular carcinoma, largest tumor focus 5 mm  Negative for carcinoma in situ or lymphovascular space invasion.  ER positive strong 100%  ME positive strong 100%  HER2 negative    Specimen to-left breast 2:00, 9 cm from the nipple  Invasive lobular carcinoma, focal atypical lobular hyperplasia  Negative for carcinoma in situ or lymphovascular space invasion.    1/30/2022-bilateral breast MRI  V shaped metallic clip is seen at the 2 o'clock position in the upper outer quadrant of the left breast which does not show any surrounding enhancement.  But this is the biopsy-proven site of malignancy.  This is posterior to a 5.2 cm clumped enhancement.  This is suspicious for additional disease.  No evidence of left axillary lymphadenopathy.  There is a more posterior located culture metallic clip in the posterior one third at 2 o'clock position  which is immediately adjacent to a 0.9 cm ill-defined enhancing lesion.  This represents biopsy-proven site of malignancy.  No other suspicious findings in the right breast.    She was seen by Dr. Isaac who performed a left mastectomy and sentinel lymph node biopsy on 3/30/2022  Pathology was consistent with invasive lobular carcinoma, 2 foci.  Larger focus measures 6 cm and the smaller focus measures 3.5 mm and noted in the nipple.  No skin ulceration.  7 sentinel lymph nodes were evaluated and negative for malignancy.  Both the tumors were strongly ER/VA positive at 99%, HER2 negative and Ki-67 was 12 to 13% on the larger tumor and 8% on the smaller tumor.    She is undergoing reconstruction by Dr. Montgomery.  She presents today accompanied by her friend Natalya to discuss adjuvant therapy.    Oncotype DX recurrence score noted to be 12 with a 9-year risk of distant metastasis of 3% with tamoxifen or AI alone.  And less than 1% benefit from chemotherapy.    Ms. Michaels has met with Dr. Cook and completed adjuvant radiation in July 2022.    Started anastrozole July 2022    Interval history  Patient is tolerating anastrozole well with no new complaints.  She reports that the hot flashes have improved.  Denies any new bone pains, cough, abdominal pain nausea vomiting.  She had reconstructive surgery of the left breast performed by Dr. Montgomery and has some discomfort in the area.  She was seen by her primary care physician who performed a lipid panel and and noted to have elevated cholesterol.  No other complaints at this time.    The following portions of the patient's history were reviewed and updated as appropriate: allergies, current medications, past family history, past medical history, past social history, past surgical history and problem list.    Past Medical History:   Diagnosis Date   • Anxiety and depression    • Female stress incontinence    • GERD (gastroesophageal reflux disease)    • Hiatal hernia     • History of left breast cancer 2021   • History of radiation therapy 2022    25 TREATMENTS   • Hypertension    • Insomnia    • Sleep apnea     CPAP        Past Surgical History:   Procedure Laterality Date   • BILATERAL BREAST REDUCTION Right 2023    Procedure: RIGHT REDUCTION FOR SYMMETRY;  Surgeon: Raj Montgomery MD;  Location: [facility];  Service: Plastics;  Laterality: Right;   • BREAST RECONSTRUCTION Left 2022    Procedure: LEFT PLACEMENT TISSUE EXPANDER AND ALLODERM;  Surgeon: Raj Montgomery MD;  Location: [facility];  Service: Plastics;  Laterality: Left;   • BREAST TISSUE EXPANDER REMOVAL INSERTION OF IMPLANT Left 2023    Procedure: LEFT REMOVAL JTISSUE EXPANDER AND PLACEMENT OF IMPLANT;  Surgeon: Raj Montgomery MD;  Location: [facility];  Service: Plastics;  Laterality: Left;   • CATARACT EXTRACTION W/ INTRAOCULAR LENS IMPLANT Bilateral     1-3-2023    1-   •  SECTION         • CHOLECYSTECTOMY     • COLONOSCOPY     • ENDOSCOPY     • FAT GRAFTING Left 2023    Procedure: FAT GRAFTING;  Surgeon: Raj Montgomery MD;  Location: [facility];  Service: Plastics;  Laterality: Left;   • HYSTERECTOMY     • KNEE SURGERY Right     ARTHROSCOPY TENDON REPAIR   • MASTECTOMY W/ SENTINEL NODE BIOPSY Left 2022    Procedure: left total mastectomy with left axillary sentinel lymph node biopsy;  Surgeon: Catherine Isaac MD;  Location: [facility];  Service: General;  Laterality: Left;        Family History   Problem Relation Age of Onset   • Ovarian cancer Mother 67   • Lung cancer Father    • Liver cancer Brother    • Liver cancer Brother    • Pancreatic cancer Maternal Grandmother    • Malig Hyperthermia Neg Hx         Social History     Socioeconomic History   • Marital status:    Tobacco Use   • Smoking status: Never   • Smokeless tobacco: Never   Vaping Use   • Vaping Use: Never used   Substance and  Sexual Activity   • Alcohol use: Never   • Drug use: Never   • Sexual activity: Defer        OB History    No obstetric history on file.     Age at menarche-11  Age at first live childbirth-18  She had a hysterectomy and a bilateral salpingo-oophorectomy at the age of 39  No use of hormone replacement therapy  Oral contraceptive pill use for 2 to 3 years   3 para 2  1    No Known Allergies         Review of Systems   Constitutional: Positive for unexpected weight gain.   HENT: Negative.    Eyes: Negative.    Respiratory: Negative.    Cardiovascular: Negative.    Gastrointestinal: Negative.    Endocrine: Negative.    Genitourinary: Negative.    Musculoskeletal: Negative.    Allergic/Immunologic: Negative.    Hematological: Negative.    Psychiatric/Behavioral: Negative.      Review of systems as mentioned in the HPI    Objective   not currently breastfeeding.   Physical Exam  Vitals reviewed.   Constitutional:       Appearance: Normal appearance. She is obese.   HENT:      Head: Normocephalic.      Nose: Nose normal.      Mouth/Throat:      Mouth: Mucous membranes are moist.   Eyes:      Conjunctiva/sclera: Conjunctivae normal.      Pupils: Pupils are equal, round, and reactive to light.   Cardiovascular:      Rate and Rhythm: Normal rate.   Pulmonary:      Effort: Pulmonary effort is normal.   Abdominal:      General: Abdomen is flat.   Musculoskeletal:         General: Normal range of motion.      Cervical back: Normal range of motion.   Skin:     General: Skin is warm.   Neurological:      General: No focal deficit present.      Mental Status: She is alert and oriented to person, place, and time. Mental status is at baseline.   Psychiatric:         Mood and Affect: Mood normal.         Behavior: Behavior normal.         Thought Content: Thought content normal.         Judgment: Judgment normal.       Breast Exam: Left breast status post mastectomy with.  Right breast appears normal on inspection.   Nipple inverted.  No other palpable abnormalities of the right breast.    I have reexamined the patient and the results are consistent with the previously documented exam. Barbie Dhillon MD     Lab on 02/28/2023   Component Date Value Ref Range Status   • WBC 02/28/2023 5.92  3.40 - 10.80 10*3/mm3 Final   • RBC 02/28/2023 4.54  3.77 - 5.28 10*6/mm3 Final   • Hemoglobin 02/28/2023 13.9  12.0 - 15.9 g/dL Final   • Hematocrit 02/28/2023 41.3  34.0 - 46.6 % Final   • MCV 02/28/2023 91.0  79.0 - 97.0 fL Final   • MCH 02/28/2023 30.6  26.6 - 33.0 pg Final   • MCHC 02/28/2023 33.7  31.5 - 35.7 g/dL Final   • RDW 02/28/2023 14.2  12.3 - 15.4 % Final   • RDW-SD 02/28/2023 47.1  37.0 - 54.0 fl Final   • MPV 02/28/2023 11.2  6.0 - 12.0 fL Final   • Platelets 02/28/2023 160  140 - 450 10*3/mm3 Final   • Neutrophil % 02/28/2023 61.5  42.7 - 76.0 % Final   • Lymphocyte % 02/28/2023 20.6  19.6 - 45.3 % Final   • Monocyte % 02/28/2023 9.5  5.0 - 12.0 % Final   • Eosinophil % 02/28/2023 7.3 (H)  0.3 - 6.2 % Final   • Basophil % 02/28/2023 0.8  0.0 - 1.5 % Final   • Immature Grans % 02/28/2023 0.3  0.0 - 0.5 % Final   • Neutrophils, Absolute 02/28/2023 3.64  1.70 - 7.00 10*3/mm3 Final   • Lymphocytes, Absolute 02/28/2023 1.22  0.70 - 3.10 10*3/mm3 Final   • Monocytes, Absolute 02/28/2023 0.56  0.10 - 0.90 10*3/mm3 Final   • Eosinophils, Absolute 02/28/2023 0.43 (H)  0.00 - 0.40 10*3/mm3 Final   • Basophils, Absolute 02/28/2023 0.05  0.00 - 0.20 10*3/mm3 Final   • Immature Grans, Absolute 02/28/2023 0.02  0.00 - 0.05 10*3/mm3 Final   • nRBC 02/28/2023 0.0  0.0 - 0.2 /100 WBC Final   Admission on 02/02/2023, Discharged on 02/02/2023   Component Date Value Ref Range Status   • Case Report 02/02/2023    Final                    Value:Surgical Pathology Report                         Case: [id number]                                  Authorizing Provider:  Raj Montgomery MD  Collected:           02/02/2023 10:00 AM           Ordering Location:     [facility]  Received:            02/02/2023 10:23 AM                                 MAIN OR                                                                      Pathologist:           Alethea Browne MD                                                          Specimen:    Breast, Right, 703g                                                                       • Final Diagnosis 02/02/2023    Final                    Value:This result contains rich text formatting which cannot be displayed here.   • Gross Description 02/02/2023    Final                    Value:This result contains rich text formatting which cannot be displayed here.   Pre-Admission Testing on 01/30/2023   Component Date Value Ref Range Status   • WBC 01/30/2023 6.31  3.40 - 10.80 10*3/mm3 Final   • RBC 01/30/2023 5.01  3.77 - 5.28 10*6/mm3 Final   • Hemoglobin 01/30/2023 15.0  12.0 - 15.9 g/dL Final   • Hematocrit 01/30/2023 44.3  34.0 - 46.6 % Final   • MCV 01/30/2023 88.4  79.0 - 97.0 fL Final   • MCH 01/30/2023 29.9  26.6 - 33.0 pg Final   • MCHC 01/30/2023 33.9  31.5 - 35.7 g/dL Final   • RDW 01/30/2023 13.3  12.3 - 15.4 % Final   • RDW-SD 01/30/2023 43.4  37.0 - 54.0 fl Final   • MPV 01/30/2023 11.3  6.0 - 12.0 fL Final   • Platelets 01/30/2023 192  140 - 450 10*3/mm3 Final   • Glucose 01/30/2023 117 (H)  65 - 99 mg/dL Final   • BUN 01/30/2023 24 (H)  8 - 23 mg/dL Final   • Creatinine 01/30/2023 1.13 (H)  0.57 - 1.00 mg/dL Final   • Sodium 01/30/2023 138  136 - 145 mmol/L Final   • Potassium 01/30/2023 4.2  3.5 - 5.2 mmol/L Final   • Chloride 01/30/2023 100  98 - 107 mmol/L Final   • CO2 01/30/2023 30.3 (H)  22.0 - 29.0 mmol/L Final   • Calcium 01/30/2023 10.0  8.6 - 10.5 mg/dL Final   • BUN/Creatinine Ratio 01/30/2023 21.2  7.0 - 25.0 Final   • Anion Gap 01/30/2023 7.7  5.0 - 15.0 mmol/L Final   • eGFR 01/30/2023 53.1 (L)  >60.0 mL/min/1.73 Final   • QT Interval 01/30/2023 448  ms Final        No  radiology results for the last 30 days.   2/28/2023 CBC reviewed and within normal limits  2/22/2023-CMP within normal limit    Assessment & Plan       *Left breast invasive lobular carcinoma  · pT3 multifocal N0 M0  · Clinical prognostic stage II, pathological prognostic stage I.   2 foci of invasive lobular carcinoma noted 1 was 60 mm and the second was 3.5 mm.  · Margins negative  · Both the tumors were strongly ER/IA positive HER2 negative with a low Ki-67, grade 1  · Oncotype DX recurrence score 12 with a 3% risk of distant metastasis with AI or tamoxifen alone and less than 1% benefit from chemotherapy  · Completed adjuvant radiation to the left breast.  · Started anastrozole July 2022  · Tolerating anastrozole well.  Hot flashes have improved.  · No evidence of recurrent disease  · Continue anastrozole  · Right breast mammogram from December 2022 reviewed and benign.    *Bone health  · DEXA from December 2021 reviewed and had a normal bone mineral density  · Repeat December 2023    *Hypertension  · Blood pressure somewhat elevated at 142/80  · Continue current medication    *Hypercholesterolemia  · New diagnosis  · She has joined weight watchers and also plans to exercise regularly.  Planning to join a program.  · Plan is to have this rechecked by her primary care physician in 3 months and may have to be started on medication as needed    *Family history of breast cancer  · Genetic testing performed and negative for any pathogenic mutation.    *Cahvrt-xz-9-month

## 2023-03-02 ENCOUNTER — TELEPHONE (OUTPATIENT)
Dept: SURGERY | Facility: CLINIC | Age: 68
End: 2023-03-02
Payer: MEDICARE

## 2023-03-02 NOTE — TELEPHONE ENCOUNTER
Scheduled screening mammo [facility] Mon 12/18/23 12:45 pm.     Follow up 12/29/23 Fri 9:20 Alfonzo Yu APRN.     Had to leave patient a detailed message.

## 2023-04-06 ENCOUNTER — HOSPITAL ENCOUNTER (OUTPATIENT)
Dept: PHYSICAL THERAPY | Facility: HOSPITAL | Age: 68
Setting detail: THERAPIES SERIES
Discharge: HOME | End: 2023-04-06
Payer: MEDICARE

## 2023-04-06 DIAGNOSIS — C50.412 MALIGNANT NEOPLASM OF UPPER-OUTER QUADRANT OF LEFT BREAST IN FEMALE, ESTROGEN RECEPTOR POSITIVE: ICD-10-CM

## 2023-04-06 DIAGNOSIS — Z91.89 AT RISK FOR LYMPHEDEMA: ICD-10-CM

## 2023-04-06 DIAGNOSIS — Z90.12 S/P MASTECTOMY, LEFT: Primary | ICD-10-CM

## 2023-04-06 DIAGNOSIS — Z17.0 MALIGNANT NEOPLASM OF UPPER-OUTER QUADRANT OF LEFT BREAST IN FEMALE, ESTROGEN RECEPTOR POSITIVE: ICD-10-CM

## 2023-04-06 PROCEDURE — 93702 BIS XTRACELL FLUID ANALYSIS: CPT

## 2023-04-06 PROCEDURE — 97110 THERAPEUTIC EXERCISES: CPT

## 2023-04-07 PROCEDURE — 93702 BIS XTRACELL FLUID ANALYSIS: CPT

## 2023-04-07 NOTE — THERAPY RE-EVALUATION
Physical Therapy Lymphedema Re-Evaluation  [facility]     Patient Name: Mary Ann Michaels  : 1955  MRN: [id number]  Today's Date: 2023      Visit Date: 2023    Visit Dx:    ICD-10-CM ICD-9-CM   1. S/P mastectomy, left  Z90.12 V45.71   2. Malignant neoplasm of upper-outer quadrant of left breast in female, estrogen receptor positive  C50.412 174.4    Z17.0 V86.0   3. At risk for lymphedema  Z91.89 V49.89       Patient Active Problem List   Diagnosis   • Breast cancer of upper-outer quadrant of left female breast        Past Medical History:   Diagnosis Date   • Anxiety and depression    • Female stress incontinence    • GERD (gastroesophageal reflux disease)    • Hiatal hernia    • History of left breast cancer 2021   • History of radiation therapy 2022    25 TREATMENTS   • Hypertension    • Insomnia    • Sleep apnea     CPAP        Past Surgical History:   Procedure Laterality Date   • BILATERAL BREAST REDUCTION Right 2023    Procedure: RIGHT REDUCTION FOR SYMMETRY;  Surgeon: Raj Montgomery MD;  Location: [facility];  Service: Plastics;  Laterality: Right;   • BREAST RECONSTRUCTION Left 2022    Procedure: LEFT PLACEMENT TISSUE EXPANDER AND ALLODERM;  Surgeon: Raj Montgomery MD;  Location: [facility];  Service: Plastics;  Laterality: Left;   • BREAST TISSUE EXPANDER REMOVAL INSERTION OF IMPLANT Left 2023    Procedure: LEFT REMOVAL JTISSUE EXPANDER AND PLACEMENT OF IMPLANT;  Surgeon: Raj Montgomery MD;  Location: [facility];  Service: Plastics;  Laterality: Left;   • CATARACT EXTRACTION W/ INTRAOCULAR LENS IMPLANT Bilateral     1-3-2023    1-   •  SECTION         • CHOLECYSTECTOMY     • COLONOSCOPY     • ENDOSCOPY     • FAT GRAFTING Left 2023    Procedure: FAT GRAFTING;  Surgeon: Raj Montgomery MD;  Location: [facility];  Service: Plastics;  Laterality: Left;   • HYSTERECTOMY     • KNEE SURGERY Right      ARTHROSCOPY TENDON REPAIR   • MASTECTOMY W/ SENTINEL NODE BIOPSY Left 03/30/2022    Procedure: left total mastectomy with left axillary sentinel lymph node biopsy;  Surgeon: Catherine Isaac MD;  Location: [facility];  Service: General;  Laterality: Left;       Visit Dx:    ICD-10-CM ICD-9-CM   1. S/P mastectomy, left  Z90.12 V45.71   2. Malignant neoplasm of upper-outer quadrant of left breast in female, estrogen receptor positive  C50.412 174.4    Z17.0 V86.0   3. At risk for lymphedema  Z91.89 V49.89            Lymphedema     Row Name 04/07/23 0700             L-Dex Bioimpedence Screening    L-Dex Measurement Extremity LUE  -LB      L-Dex Patient Position Standing  -LB      L-Dex UE Dominate Side Right  -LB      L-Dex UE At Risk Side Left  -LB      L-Dex UE Pre Surgical Value No  -LB      L-Dex UE Score 5.6  -LB      L-Dex UE Baseline Score 1.5  -LB      L-Dex UE Value Change 4.1  -LB      L-Dex UE Comment WNL; stable from last reading  low quality RLE through 5 attempted readings  -LB      $ L-Dex Charge yes  -LB            User Key  (r) = Recorded By, (t) = Taken By, (c) = Cosigned By    Initials Name Provider Type    Cheli Devlin PT Physical Therapist                                Therapy Education  Education Details: issued HEP for UE strengthening-[id number], discussed bioimpedance results and interpretation, compression garment use as tolerated  Given: Symptoms/condition management, Edema management  Program: Reinforced  How Provided: Verbal  Provided to: Patient  Level of Understanding: Verbalized       OP Exercises     Row Name 04/07/23 0700             Subjective Comments    Subjective Comments I asked my plastic surgeon if I could do strengthening. Can I start some kind of strengthening program?  -LB         Subjective Pain    Able to rate subjective pain? yes  -LB      Pre-Treatment Pain Level 0  -LB         Total Minutes    05794 - PT Therapeutic Exercise Minutes 15  -LB          Exercise 1    Exercise Name 1 scap retraction  -LB      Reps 1 10  -LB      Time 1 5  -LB         Exercise 2    Exercise Name 2 tband row/extension  -LB      Sets 2 2  -LB      Reps 2 10  -LB      Time 2 RTB  -LB         Exercise 3    Exercise Name 3 supine HA  -LB      Sets 3 2  -LB      Reps 3 10  -LB      Time 3 RTB  -LB         Exercise 4    Exercise Name 4 standing B ER  -LB      Sets 4 2  -LB      Reps 4 10  -LB      Time 4 RTB  -LB         Exercise 5    Exercise Name 5 biceps curl  -LB      Sets 5 2  -LB      Reps 5 10  -LB         Exercise 6    Exercise Name 6 supine triceps extension  -LB      Sets 6 2  -LB      Reps 6 10  -LB         Exercise 7    Exercise Name 7 SAP  -LB      Sets 7 2  -LB      Reps 7 10  -LB            User Key  (r) = Recorded By, (t) = Taken By, (c) = Cosigned By    Initials Name Provider Type    Cheli Devlin, PT Physical Therapist                             PT OP Goals     Row Name 04/07/23 0700          Long Term Goals    LTG Date to Achieve 06/12/22  -LB     LTG 1 Pt will maintain LDex bioimpedance WNL.  -LB     LTG 1 Progress Ongoing  -LB     LTG 1 Progress Comments WNL today, stable since last reading 5.6  -LB     LTG 2 Pt will acquire appropriately fitted compression garment and verbalize appropriate wear schedule.  -LB     LTG 2 Progress Met  -LB     LTG 3 Pt will demonstrate grossly 120 deg flexion/abduction LUE to improve her tolerane to radiation.  -LB     LTG 3 Progress Met  -LB     LTG 4 Pt will report reduced tightness and discomfort L axilla to minimal.  -LB     LTG 4 Progress Met  -LB           User Key  (r) = Recorded By, (t) = Taken By, (c) = Cosigned By    Initials Name Provider Type    Cheli Devlin, PT Physical Therapist                 PT Assessment/Plan     Row Name 04/07/23 0744          PT Assessment    Functional Limitations Other (comment)  at risk for lymphedema  -LB     Impairments Impaired flexibility;Impaired lymphatic circulation;Posture;Range of  motion;Sensation  -LB     Assessment Comments Mary Ann Michaels is a 67 y.o. female who returns for 5 month f/u biompedance spectroscopy diagnostic assessment of lymphatics of the upper extremities due to increased risk of post mastectomy lymphedema Left upper extremity due to history of s/p Left Modified Radical Mastectomy with [place] Node Biopsy with immediate reconstruction prepectoral tissue expander performed on 3/30/22 by surgeons Dr. Isaac/Ulises. Mary Ann Michaels is at increased risk of post mastectomy lymphedema syndrome Left Upper Extremity due to Radiation therapy Breast surgery Lymph Node Removal. Current L-Dex score is 5.6, which is WNL and stable from last reading. Pt interested in UE strengthening program which we discussed and issued today. We reviewed monitoring for symptoms and use of compression garment as needed. We will return in 6 months to assess response to management. Pt remains appropriate for continued skilled PT services to monitor for lymphedema and progress strengthening as needed.  -LB     Rehab Potential Good  -LB     Patient/caregiver participated in establishment of treatment plan and goals Yes  -LB     Patient would benefit from skilled therapy intervention Yes  -LB        PT Plan    PT Frequency Other (comment)  -LB     Predicted Duration of Therapy Intervention (PT) repeat bioimpedance in 6 months  -LB     Planned CPT's? PT EVAL LOW COMPLEXITY: 42658;PT RE-EVAL: 57152;PT THER PROC EA 15 MIN: 61727;PT THER ACT EA 15 MIN: 03200;PT MANUAL THERAPY EA 15 MIN: 31139;PT SELF CARE/HOME MGMT/TRAIN EA 15: 23397;PT BIS XTRACELL FLUID ANALYSIS: 98371  -LB     PT Plan Comments repeat bioimpedance in 6 months  -LB           User Key  (r) = Recorded By, (t) = Taken By, (c) = Cosigned By    Initials Name Provider Type    Cheli Devlin, PT Physical Therapist                             Time Calculation:   Start Time: 1445  Stop Time: 1515  Time Calculation (min): 30 min  Total Timed Code  Minutes- PT: 15 minute(s)  Timed Charges  20390 - PT Therapeutic Exercise Minutes: 15  Total Minutes  Timed Charges Total Minutes: 15   Total Minutes: 15   Therapy Charges for Today     Code Description Service Date Service Provider Modifiers Qty    97908898259 HC PT THER PROC EA 15 MIN 4/6/2023 Cheli Tracey, PT GP 1    93936784272 HC PT BIS XTRACELL FLUID ANALYSIS 4/6/2023 Cheli Tracey, PT  1    [id number] HC PT BIS XTRACELL FLUID ANALYSIS 4/7/2023 Cheli Tracey, PT  1                    Cheli Tracey, PT  4/7/2023

## 2023-05-02 RX ORDER — ANASTROZOLE 1 MG/1
TABLET ORAL
Qty: 90 TABLET | Refills: 3 | Status: SHIPPED | OUTPATIENT
Start: 2023-05-02

## 2023-05-03 RX ORDER — ANASTROZOLE 1 MG/1
TABLET ORAL
OUTPATIENT
Start: 2023-05-03

## 2023-05-05 PROCEDURE — 87077 CULTURE AEROBIC IDENTIFY: CPT | Performed by: PHYSICIAN ASSISTANT

## 2023-05-05 PROCEDURE — 87186 SC STD MICRODIL/AGAR DIL: CPT | Performed by: PHYSICIAN ASSISTANT

## 2023-05-05 PROCEDURE — 87086 URINE CULTURE/COLONY COUNT: CPT | Performed by: PHYSICIAN ASSISTANT

## 2023-06-13 ENCOUNTER — TELEPHONE (OUTPATIENT)
Dept: ONCOLOGY | Facility: CLINIC | Age: 68
End: 2023-06-13
Payer: MEDICARE

## 2023-06-13 NOTE — TELEPHONE ENCOUNTER
Caller: Mary Ann Michaels    Relationship to patient: Self    Best call back number: [phone]    Patient is needing: TO CHANGE 6-20-23 LAB  AND F/U APPT TO ANOTHER DAY IN THE AFTERNOON.

## 2023-06-16 NOTE — TELEPHONE ENCOUNTER
Caller: Mary Ann Michaels     Relationship to patient: Self     Best call back number: [phone]     Patient is needing: TO CHANGE 7/25/2023 VITALS AND F/U APPT TO ANOTHER DAY IN THE AFTERNOON.   CALL TO R/S

## 2023-09-14 ENCOUNTER — OFFICE (AMBULATORY)
Dept: URBAN - METROPOLITAN AREA PATHOLOGY 4 | Facility: PATHOLOGY | Age: 68
End: 2023-09-14

## 2023-09-14 ENCOUNTER — ON CAMPUS - OUTPATIENT (AMBULATORY)
Dept: URBAN - METROPOLITAN AREA HOSPITAL 2 | Facility: HOSPITAL | Age: 68
End: 2023-09-14

## 2023-09-14 ENCOUNTER — OFFICE (AMBULATORY)
Dept: URBAN - METROPOLITAN AREA PATHOLOGY 4 | Facility: PATHOLOGY | Age: 68
End: 2023-09-14
Payer: COMMERCIAL

## 2023-09-14 VITALS
RESPIRATION RATE: 19 BRPM | HEART RATE: 57 BPM | SYSTOLIC BLOOD PRESSURE: 144 MMHG | OXYGEN SATURATION: 98 % | SYSTOLIC BLOOD PRESSURE: 176 MMHG | OXYGEN SATURATION: 95 % | HEIGHT: 65 IN | HEART RATE: 56 BPM | SYSTOLIC BLOOD PRESSURE: 124 MMHG | OXYGEN SATURATION: 100 % | RESPIRATION RATE: 16 BRPM | TEMPERATURE: 97.8 F | SYSTOLIC BLOOD PRESSURE: 127 MMHG | DIASTOLIC BLOOD PRESSURE: 45 MMHG | DIASTOLIC BLOOD PRESSURE: 56 MMHG | HEART RATE: 53 BPM | RESPIRATION RATE: 18 BRPM | DIASTOLIC BLOOD PRESSURE: 65 MMHG | DIASTOLIC BLOOD PRESSURE: 93 MMHG | SYSTOLIC BLOOD PRESSURE: 102 MMHG | OXYGEN SATURATION: 97 % | SYSTOLIC BLOOD PRESSURE: 116 MMHG | SYSTOLIC BLOOD PRESSURE: 107 MMHG | DIASTOLIC BLOOD PRESSURE: 61 MMHG | WEIGHT: 253 LBS | RESPIRATION RATE: 17 BRPM | DIASTOLIC BLOOD PRESSURE: 62 MMHG | DIASTOLIC BLOOD PRESSURE: 57 MMHG | RESPIRATION RATE: 164 BRPM | HEART RATE: 66 BPM | OXYGEN SATURATION: 99 % | SYSTOLIC BLOOD PRESSURE: 145 MMHG | HEART RATE: 55 BPM | DIASTOLIC BLOOD PRESSURE: 49 MMHG

## 2023-09-14 DIAGNOSIS — D12.4 BENIGN NEOPLASM OF DESCENDING COLON: ICD-10-CM

## 2023-09-14 DIAGNOSIS — D12.2 BENIGN NEOPLASM OF ASCENDING COLON: ICD-10-CM

## 2023-09-14 DIAGNOSIS — Z09 ENCOUNTER FOR FOLLOW-UP EXAMINATION AFTER COMPLETED TREATMEN: ICD-10-CM

## 2023-09-14 DIAGNOSIS — Z86.010 PERSONAL HISTORY OF COLONIC POLYPS: ICD-10-CM

## 2023-09-14 PROBLEM — K63.5 POLYP OF COLON: Status: ACTIVE | Noted: 2023-09-14

## 2023-09-14 LAB
GI HISTOLOGY: A. UNSPECIFIED: (no result)
GI HISTOLOGY: B. UNSPECIFIED: (no result)
GI HISTOLOGY: C. UNSPECIFIED: (no result)
GI HISTOLOGY: PDF REPORT: (no result)

## 2023-09-14 PROCEDURE — 88305 TISSUE EXAM BY PATHOLOGIST: CPT | Mod: 26 | Performed by: INTERNAL MEDICINE

## 2023-09-14 PROCEDURE — 45385 COLONOSCOPY W/LESION REMOVAL: CPT | Mod: PT | Performed by: INTERNAL MEDICINE

## 2023-09-15 NOTE — PROGRESS NOTES
RADIATION ONCOLOGY FOLLOW-UP NOTE    NAME: Mary Ann Michaels  YOB: 1955  MRN #: [id number]  DATE OF SERVICE: 9/19/2023  PRIMARY CARE PROVIDER: Tasia Siu APRN    DIAGNOSIS:  Left breast cancer  -Invasive lobular carcinoma, [id number]  ER/[place] strongly positive and Her2 negative with a low Ki-67, grade 1    REASON FOR VISIT/CC:  Left breast cancer, 6M F/U    RADIATION TREATMENT COURSE:  5000 cGy in 25 fractions to left cw, completed 07/18/2022.    HISTORY OF PRESENT ILLNESS: The patient is a 68 y.o. year old female who was last seen in our office on 02/24/2023. The plan was to continue Arimidex with Dr. Dhillon, continue vitamin E oil based massages, and follow up here in 6 months.    She was last seen by Dr. Dhillon on 02/28/2023. Their plan is to continue with anastrozole, repeat DEXA December 2023, mammogram due in December 2023, and follow up in 4 months. She was scheduled for follow up on 07/25/2023, then rescheduled for 08/08/2023, and she cancelled both appointments. She is currently not scheduled for follow up.    No interval imaging, but mammogram is scheduled for 12/18/2023 at [facility].    No new CW/rib pain, no CW masses, no regional adenopathy. Good range of motion, denies lymphedema.    The following portions of the patient's history were reviewed and updated as appropriate: allergies, current medications, past family history, past medical history, past social history, past surgical history and problem list. Reviewed with the patient and remain unchanged.    PAST MEDICAL HISTORY:  she has a past medical history of Anxiety and depression, Female stress incontinence, GERD (gastroesophageal reflux disease), Hiatal hernia, History of left breast cancer (12/2021), History of radiation therapy (06/2022), Hypertension, Insomnia, and Sleep apnea.    MEDICATIONS:    Current Outpatient Medications:     acetaminophen (TYLENOL) 325 MG tablet, Take 1 tablet by mouth Every 4 (Four)  Hours As Needed for Mild Pain., Disp: 30 tablet, Rfl: 1    anastrozole (ARIMIDEX) 1 MG tablet, TAKE ONE TABLET BY MOUTH DAILY, Disp: 90 tablet, Rfl: 3    citalopram (CeleXA) 40 MG tablet, Take 1 tablet by mouth daily. (Patient taking differently: Take 1 tablet by mouth Every Night.), Disp: 30 tablet, Rfl: 5    citalopram (CeleXA) 40 MG tablet, Take 1 tablet by mouth Daily., Disp: , Rfl:     lisinopril (PRINIVIL,ZESTRIL) 10 MG tablet, , Disp: , Rfl:     lisinopril (PRINIVIL,ZESTRIL) 10 MG tablet, Take 1 tablet by mouth Daily., Disp: 30 tablet, Rfl: 11    propranolol LA (INDERAL LA) 160 MG 24 hr capsule, Take 1 capsule by mouth Every Night., Disp: 90 capsule, Rfl: 3    propranolol LA (INDERAL LA) 160 MG 24 hr capsule, Take 1 capsule by mouth Every Night., Disp: , Rfl:     zolpidem (AMBIEN) 5 MG tablet, Take 1 tablet by mouth nightly as needed for Sleep., Disp: 30 tablet, Rfl: 2    zolpidem (AMBIEN) 5 MG tablet, Take 1 tablet by mouth At Night As Needed., Disp: , Rfl:     ALLERGIES:  No Known Allergies    PAST SURGICAL HISTORY:  she has a past surgical history that includes Hysterectomy (); Cholecystectomy; Knee surgery (Right);  section; Mastectomy w/ sentinel node biopsy (Left, 2022); Breast reconstruction (Left, 2022); Colonoscopy (); Esophagogastroduodenoscopy; Cataract extraction w/ intraocular lens implant (Bilateral); Breast Tissue Expander Removal (Left, 2023); Fat Grafting (Left, 2023); and Breast Reduction (Right, 2023).    PREVIOUS RADIOTHERAPY OR CHEMOTHERAPY:  XRT as noted.    FAMILY HISTORY:  herfamily history includes Liver cancer in her brother and brother; Lung cancer in her father; Ovarian cancer (age of onset: 67) in her mother; Pancreatic cancer in her maternal grandmother.    SOCIAL HISTORY:  she reports that she has never smoked. She has never been exposed to tobacco smoke. She has never used smokeless tobacco. She reports that she does not drink alcohol and  does not use drugs.    PAIN AND PAIN MANAGEMENT:  denies pain.    NUTRITIONAL STATUS:  no issues    KPS:  90:  Minor signs or symptoms      REVIEW OF SYSTEMS:   General: No fevers, chills, weight change, or drenching night sweats. Skin: No rashes or jaundice.  HEENT: No change in vision or hearing, no headaches.  Neck: No dysphagia or masses.  Heme/Lymph: No easy bruising or bleeding.  Respiratory System: No shortness of breath or cough.  Cardiovascular: No chest pain, palpitations, or dyspnea on exertion.  - Pacemaker. GI: No nausea, vomiting, diarrhea, melena, or hematochezia.  : No dysuria or hematuria.  Endocrine: No heat or cold intolerance. Musculoskeletal: No myalgias or arthralgias.  Neuro: No weakness, numbness, syncope, or seizures. Psych: No mood changes or depression. Ext: Denies swelling.    Objective   VITAL SIGNS:  Vitals:    09/19/23 1424   BP: 131/79   Pulse: 71   Resp: 18   Temp: 98.3 °F (36.8 °C)   SpO2: 96%       PHYSICAL EXAM:  GENERAL: In no apparent distress, sitting comfortably in room.    LYMPHATIC: No cervical, supraclavicular or axillary adenopathy appreciated bilaterally.   CARDIOVASCULAR: S1 & S2 detected; no murmurs, rubs or gallops.  CHEST: Clear to auscultation bilaterally; no wheezes, crackles or rubs. Work of breathing normal.  MUSCULOSKELETAL: Normal range of motion.  EXTREMITIES: No lymphedema.  SKIN: No erythema, rashes, ulcerations noted.   NEUROLOGIC: Cranial nerves II-XII grossly intact bilaterally. No focal neurologic deficits.  PSYCHIATRIC:  Alert, aware, and appropriate.  BREASTS/CW: cNED, no palpable masses, no new skin  changes, discharge or pain.    PERTINENT IMAGING/PATHOLOGY/LABS (Medical Decision Making):     COORDINATION OF CARE: A copy of this note is sent to the referring provider.    PATHOLOGY (Reviewed):     IMAGING (Reviewed):     LABS (Reviewed):  HEMATOLOGY:  WBC   Date Value Ref Range Status   02/28/2023 5.92 3.40 - 10.80 10*3/mm3 Final   02/26/2019 5.8 4.5  - 11.0 10*3/uL Final     RBC   Date Value Ref Range Status   02/28/2023 4.54 3.77 - 5.28 10*6/mm3 Final   02/26/2019 4.68 4.0 - 6.0 10*6/uL Final     Hemoglobin   Date Value Ref Range Status   02/28/2023 13.9 12.0 - 15.9 g/dL Final   02/26/2019 14.6 12.0 - 16.0 g/dL Final     Hematocrit   Date Value Ref Range Status   02/28/2023 41.3 34.0 - 46.6 % Final   02/26/2019 42.2 33.0 - 51.0 % Final     Platelets   Date Value Ref Range Status   02/28/2023 160 140 - 450 10*3/mm3 Final   02/26/2019 153 150 - 450 10*3/uL Final     CHEMISTRY:  Lab Results   Component Value Date    GLUCOSE 108 02/28/2023    BUN 21 (H) 02/28/2023    CREATININE 1.00 02/28/2023    BCR 21.0 02/28/2023    K 4.3 02/28/2023    CO2 27.5 02/28/2023    CALCIUM 10.2 02/28/2023    ALBUMIN 4.2 02/28/2023    LABIL2 1.2 12/09/2021    AST 29 02/28/2023    ALT 31 02/28/2023     Assessment & Plan   ASSESSMENT AND PLAN:    DIAGNOSIS:  Left breast cancer  -Invasive lobular carcinoma, [id number]  ER/NV strongly positive and Her2 negative with a low Ki-67, grade 1    -Continues Arimidex  Vit E oil based massages Left axillary/axillary tail region    FU here 12/2024 or earlier as needed; will likely discharge to med/onc at that time.      This assessment comes from my review of the imaging, pathology, physician notes and other pertinent information as mentioned.        TIME SPENT WITH PATIENT:   I spent 21 minutes caring for Mary Ann on this date of service. This time includes time spent by me in the following activities: preparing for the visit, reviewing tests, obtaining and/or reviewing a separately obtained history, performing a medically appropriate examination and/or evaluation, counseling and educating the patient/family/caregiver, and documenting information in the medical record    CC: SAMANTA Rodney MD Mounika Mandadi, MD

## 2023-09-18 ENCOUNTER — HOSPITAL ENCOUNTER (OUTPATIENT)
Dept: RADIATION ONCOLOGY | Facility: HOSPITAL | Age: 68
Setting detail: RADIATION/ONCOLOGY SERIES
End: 2023-09-18
Payer: MEDICARE

## 2023-09-19 ENCOUNTER — OFFICE VISIT (OUTPATIENT)
Dept: RADIATION ONCOLOGY | Facility: HOSPITAL | Age: 68
End: 2023-09-19

## 2023-09-19 VITALS
HEIGHT: 65 IN | HEART RATE: 71 BPM | BODY MASS INDEX: 40.37 KG/M2 | SYSTOLIC BLOOD PRESSURE: 131 MMHG | RESPIRATION RATE: 18 BRPM | DIASTOLIC BLOOD PRESSURE: 79 MMHG | TEMPERATURE: 98.3 F | WEIGHT: 242.3 LBS | OXYGEN SATURATION: 96 %

## 2023-09-19 DIAGNOSIS — Z17.0 MALIGNANT NEOPLASM OF UPPER-OUTER QUADRANT OF LEFT BREAST IN FEMALE, ESTROGEN RECEPTOR POSITIVE: Primary | ICD-10-CM

## 2023-09-19 DIAGNOSIS — C50.412 MALIGNANT NEOPLASM OF UPPER-OUTER QUADRANT OF LEFT BREAST IN FEMALE, ESTROGEN RECEPTOR POSITIVE: Primary | ICD-10-CM

## 2023-09-19 PROCEDURE — G0463 HOSPITAL OUTPT CLINIC VISIT: HCPCS | Performed by: RADIOLOGY

## 2023-10-04 ENCOUNTER — HOSPITAL ENCOUNTER (OUTPATIENT)
Dept: PHYSICAL THERAPY | Facility: HOSPITAL | Age: 68
Setting detail: THERAPIES SERIES
Discharge: HOME | End: 2023-10-04
Payer: MEDICARE

## 2023-10-04 DIAGNOSIS — Z17.0 MALIGNANT NEOPLASM OF UPPER-OUTER QUADRANT OF LEFT BREAST IN FEMALE, ESTROGEN RECEPTOR POSITIVE: ICD-10-CM

## 2023-10-04 DIAGNOSIS — Z90.12 S/P MASTECTOMY, LEFT: Primary | ICD-10-CM

## 2023-10-04 DIAGNOSIS — Z91.89 AT RISK FOR LYMPHEDEMA: ICD-10-CM

## 2023-10-04 DIAGNOSIS — C50.412 MALIGNANT NEOPLASM OF UPPER-OUTER QUADRANT OF LEFT BREAST IN FEMALE, ESTROGEN RECEPTOR POSITIVE: ICD-10-CM

## 2023-10-04 PROCEDURE — 93702 BIS XTRACELL FLUID ANALYSIS: CPT

## 2023-10-04 NOTE — THERAPY RE-EVALUATION
Physical Therapy Lymphedema Re-Evaluation  [facility]     Patient Name: Mary Ann Michaels  : 1955  MRN: [id number]  Today's Date: 10/4/2023      Visit Date: 10/04/2023    Visit Dx:    ICD-10-CM ICD-9-CM   1. S/P mastectomy, left  Z90.12 V45.71   2. Malignant neoplasm of upper-outer quadrant of left breast in female, estrogen receptor positive  C50.412 174.4    Z17.0 V86.0   3. At risk for lymphedema  Z91.89 V49.89       Patient Active Problem List   Diagnosis    Breast cancer of upper-outer quadrant of left female breast        Past Medical History:   Diagnosis Date    Anxiety and depression     Female stress incontinence     GERD (gastroesophageal reflux disease)     Hiatal hernia     History of left breast cancer 2021    History of radiation therapy 2022    25 TREATMENTS    Hypertension     Insomnia     Sleep apnea     CPAP        Past Surgical History:   Procedure Laterality Date    BILATERAL BREAST REDUCTION Right 2023    Procedure: RIGHT REDUCTION FOR SYMMETRY;  Surgeon: Raj Montgomery MD;  Location: [facility];  Service: Plastics;  Laterality: Right;    BREAST RECONSTRUCTION Left 2022    Procedure: LEFT PLACEMENT TISSUE EXPANDER AND ALLODERM;  Surgeon: Raj Montgomery MD;  Location: [facility] OR;  Service: Plastics;  Laterality: Left;    BREAST TISSUE EXPANDER REMOVAL INSERTION OF IMPLANT Left 2023    Procedure: LEFT REMOVAL JTISSUE EXPANDER AND PLACEMENT OF IMPLANT;  Surgeon: Raj Montgomery MD;  Location: [facility] OR;  Service: Plastics;  Laterality: Left;    CATARACT EXTRACTION W/ INTRAOCULAR LENS IMPLANT Bilateral     1-3-2023    1-     SECTION          CHOLECYSTECTOMY      COLONOSCOPY      ENDOSCOPY      FAT GRAFTING Left 2023    Procedure: FAT GRAFTING;  Surgeon: Raj Montgomery MD;  Location: [facility];  Service: Plastics;  Laterality: Left;    HYSTERECTOMY      KNEE SURGERY Right     ARTHROSCOPY  TENDON REPAIR    MASTECTOMY W/ SENTINEL NODE BIOPSY Left 03/30/2022    Procedure: left total mastectomy with left axillary sentinel lymph node biopsy;  Surgeon: Catherine Isaac MD;  Location: [facility];  Service: General;  Laterality: Left;       Visit Dx:    ICD-10-CM ICD-9-CM   1. S/P mastectomy, left  Z90.12 V45.71   2. Malignant neoplasm of upper-outer quadrant of left breast in female, estrogen receptor positive  C50.412 174.4    Z17.0 V86.0   3. At risk for lymphedema  Z91.89 V49.89            Lymphedema       Row Name 10/04/23 1300             Subjective Pain    Able to rate subjective pain? yes  -LB      Pre-Treatment Pain Level 0  -LB         Subjective    Subjective Comments I am doing well, no issues with swelling.  -LB         Lymphedema Assessment    Lymphedema Classification LUE:;at risk/stage 0  -LB      Lymphedema Cancer Related Sx left;radical mastectomy;lumpectomy  -LB      Lymph Nodes Removed # 7  -LB      Positive Lymph Nodes # 0  -LB      Chemo Received no  -LB      Radiation Therapy Received yes  -LB      Infections or Cellulitis? yes  -LB         L-Dex Bioimpedence Screening    L-Dex Measurement Extremity LUE  -LB      L-Dex Patient Position Standing  -LB      L-Dex UE Dominate Side Right  -LB      L-Dex UE At Risk Side Left  -LB      L-Dex UE Pre Surgical Value No  -LB      L-Dex UE Score 2.3  -LB      L-Dex UE Baseline Score 1.5  -LB      L-Dex UE Value Change 0.8  -LB      L-Dex UE Comment WNL; stable  -LB      $ L-Dex Charge yes  -LB                User Key  (r) = Recorded By, (t) = Taken By, (c) = Cosigned By      Initials Name Provider Type    Cheli Devlin, PT Physical Therapist                                    Therapy Education  Education Details: reviewed s/s of lymphedema, steps to prevention, use of compression garment as needed  Given: Symptoms/condition management, Edema management  Program: Reinforced  How Provided: Verbal  Provided to: Patient  Level of  Understanding: Verbalized       OP Exercises       Row Name 10/04/23 1300             Subjective    Subjective Comments I am doing well, no issues with swelling.  -LB         Subjective Pain    Able to rate subjective pain? yes  -LB      Pre-Treatment Pain Level 0  -LB                User Key  (r) = Recorded By, (t) = Taken By, (c) = Cosigned By      Initials Name Provider Type    Cheli Devlin, PT Physical Therapist                                 PT OP Goals       Row Name 10/04/23 1300          Long Term Goals    LTG Date to Achieve 06/12/22  -LB     LTG 1 Pt will maintain LDex bioimpedance WNL.  -LB     LTG 1 Progress Ongoing  -LB     LTG 1 Progress Comments WNL; stable today 2.3  -LB     LTG 2 Pt will acquire appropriately fitted compression garment and verbalize appropriate wear schedule.  -LB     LTG 2 Progress Met  -LB     LTG 3 Pt will demonstrate grossly 120 deg flexion/abduction LUE to improve her tolerane to radiation.  -LB     LTG 3 Progress Met  -LB     LTG 4 Pt will report reduced tightness and discomfort L axilla to minimal.  -LB     LTG 4 Progress Met  -LB               User Key  (r) = Recorded By, (t) = Taken By, (c) = Cosigned By      Initials Name Provider Type    Cheli Devlin, PT Physical Therapist                     PT Assessment/Plan       Row Name 10/04/23 1343          PT Assessment    Functional Limitations Other (comment)  at risk for lymphedema  -LB     Impairments Impaired flexibility;Impaired lymphatic circulation;Posture;Range of motion;Sensation  -LB     Assessment Comments Mary Ann Michaels is a 67 y.o. female who returns for 6 month f/u biompedance spectroscopy diagnostic assessment of lymphatics of the upper extremities due to increased risk of post mastectomy lymphedema Left upper extremity due to history of s/p Left Modified Radical Mastectomy with [place] Node Biopsy with immediate reconstruction prepectoral tissue expander performed on 3/30/22 by surgeons   Poncho/Ulises. Mary Ann Michaels is at increased risk of post mastectomy lymphedema syndrome Left Upper Extremity due to Radiation therapy Breast surgery Lymph Node Removal. Current L-Dex score is 2.3, which is WNL and stable from last reading. Pt did well with independent strengthening program and denies s/s of lymphedema or issues with LUE. We reviewed monitoring for symptoms and use of compression garment as needed. We will return in 6 months to assess response to management. Pt remains appropriate for continued skilled PT services to monitor for lymphedema and progress strengthening as needed.  -LB     Rehab Potential Good  -LB     Patient/caregiver participated in establishment of treatment plan and goals Yes  -LB     Patient would benefit from skilled therapy intervention Yes  -LB        PT Plan    PT Frequency Other (comment)  -LB     Predicted Duration of Therapy Intervention (PT) repeat bioimpedance in 6 months  -LB     Planned CPT's? PT EVAL LOW COMPLEXITY: 37951;PT RE-EVAL: 20454;PT THER PROC EA 15 MIN: 43899;PT THER ACT EA 15 MIN: 52215;PT MANUAL THERAPY EA 15 MIN: 45795;PT SELF CARE/HOME MGMT/TRAIN EA 15: 08735;PT BIS XTRACELL FLUID ANALYSIS: 37700  -LB     PT Plan Comments repeat bioimpedance in 6 months  -LB               User Key  (r) = Recorded By, (t) = Taken By, (c) = Cosigned By      Initials Name Provider Type    Cheli Devlin PT Physical Therapist                                 Time Calculation:   Start Time: 1315  Stop Time: 1330  Time Calculation (min): 15 min   Therapy Charges for Today       Code Description Service Date Service Provider Modifiers Qty    [id number]  PT BIS XTRACELL FLUID ANALYSIS 10/4/2023 Cheli Tracey PT  1                      Cheli Tracey PT  10/4/2023

## 2023-11-20 ENCOUNTER — APPOINTMENT (OUTPATIENT)
Dept: GENERAL RADIOLOGY | Facility: HOSPITAL | Age: 68
End: 2023-11-20
Payer: MEDICARE

## 2023-11-20 ENCOUNTER — APPOINTMENT (OUTPATIENT)
Dept: CT IMAGING | Facility: HOSPITAL | Age: 68
End: 2023-11-20
Payer: MEDICARE

## 2023-11-20 ENCOUNTER — HOSPITAL ENCOUNTER (INPATIENT)
Facility: HOSPITAL | Age: 68
LOS: 3 days | Discharge: HOME | End: 2023-11-23
Attending: EMERGENCY MEDICINE | Admitting: STUDENT IN AN ORGANIZED HEALTH CARE EDUCATION/TRAINING PROGRAM
Payer: MEDICARE

## 2023-11-20 DIAGNOSIS — R06.00 DYSPNEA, UNSPECIFIED TYPE: ICD-10-CM

## 2023-11-20 DIAGNOSIS — R09.02 HYPOXIA: ICD-10-CM

## 2023-11-20 DIAGNOSIS — J18.9 PNEUMONIA DUE TO INFECTIOUS ORGANISM, UNSPECIFIED LATERALITY, UNSPECIFIED PART OF LUNG: ICD-10-CM

## 2023-11-20 DIAGNOSIS — J18.9 MULTIFOCAL PNEUMONIA: Primary | ICD-10-CM

## 2023-11-20 PROBLEM — R50.9 FEVER: Status: ACTIVE | Noted: 2023-11-20

## 2023-11-20 PROBLEM — R79.89 ELEVATED TROPONIN: Status: ACTIVE | Noted: 2023-11-20

## 2023-11-20 PROBLEM — R79.89 ELEVATED BRAIN NATRIURETIC PEPTIDE (BNP) LEVEL: Status: ACTIVE | Noted: 2023-11-20

## 2023-11-20 PROBLEM — E66.01 MORBID OBESITY: Status: ACTIVE | Noted: 2023-11-20

## 2023-11-20 PROBLEM — F41.9 ANXIETY DISORDER: Status: ACTIVE | Noted: 2023-11-20

## 2023-11-20 PROBLEM — I10 ESSENTIAL HYPERTENSION: Status: ACTIVE | Noted: 2023-11-20

## 2023-11-20 LAB
ALBUMIN SERPL-MCNC: 3.4 G/DL (ref 3.5–5.2)
ALBUMIN/GLOB SERPL: 1 G/DL
ALP SERPL-CCNC: 97 U/L (ref 39–117)
ALT SERPL W P-5'-P-CCNC: 21 U/L (ref 1–33)
ANION GAP SERPL CALCULATED.3IONS-SCNC: 10 MMOL/L (ref 5–15)
ANISOCYTOSIS BLD QL: ABNORMAL
AST SERPL-CCNC: 23 U/L (ref 1–32)
B PARAPERT DNA SPEC QL NAA+PROBE: NOT DETECTED
B PERT DNA SPEC QL NAA+PROBE: NOT DETECTED
BASOPHILS # BLD MANUAL: 0.09 10*3/MM3 (ref 0–0.2)
BASOPHILS NFR BLD MANUAL: 1 % (ref 0–1.5)
BILIRUB SERPL-MCNC: 0.3 MG/DL (ref 0–1.2)
BUN SERPL-MCNC: 27 MG/DL (ref 8–23)
BUN/CREAT SERPL: 35.1 (ref 7–25)
C PNEUM DNA NPH QL NAA+NON-PROBE: NOT DETECTED
CALCIUM SPEC-SCNC: 9.8 MG/DL (ref 8.6–10.5)
CHLORIDE SERPL-SCNC: 103 MMOL/L (ref 98–107)
CO2 SERPL-SCNC: 31 MMOL/L (ref 22–29)
CREAT SERPL-MCNC: 0.77 MG/DL (ref 0.57–1)
D DIMER PPP FEU-MCNC: 1.6 MG/L (FEU) (ref 0–0.69)
DEPRECATED RDW RBC AUTO: 44.6 FL (ref 37–54)
EGFRCR SERPLBLD CKD-EPI 2021: 83.6 ML/MIN/1.73
EOSINOPHIL # BLD MANUAL: 0.09 10*3/MM3 (ref 0–0.4)
EOSINOPHIL NFR BLD MANUAL: 1 % (ref 0.3–6.2)
ERYTHROCYTE [DISTWIDTH] IN BLOOD BY AUTOMATED COUNT: 14.3 % (ref 12.3–15.4)
FLUAV SUBTYP SPEC NAA+PROBE: NOT DETECTED
FLUBV RNA ISLT QL NAA+PROBE: NOT DETECTED
GLOBULIN UR ELPH-MCNC: 3.4 GM/DL
GLUCOSE SERPL-MCNC: 99 MG/DL (ref 65–99)
HADV DNA SPEC NAA+PROBE: NOT DETECTED
HCOV 229E RNA SPEC QL NAA+PROBE: NOT DETECTED
HCOV HKU1 RNA SPEC QL NAA+PROBE: NOT DETECTED
HCOV NL63 RNA SPEC QL NAA+PROBE: NOT DETECTED
HCOV OC43 RNA SPEC QL NAA+PROBE: NOT DETECTED
HCT VFR BLD AUTO: 39.9 % (ref 34–46.6)
HGB BLD-MCNC: 13.8 G/DL (ref 12–15.9)
HMPV RNA NPH QL NAA+NON-PROBE: NOT DETECTED
HOLD SPECIMEN: NORMAL
HOLD SPECIMEN: NORMAL
HPIV1 RNA ISLT QL NAA+PROBE: NOT DETECTED
HPIV2 RNA SPEC QL NAA+PROBE: NOT DETECTED
HPIV3 RNA NPH QL NAA+PROBE: NOT DETECTED
HPIV4 P GENE NPH QL NAA+PROBE: NOT DETECTED
L PNEUMO1 AG UR QL IA: NEGATIVE
LYMPHOCYTES # BLD MANUAL: 1.07 10*3/MM3 (ref 0.7–3.1)
LYMPHOCYTES NFR BLD MANUAL: 8 % (ref 5–12)
M PNEUMO IGG SER IA-ACNC: NOT DETECTED
MCH RBC QN AUTO: 30.6 PG (ref 26.6–33)
MCHC RBC AUTO-ENTMCNC: 34.5 G/DL (ref 31.5–35.7)
MCV RBC AUTO: 88.7 FL (ref 79–97)
MONOCYTES # BLD: 0.71 10*3/MM3 (ref 0.1–0.9)
MRSA DNA SPEC QL NAA+PROBE: NORMAL
NEUTROPHILS # BLD AUTO: 6.94 10*3/MM3 (ref 1.7–7)
NEUTROPHILS NFR BLD MANUAL: 78 % (ref 42.7–76)
NT-PROBNP SERPL-MCNC: 2805 PG/ML (ref 0–900)
PLAT MORPH BLD: NORMAL
PLATELET # BLD AUTO: 247 10*3/MM3 (ref 140–450)
PMV BLD AUTO: 8.7 FL (ref 6–12)
POIKILOCYTOSIS BLD QL SMEAR: ABNORMAL
POTASSIUM SERPL-SCNC: 3.7 MMOL/L (ref 3.5–5.2)
PROCALCITONIN SERPL-MCNC: 0.07 NG/ML (ref 0–0.25)
PROT SERPL-MCNC: 6.8 G/DL (ref 6–8.5)
RBC # BLD AUTO: 4.5 10*6/MM3 (ref 3.77–5.28)
RHINOVIRUS RNA SPEC NAA+PROBE: NOT DETECTED
RSV RNA NPH QL NAA+NON-PROBE: NOT DETECTED
S PNEUM AG SPEC QL LA: NEGATIVE
SARS-COV-2 RNA NPH QL NAA+NON-PROBE: NOT DETECTED
SCAN SLIDE: NORMAL
SODIUM SERPL-SCNC: 144 MMOL/L (ref 136–145)
TROPONIN T SERPL HS-MCNC: 17 NG/L
VARIANT LYMPHS NFR BLD MANUAL: 12 % (ref 19.6–45.3)
WBC MORPH BLD: NORMAL
WBC NRBC COR # BLD AUTO: 8.9 10*3/MM3 (ref 3.4–10.8)
WHOLE BLOOD HOLD COAG: NORMAL
WHOLE BLOOD HOLD SPECIMEN: NORMAL

## 2023-11-20 PROCEDURE — 25010000002 CEFTRIAXONE PER 250 MG: Performed by: PHYSICIAN ASSISTANT

## 2023-11-20 PROCEDURE — 0202U NFCT DS 22 TRGT SARS-COV-2: CPT | Performed by: PHYSICIAN ASSISTANT

## 2023-11-20 PROCEDURE — 80053 COMPREHEN METABOLIC PANEL: CPT | Performed by: PHYSICIAN ASSISTANT

## 2023-11-20 PROCEDURE — 25810000003 SODIUM CHLORIDE 0.9 % SOLUTION: Performed by: NURSE PRACTITIONER

## 2023-11-20 PROCEDURE — 83880 ASSAY OF NATRIURETIC PEPTIDE: CPT | Performed by: PHYSICIAN ASSISTANT

## 2023-11-20 PROCEDURE — 99285 EMERGENCY DEPT VISIT HI MDM: CPT

## 2023-11-20 PROCEDURE — 25510000001 IOPAMIDOL PER 1 ML: Performed by: EMERGENCY MEDICINE

## 2023-11-20 PROCEDURE — 84145 PROCALCITONIN (PCT): CPT | Performed by: PHYSICIAN ASSISTANT

## 2023-11-20 PROCEDURE — 93005 ELECTROCARDIOGRAM TRACING: CPT | Performed by: EMERGENCY MEDICINE

## 2023-11-20 PROCEDURE — 87641 MR-STAPH DNA AMP PROBE: CPT | Performed by: PHYSICIAN ASSISTANT

## 2023-11-20 PROCEDURE — 71275 CT ANGIOGRAPHY CHEST: CPT

## 2023-11-20 PROCEDURE — 85007 BL SMEAR W/DIFF WBC COUNT: CPT | Performed by: PHYSICIAN ASSISTANT

## 2023-11-20 PROCEDURE — 71045 X-RAY EXAM CHEST 1 VIEW: CPT

## 2023-11-20 PROCEDURE — 87449 NOS EACH ORGANISM AG IA: CPT | Performed by: PHYSICIAN ASSISTANT

## 2023-11-20 PROCEDURE — 85025 COMPLETE CBC W/AUTO DIFF WBC: CPT | Performed by: PHYSICIAN ASSISTANT

## 2023-11-20 PROCEDURE — 84484 ASSAY OF TROPONIN QUANT: CPT | Performed by: PHYSICIAN ASSISTANT

## 2023-11-20 PROCEDURE — 87040 BLOOD CULTURE FOR BACTERIA: CPT | Performed by: PHYSICIAN ASSISTANT

## 2023-11-20 PROCEDURE — 25010000002 FUROSEMIDE PER 20 MG: Performed by: NURSE PRACTITIONER

## 2023-11-20 PROCEDURE — 93005 ELECTROCARDIOGRAM TRACING: CPT

## 2023-11-20 PROCEDURE — 36415 COLL VENOUS BLD VENIPUNCTURE: CPT | Performed by: PHYSICIAN ASSISTANT

## 2023-11-20 PROCEDURE — 85379 FIBRIN DEGRADATION QUANT: CPT | Performed by: PHYSICIAN ASSISTANT

## 2023-11-20 RX ORDER — ACETAMINOPHEN 325 MG/1
650 TABLET ORAL EVERY 6 HOURS PRN
Status: DISCONTINUED | OUTPATIENT
Start: 2023-11-20 | End: 2023-11-23 | Stop reason: HOSPADM

## 2023-11-20 RX ORDER — SODIUM CHLORIDE 0.9 % (FLUSH) 0.9 %
10 SYRINGE (ML) INJECTION AS NEEDED
Status: DISCONTINUED | OUTPATIENT
Start: 2023-11-20 | End: 2023-11-23 | Stop reason: HOSPADM

## 2023-11-20 RX ORDER — FUROSEMIDE 10 MG/ML
40 INJECTION INTRAMUSCULAR; INTRAVENOUS ONCE
Status: COMPLETED | OUTPATIENT
Start: 2023-11-20 | End: 2023-11-20

## 2023-11-20 RX ORDER — METHYLPREDNISOLONE 4 MG/1
4 TABLET ORAL DAILY
COMMUNITY

## 2023-11-20 RX ORDER — SODIUM CHLORIDE 9 MG/ML
50 INJECTION, SOLUTION INTRAVENOUS CONTINUOUS
Status: DISCONTINUED | OUTPATIENT
Start: 2023-11-20 | End: 2023-11-23 | Stop reason: HOSPADM

## 2023-11-20 RX ORDER — CEFDINIR 300 MG/1
300 CAPSULE ORAL 2 TIMES DAILY
COMMUNITY

## 2023-11-20 RX ORDER — IPRATROPIUM BROMIDE AND ALBUTEROL SULFATE 2.5; .5 MG/3ML; MG/3ML
3 SOLUTION RESPIRATORY (INHALATION) EVERY 4 HOURS PRN
Status: DISCONTINUED | OUTPATIENT
Start: 2023-11-20 | End: 2023-11-23 | Stop reason: HOSPADM

## 2023-11-20 RX ORDER — SODIUM CHLORIDE 9 MG/ML
100 INJECTION, SOLUTION INTRAVENOUS CONTINUOUS
Status: DISCONTINUED | OUTPATIENT
Start: 2023-11-20 | End: 2023-11-20

## 2023-11-20 RX ADMIN — SODIUM CHLORIDE 50 ML/HR: 9 INJECTION, SOLUTION INTRAVENOUS at 20:30

## 2023-11-20 RX ADMIN — FUROSEMIDE 40 MG: 10 INJECTION, SOLUTION INTRAMUSCULAR; INTRAVENOUS at 20:30

## 2023-11-20 RX ADMIN — CEFTRIAXONE 2000 MG: 2 INJECTION, POWDER, FOR SOLUTION INTRAMUSCULAR; INTRAVENOUS at 18:46

## 2023-11-20 RX ADMIN — IOPAMIDOL 100 ML: 755 INJECTION, SOLUTION INTRAVENOUS at 16:40

## 2023-11-20 RX ADMIN — DOXYCYCLINE 100 MG: 100 INJECTION, POWDER, LYOPHILIZED, FOR SOLUTION INTRAVENOUS at 18:46

## 2023-11-20 NOTE — ED PROVIDER NOTES
Subjective   History of Present Illness  Chief Complaint: Shortness of breath    Patient is a 68-year-old  female history of GERD, history of breast cancer hypertension presents to the ER with complaints of shortness of breath cough and generally not feeling well for the last 3 weeks.  She states that she has been to urgent care multiple times, has tested negative for COVID and flu multiple times and has had negative chest x-ray.  She has been placed on albuterol inhaler and steroids.  Most recently about 1 week ago she was placed on cefdinir by PCP.  Today she denies chest pain but does report shortness of breath and productive cough.  No sore throat.  No headache lightheadedness or dizziness.  Intermittent fever and chills.  No abdominal pain nausea vomiting diarrhea.  No body aches.  No lower extremity swelling that is new.  No history of PE or DVT.    PCP: Miranda Siu    History provided by:  Patient      Review of Systems   Constitutional:  Positive for chills, fatigue and fever.   HENT:  Negative for congestion, sore throat and trouble swallowing.    Eyes: Negative.    Respiratory:  Positive for cough and shortness of breath. Negative for chest tightness and wheezing.    Cardiovascular:  Negative for chest pain.   Gastrointestinal:  Negative for abdominal pain, diarrhea, nausea and vomiting.   Endocrine: Negative.    Genitourinary:  Negative for dysuria.   Musculoskeletal:  Negative for myalgias.   Skin:  Negative for rash.   Allergic/Immunologic: Negative.    Neurological:  Negative for light-headedness and headaches.   Psychiatric/Behavioral:  Negative for behavioral problems.    All other systems reviewed and are negative.      Past Medical History:   Diagnosis Date    Anxiety and depression     Female stress incontinence     GERD (gastroesophageal reflux disease)     Hiatal hernia     History of left breast cancer 12/2021    History of radiation therapy 06/2022    25 TREATMENTS    Hypertension      Insomnia     Sleep apnea     CPAP       No Known Allergies    Past Surgical History:   Procedure Laterality Date    BILATERAL BREAST REDUCTION Right 2023    Procedure: RIGHT REDUCTION FOR SYMMETRY;  Surgeon: Raj Montgomery MD;  Location: [facility] MAIN OR;  Service: Plastics;  Laterality: Right;    BREAST RECONSTRUCTION Left 2022    Procedure: LEFT PLACEMENT TISSUE EXPANDER AND ALLODERM;  Surgeon: Raj Montgomery MD;  [facility];  Service: Plastics;  Laterality: Left;    BREAST TISSUE EXPANDER REMOVAL INSERTION OF IMPLANT Left 2023    Procedure: LEFT REMOVAL JTISSUE EXPANDER AND PLACEMENT OF IMPLANT;  Surgeon: Raj Montgomery MD;  Location: [facility] MAIN OR;  Service: Plastics;  Laterality: Left;    CATARACT EXTRACTION W/ INTRAOCULAR LENS IMPLANT Bilateral     1-3-2023    1-     SECTION          CHOLECYSTECTOMY      COLONOSCOPY      ENDOSCOPY      FAT GRAFTING Left 2023    Procedure: FAT GRAFTING;  Surgeon: Raj Montgomery MD;  Location: [facility] MAIN OR;  Service: Plastics;  Laterality: Left;    HYSTERECTOMY      KNEE SURGERY Right     ARTHROSCOPY TENDON REPAIR    MASTECTOMY W/ SENTINEL NODE BIOPSY Left 2022    Procedure: left total mastectomy with left axillary sentinel lymph node biopsy;  Surgeon: Catherine Isaac MD;  Location: [facility] OR;  Service: General;  Laterality: Left;       Family History   Problem Relation Age of Onset    Ovarian cancer Mother 67    Lung cancer Father     Liver cancer Brother     Liver cancer Brother     Pancreatic cancer Maternal Grandmother     Malig Hyperthermia Neg Hx        Social History     Socioeconomic History    Marital status:    Tobacco Use    Smoking status: Never     Passive exposure: Never    Smokeless tobacco: Never   Vaping Use    Vaping Use: Never used   Substance and Sexual Activity    Alcohol use: Never    Drug use: Never    Sexual activity: Defer            Objective   Physical Exam  Vitals and nursing note reviewed.   Constitutional:       Appearance: Normal appearance. She is well-developed. She is obese. She is not ill-appearing or toxic-appearing.   HENT:      Head: Normocephalic and atraumatic.      Nose: Nose normal.      Mouth/Throat:      Mouth: Mucous membranes are moist.      Pharynx: No posterior oropharyngeal erythema.   Eyes:      Pupils: Pupils are equal, round, and reactive to light.   Cardiovascular:      Rate and Rhythm: Normal rate and regular rhythm.      Pulses: Normal pulses.      Heart sounds: Normal heart sounds. No murmur heard.  Pulmonary:      Effort: Pulmonary effort is normal. No respiratory distress.      Breath sounds: Normal breath sounds. No wheezing.   Abdominal:      General: Bowel sounds are normal. There is no distension.      Palpations: Abdomen is soft.      Tenderness: There is no abdominal tenderness. There is no right CVA tenderness or left CVA tenderness.   Musculoskeletal:      Cervical back: Normal range of motion.      Right lower leg: Edema present.      Left lower leg: Edema present.   Skin:     General: Skin is warm and dry.      Capillary Refill: Capillary refill takes less than 2 seconds.      Findings: No rash.   Neurological:      General: No focal deficit present.      Mental Status: She is alert and oriented to person, place, and time.   Psychiatric:         Mood and Affect: Mood normal.         Behavior: Behavior normal.         ECG 12 Lead Dyspnea      Date/Time: 11/20/2023 7:37 PM    Performed by: Jenna Hernandez PA  Authorized by: Jenna Hernandez PA  Interpreted by physician  Comparison: compared with previous ECG   Rhythm: sinus bradycardia  Rate: bradycardic  BPM: 53  QRS axis: normal  Conduction: right bundle branch block  Other findings: LVH  Clinical impression: non-specific ECG               ED Course    /80 (BP Location: Right arm, Patient Position: Lying)   Pulse 57   Temp 97.5 °F  "(36.4 °C) (Oral)   Resp 20   Ht 165.1 cm (65\")   Wt 111 kg (244 lb)   LMP  (LMP Unknown)   SpO2 94%   BMI 40.60 kg/m²   Labs Reviewed   COMPREHENSIVE METABOLIC PANEL - Abnormal; Notable for the following components:       Result Value    BUN 27 (*)     CO2 31.0 (*)     Albumin 3.4 (*)     BUN/Creatinine Ratio 35.1 (*)     All other components within normal limits    Narrative:     GFR Normal >60  Chronic Kidney Disease <60  Kidney Failure <15     BNP (IN-HOUSE) - Abnormal; Notable for the following components:    proBNP 2,805.0 (*)     All other components within normal limits    Narrative:     This assay is used as an aid in the diagnosis of individuals suspected of having heart failure. It can be used as an aid in the diagnosis of acute decompensated heart failure (ADHF) in patients presenting with signs and symptoms of ADHF to the emergency department (ED). In addition, NT-proBNP of <300 pg/mL indicates ADHF is not likely.    Age Range Result Interpretation  NT-proBNP Concentration (pg/mL:      <50             Positive            >450                   Gray                 300-450                    Negative             <300    50-75           Positive            >900                  Gray                300-900                  Negative            <300      >75             Positive            >1800                  Gray                300-1800                  Negative            <300   SINGLE HSTROPONIN T - Abnormal; Notable for the following components:    HS Troponin T 17 (*)     All other components within normal limits    Narrative:     High Sensitive Troponin T Reference Range:  <14.0 ng/L- Negative Female for AMI  <22.0 ng/L- Negative Male for AMI  >=14 - Abnormal Female indicating possible myocardial injury.  >=22 - Abnormal Male indicating possible myocardial injury.   Clinicians would have to utilize clinical acumen, EKG, Troponin, and serial changes to determine if it is an Acute Myocardial " "Infarction or myocardial injury due to an underlying chronic condition.        D-DIMER, QUANTITATIVE - Abnormal; Notable for the following components:    D-Dimer, Quantitative 1.60 (*)     All other components within normal limits    Narrative:     According to the assay 's published package insert, a normal (<0.50 mg/L (FEU)) D-dimer result in conjunction with a non-high clinical probability assessment, excludes deep vein thrombosis (DVT) and pulmonary embolism (PE) with high sensitivity.    D-dimer values increase with age and this can make VTE exclusion of an older population difficult. To address this, the [employer], based on best available evidence and recent guidelines, recommends that clinicians use age-adjusted D-dimer thresholds in patients greater than 50 years of age with: a) a low probability of PE who do not meet all Pulmonary Embolism Rule Out Criteria, or b) in those with intermediate probability of PE.   The formula for an age-adjusted D-dimer cut-off is \"age/100\".  For example, a 60 year old patient would have an age-adjusted cut-off of 0.60 mg/L (FEU) and an 80 year old 0.80 mg/L (FEU).   MANUAL DIFFERENTIAL - Abnormal; Notable for the following components:    Neutrophil % 78.0 (*)     Lymphocyte % 12.0 (*)     All other components within normal limits   RESPIRATORY PANEL PCR W/ COVID-19 (SARS-COV-2), NP SWAB IN UTM/VTP, 2 HR TAT - Normal    Narrative:     In the setting of a positive respiratory panel with a viral infection PLUS a negative procalcitonin without other underlying concern for bacterial infection, consider observing off antibiotics or discontinuation of antibiotics and continue supportive care. If the respiratory panel is positive for atypical bacterial infection (Bordetella pertussis, Chlamydophila pneumoniae, or Mycoplasma pneumoniae), consider antibiotic de-escalation to target atypical bacterial infection.   CBC WITH AUTO DIFFERENTIAL - Normal "   BLOOD CULTURE   BLOOD CULTURE   LEGIONELLA ANTIGEN, URINE   MRSA SCREEN, PCR   STREP PNEUMO AG, URINE OR CSF   RAINBOW DRAW    Narrative:     The following orders were created for panel order [place] Draw.  Procedure                               Abnormality         Status                     ---------                               -----------         ------                     Green Top (Gel)[[id number]]                                  Final result               Lavender Top[[id number]]                                     Final result               Gold Top - SST[[id number]]                                   Final result               Light Blue Top[[id number]]                                   Final result                 Please view results for these tests on the individual orders.   SCAN SLIDE   PROCALCITONIN   CBC AND DIFFERENTIAL    Narrative:     The following orders were created for panel order CBC & Differential.  Procedure                               Abnormality         Status                     ---------                               -----------         ------                     CBC Auto Differential[[id number]]        Normal              Final result               Scan Slide[[id number]]                                       Final result                 Please view results for these tests on the individual orders.   GREEN TOP   LAVENDER TOP   GOLD TOP - SST   LIGHT BLUE TOP     Medications   sodium chloride 0.9 % flush 10 mL (has no administration in time range)   doxycycline (VIBRAMYCIN) 100 mg in sodium chloride 0.9 % 100 mL IVPB (100 mg Intravenous New Bag 11/20/23 1846)   cefTRIAXone (ROCEPHIN) 2,000 mg in sodium chloride 0.9 % 100 mL IVPB (has no administration in time range)   doxycycline (VIBRAMYCIN) 100 mg in sodium chloride 0.9 % 100 mL IVPB (has no administration in time range)   sodium chloride 0.9 % infusion (has no administration in time range)   iopamidol (ISOVUE-370) 76 % injection 100 mL  (100 mL Intravenous Given 11/20/23 1640)   cefTRIAXone (ROCEPHIN) 2,000 mg in sodium chloride 0.9 % 100 mL IVPB (2,000 mg Intravenous New Bag 11/20/23 1846)     CT Angiogram Chest Pulmonary Embolism    Result Date: 11/20/2023  Impression: No definite evidence of pulmonary embolism. Multifocal patchy opacities with areas of consolidation in the lingula and right middle lobe are highly concerning for multifocal/atypical pneumonia. Electronically Signed: Vazquez Solano DO  11/20/2023 4:49 PM EST  Workstation ID: [id number]    XR Chest 1 View    Result Date: 11/20/2023  Impression: 1. Cardiomegaly with evidence of mild pulmonary vascular congestion and interstitial edema. 2. Increased density in the left hemithorax some of which represents airspace disease and some overlying soft tissue density. Recommend a PA and lateral chest radiograph. Electronically Signed: Ron Burnette MD  11/20/2023 3:52 PM EST  Workstation ID: [id number]                                          Medical Decision Making  Differential Dx (Includes but not limited to): Pneumonia, PE, pleural effusion, CHF exacerbation  Medical Records Reviewed: Patient seen in urgent care multiple times prior to ER arrival.  Was seen at PCP office last week and started on cefdinir  Labs: On my interpretation respiratory panel negative.  CBC no leukocytosis.  CMP BUN 27 CO2 31.  Troponin 17, BNP 2805.  Dimer 1.6  Imaging: On my interpretation chest x-ray shows no obvious pneumonia.  Telemetry: EKG interpretation: Reviewed myself interpreted by ER attending, sinus bradycardia rate of 53 with right bundle branch block.  Testing considered but not ordered: CT head patient denies headache or head injury  Nature of Complaint: Acute  Admission vs Discharge: Admission  Discussion: While in the ED IV was placed and labs were obtained appropriate PPE was worn during exam and throughout all encounters with the patient.  Patient had the above evaluation.  She is afebrile  nontoxic appearance and she is in no acute respiratory distress.  O2 saturation fluctuating between 93 to 88% on room air she was placed on 2 L.  Lab significant for a mildly elevated troponin, elevated BNP 2800, elevated D-dimer 1.6.  No leukocytosis chest x-ray negative.  Respiratory swab negative.  CT PE protocol negative for PE but significant for multifocal pneumonia.  Blood cultures obtained and patient will be started on Rocephin and doxycycline.  Patient will be admitted for IV antibiotics and further treatment given her hypoxia and diffuse pneumonia.  I spoke with OXANA Carreon who agreed to accept patient for admission.    Problems Addressed:  Dyspnea, unspecified type: acute illness or injury  Hypoxia: acute illness or injury  Multifocal pneumonia: acute illness or injury    Amount and/or Complexity of Data Reviewed  Labs: ordered. Decision-making details documented in ED Course.  Radiology: ordered. Decision-making details documented in ED Course.  ECG/medicine tests: ordered. Decision-making details documented in ED Course.    Risk  Prescription drug management.  Decision regarding hospitalization.        Final diagnoses:   Multifocal pneumonia   Dyspnea, unspecified type   Hypoxia       ED Disposition  ED Disposition       ED Disposition   Decision to Admit    Condition   --    Comment   Level of Care: Med/Surg [1]   Diagnosis: Fever [137217]   Admitting Physician: TRUDY PÉREZ [[id number]]   Attending Physician: TRUDY PÉREZ [[id number]]   Bed Request Comments: cardiac monitor   Certification: I Certify That Inpatient Hospital Services Are Medically Necessary For Greater Than 2 Midnights                 No follow-up provider specified.       Medication List      No changes were made to your prescriptions during this visit.            Jenna Hernandez PA  11/20/23 1941

## 2023-11-20 NOTE — Clinical Note
Level of Care: Telemetry [5]   Admitting Physician: TRUDY PÉREZ [[id number]]   Attending Physician: TRUDY PÉREZ [[id number]]

## 2023-11-21 ENCOUNTER — APPOINTMENT (OUTPATIENT)
Dept: CARDIOLOGY | Facility: HOSPITAL | Age: 68
End: 2023-11-21
Payer: MEDICARE

## 2023-11-21 PROBLEM — R06.00 DYSPNEA: Status: ACTIVE | Noted: 2023-11-21

## 2023-11-21 LAB
ANION GAP SERPL CALCULATED.3IONS-SCNC: 10 MMOL/L (ref 5–15)
BASOPHILS # BLD AUTO: 0 10*3/MM3 (ref 0–0.2)
BASOPHILS NFR BLD AUTO: 0.5 % (ref 0–1.5)
BH CV ECHO MEAS - ACS: 2.1 CM
BH CV ECHO MEAS - AO MAX PG: 16.5 MMHG
BH CV ECHO MEAS - AO MEAN PG: 8 MMHG
BH CV ECHO MEAS - AO V2 MAX: 203 CM/SEC
BH CV ECHO MEAS - AO V2 VTI: 40.7 CM
BH CV ECHO MEAS - AVA(I,D): 2.6 CM2
BH CV ECHO MEAS - EDV(CUBED): 117.6 ML
BH CV ECHO MEAS - EDV(MOD-SP4): 68.9 ML
BH CV ECHO MEAS - EF(MOD-SP4): 72.4 %
BH CV ECHO MEAS - ESV(CUBED): 50.7 ML
BH CV ECHO MEAS - ESV(MOD-SP4): 19 ML
BH CV ECHO MEAS - FS: 24.5 %
BH CV ECHO MEAS - IVS/LVPW: 1 CM
BH CV ECHO MEAS - IVSD: 1 CM
BH CV ECHO MEAS - LA DIMENSION: 4.1 CM
BH CV ECHO MEAS - LAT PEAK E' VEL: 13.4 CM/SEC
BH CV ECHO MEAS - LV MASS(C)D: 176 GRAMS
BH CV ECHO MEAS - LV MAX PG: 8.3 MMHG
BH CV ECHO MEAS - LV MEAN PG: 4 MMHG
BH CV ECHO MEAS - LV V1 MAX: 144 CM/SEC
BH CV ECHO MEAS - LV V1 VTI: 28.2 CM
BH CV ECHO MEAS - LVIDD: 4.9 CM
BH CV ECHO MEAS - LVIDS: 3.7 CM
BH CV ECHO MEAS - LVOT AREA: 3.8 CM2
BH CV ECHO MEAS - LVOT DIAM: 2.2 CM
BH CV ECHO MEAS - LVPWD: 1 CM
BH CV ECHO MEAS - MED PEAK E' VEL: 8.6 CM/SEC
BH CV ECHO MEAS - MV A MAX VEL: 91.2 CM/SEC
BH CV ECHO MEAS - MV DEC SLOPE: 313 CM/SEC2
BH CV ECHO MEAS - MV DEC TIME: 0.22 SEC
BH CV ECHO MEAS - MV E MAX VEL: 106 CM/SEC
BH CV ECHO MEAS - MV E/A: 1.16
BH CV ECHO MEAS - MV MAX PG: 3.4 MMHG
BH CV ECHO MEAS - MV MEAN PG: 2 MMHG
BH CV ECHO MEAS - MV P1/2T: 94.5 MSEC
BH CV ECHO MEAS - MV V2 VTI: 27.1 CM
BH CV ECHO MEAS - MVA(P1/2T): 2.33 CM2
BH CV ECHO MEAS - MVA(VTI): 4 CM2
BH CV ECHO MEAS - PA V2 MAX: 90.1 CM/SEC
BH CV ECHO MEAS - RVDD: 3.2 CM
BH CV ECHO MEAS - RVOT DIAM: 2.5 CM
BH CV ECHO MEAS - SV(LVOT): 107.2 ML
BH CV ECHO MEAS - SV(MOD-SP4): 49.9 ML
BH CV ECHO MEAS - TAPSE (>1.6): 3.4 CM
BH CV ECHO MEASUREMENTS AVERAGE E/E' RATIO: 9.64
BH CV XLRA - TDI S': 20.7 CM/SEC
BUN SERPL-MCNC: 21 MG/DL (ref 8–23)
BUN/CREAT SERPL: 23.6 (ref 7–25)
CALCIUM SPEC-SCNC: 9.7 MG/DL (ref 8.6–10.5)
CHLORIDE SERPL-SCNC: 99 MMOL/L (ref 98–107)
CO2 SERPL-SCNC: 33 MMOL/L (ref 22–29)
CREAT SERPL-MCNC: 0.89 MG/DL (ref 0.57–1)
DEPRECATED RDW RBC AUTO: 48.6 FL (ref 37–54)
EGFRCR SERPLBLD CKD-EPI 2021: 70.3 ML/MIN/1.73
EOSINOPHIL # BLD AUTO: 0.2 10*3/MM3 (ref 0–0.4)
EOSINOPHIL NFR BLD AUTO: 2.3 % (ref 0.3–6.2)
ERYTHROCYTE [DISTWIDTH] IN BLOOD BY AUTOMATED COUNT: 14.9 % (ref 12.3–15.4)
GLUCOSE SERPL-MCNC: 91 MG/DL (ref 65–99)
HCT VFR BLD AUTO: 42.4 % (ref 34–46.6)
HGB BLD-MCNC: 14.3 G/DL (ref 12–15.9)
LYMPHOCYTES # BLD AUTO: 1.1 10*3/MM3 (ref 0.7–3.1)
LYMPHOCYTES NFR BLD AUTO: 13.5 % (ref 19.6–45.3)
MCH RBC QN AUTO: 29.7 PG (ref 26.6–33)
MCHC RBC AUTO-ENTMCNC: 33.8 G/DL (ref 31.5–35.7)
MCV RBC AUTO: 88.1 FL (ref 79–97)
MONOCYTES # BLD AUTO: 0.7 10*3/MM3 (ref 0.1–0.9)
MONOCYTES NFR BLD AUTO: 8 % (ref 5–12)
NEUTROPHILS NFR BLD AUTO: 6.3 10*3/MM3 (ref 1.7–7)
NEUTROPHILS NFR BLD AUTO: 75.7 % (ref 42.7–76)
NRBC BLD AUTO-RTO: 0.1 /100 WBC (ref 0–0.2)
PLATELET # BLD AUTO: 240 10*3/MM3 (ref 140–450)
PMV BLD AUTO: 9 FL (ref 6–12)
POTASSIUM SERPL-SCNC: 3.5 MMOL/L (ref 3.5–5.2)
QT INTERVAL: 526 MS
QTC INTERVAL: 496 MS
RBC # BLD AUTO: 4.81 10*6/MM3 (ref 3.77–5.28)
SINUS: 3.7 CM
SODIUM SERPL-SCNC: 142 MMOL/L (ref 136–145)
WBC NRBC COR # BLD AUTO: 8.3 10*3/MM3 (ref 3.4–10.8)

## 2023-11-21 PROCEDURE — 93306 TTE W/DOPPLER COMPLETE: CPT

## 2023-11-21 PROCEDURE — 94640 AIRWAY INHALATION TREATMENT: CPT

## 2023-11-21 PROCEDURE — 85025 COMPLETE CBC W/AUTO DIFF WBC: CPT | Performed by: NURSE PRACTITIONER

## 2023-11-21 PROCEDURE — 80048 BASIC METABOLIC PNL TOTAL CA: CPT | Performed by: NURSE PRACTITIONER

## 2023-11-21 PROCEDURE — 93306 TTE W/DOPPLER COMPLETE: CPT | Performed by: INTERNAL MEDICINE

## 2023-11-21 PROCEDURE — 25010000002 CEFTRIAXONE PER 250 MG: Performed by: NURSE PRACTITIONER

## 2023-11-21 PROCEDURE — 25810000003 SODIUM CHLORIDE 0.9 % SOLUTION: Performed by: NURSE PRACTITIONER

## 2023-11-21 PROCEDURE — 94799 UNLISTED PULMONARY SVC/PX: CPT

## 2023-11-21 RX ORDER — CITALOPRAM 20 MG/1
40 TABLET ORAL DAILY
Status: DISCONTINUED | OUTPATIENT
Start: 2023-11-21 | End: 2023-11-23 | Stop reason: HOSPADM

## 2023-11-21 RX ORDER — IPRATROPIUM BROMIDE AND ALBUTEROL SULFATE 2.5; .5 MG/3ML; MG/3ML
3 SOLUTION RESPIRATORY (INHALATION)
Status: DISCONTINUED | OUTPATIENT
Start: 2023-11-21 | End: 2023-11-23 | Stop reason: HOSPADM

## 2023-11-21 RX ORDER — ANASTROZOLE 1 MG/1
1 TABLET ORAL DAILY
Status: DISCONTINUED | OUTPATIENT
Start: 2023-11-21 | End: 2023-11-23 | Stop reason: HOSPADM

## 2023-11-21 RX ORDER — LISINOPRIL 5 MG/1
10 TABLET ORAL DAILY
Status: DISCONTINUED | OUTPATIENT
Start: 2023-11-21 | End: 2023-11-23 | Stop reason: HOSPADM

## 2023-11-21 RX ORDER — ACETAMINOPHEN 325 MG/1
325 TABLET ORAL EVERY 4 HOURS PRN
Status: DISCONTINUED | OUTPATIENT
Start: 2023-11-21 | End: 2023-11-21 | Stop reason: SDUPTHER

## 2023-11-21 RX ORDER — BUDESONIDE 0.5 MG/2ML
0.5 INHALANT ORAL
Status: DISCONTINUED | OUTPATIENT
Start: 2023-11-21 | End: 2023-11-23 | Stop reason: HOSPADM

## 2023-11-21 RX ORDER — ZOLPIDEM TARTRATE 5 MG/1
5 TABLET ORAL NIGHTLY PRN
Status: DISCONTINUED | OUTPATIENT
Start: 2023-11-21 | End: 2023-11-23 | Stop reason: HOSPADM

## 2023-11-21 RX ADMIN — ZOLPIDEM TARTRATE 5 MG: 5 TABLET ORAL at 20:42

## 2023-11-21 RX ADMIN — SODIUM CHLORIDE 50 ML/HR: 9 INJECTION, SOLUTION INTRAVENOUS at 05:44

## 2023-11-21 RX ADMIN — ANASTROZOLE 1 MG: 1 TABLET ORAL at 10:46

## 2023-11-21 RX ADMIN — CEFTRIAXONE 2000 MG: 2 INJECTION, POWDER, FOR SOLUTION INTRAMUSCULAR; INTRAVENOUS at 16:52

## 2023-11-21 RX ADMIN — CITALOPRAM HYDROBROMIDE 40 MG: 20 TABLET ORAL at 08:25

## 2023-11-21 RX ADMIN — DOXYCYCLINE 100 MG: 100 INJECTION, POWDER, LYOPHILIZED, FOR SOLUTION INTRAVENOUS at 17:32

## 2023-11-21 RX ADMIN — SODIUM CHLORIDE 50 ML/HR: 9 INJECTION, SOLUTION INTRAVENOUS at 08:29

## 2023-11-21 RX ADMIN — DOXYCYCLINE 100 MG: 100 INJECTION, POWDER, LYOPHILIZED, FOR SOLUTION INTRAVENOUS at 05:45

## 2023-11-21 RX ADMIN — IPRATROPIUM BROMIDE AND ALBUTEROL SULFATE 3 ML: .5; 3 SOLUTION RESPIRATORY (INHALATION) at 15:10

## 2023-11-21 RX ADMIN — LISINOPRIL 10 MG: 5 TABLET ORAL at 08:25

## 2023-11-21 NOTE — H&P
"    [facility] Medicine Services      Patient Name: Mary Ann Michaels  : 1955  MRN: [id number]  Primary Care Physician:  Tasia Siu APRN  Date of admission: 2023      Subjective      Chief Complaint: Shortness of breath cough    History of Present Illness: Mary Ann Michaels is a 68 y.o. female with a past medical history of anxiety depression, female stress incontinence, GERD, history of breast cancer on radiation therapy, hypertension, insomnia, who presented to [facility] on 2023 complaining of increased shortness of breath and productive cough.  She reports she has had the problem for about a month and initially went to urgent care and was given an inhaler and some steroids.  She subsequently went to her PCP and she took 7 days worth of antibiotics without relief.  She denies any chest pain.  She reports some fever.  She reports her cough was initially productive of green sputum and is now yellow.  No bilateral lower extremity edema.  Review of records shows she has been fighting an upper respiratory infection/bronchitis with multiple visits to primary care providers in urgent care since 2023 last visit 2023.  Diagnostics in ED today showed: High-sensitivity troponin 17, proBNP 2805, BUN 27, D-dimer 1.60 WBC 8.9, respiratory panel negative, EKG shows sinus bradycardia heart rate 53 right bundle branch block    Chest x-ray per radiology:    \"1. Cardiomegaly with evidence of mild pulmonary vascular congestion and interstitial edema.  2. Increased density in the left hemithorax some of which represents airspace disease and some overlying soft tissue density. Recommend a PA and lateral chest radiograph.  \"    CTA chest per radiology:    \"Impression:  No definite evidence of pulmonary embolism.   Multifocal patchy opacities with areas of consolidation in the lingula and right middle lobe are highly concerning for multifocal/atypical pneumonia.  \"    She " denies any cardiovascular disease or known congestive heart failure.  She was started on IV ceftriaxone and IV doxycycline in ED with blood and sputum cultures pending.  One-time dose 40 mg IV Lasix was ordered.  2D echo ordered and pulmonary has been consulted.  May need cardiology consult pending echo results.     Review of Systems   Constitutional: Positive for fever and malaise/fatigue.   HENT: Negative.     Eyes: Negative.    Cardiovascular:  Positive for dyspnea on exertion.   Respiratory:  Positive for cough, shortness of breath and wheezing.    Endocrine: Negative.    Hematologic/Lymphatic: Negative.    Skin: Negative.    Musculoskeletal: Negative.    Gastrointestinal: Negative.    Genitourinary: Negative.    Neurological: Negative.    Psychiatric/Behavioral: Negative.     Allergic/Immunologic: Negative.    All other systems reviewed and are negative.       Personal History     Past Medical History:   Diagnosis Date    Anxiety and depression     Female stress incontinence     GERD (gastroesophageal reflux disease)     Hiatal hernia     History of left breast cancer 12/2021    History of radiation therapy 06/2022    25 TREATMENTS    Hypertension     Insomnia     Sleep apnea     CPAP       Past Surgical History:   Procedure Laterality Date    BILATERAL BREAST REDUCTION Right 2/2/2023    Procedure: RIGHT REDUCTION FOR SYMMETRY;  Surgeon: Raj Montgomery MD;  Location: [facility];  Service: Plastics;  Laterality: Right;    BREAST RECONSTRUCTION Left 03/30/2022    Procedure: LEFT PLACEMENT TISSUE EXPANDER AND ALLODERM;  Surgeon: Raj Montgomery MD;  Location: [facility] OR;  Service: Plastics;  Laterality: Left;    BREAST TISSUE EXPANDER REMOVAL INSERTION OF IMPLANT Left 2/2/2023    Procedure: LEFT REMOVAL JTISSUE EXPANDER AND PLACEMENT OF IMPLANT;  Surgeon: Raj Montgomery MD;  Location: [facility];  Service: Plastics;  Laterality: Left;    CATARACT EXTRACTION W/ INTRAOCULAR LENS  IMPLANT Bilateral     1-3-2023    1-     SECTION          CHOLECYSTECTOMY      COLONOSCOPY      ENDOSCOPY      FAT GRAFTING Left 2023    Procedure: FAT GRAFTING;  Surgeon: Raj Montgomery MD;  Location: [facility];  Service: Plastics;  Laterality: Left;    HYSTERECTOMY      KNEE SURGERY Right     ARTHROSCOPY TENDON REPAIR    MASTECTOMY W/ SENTINEL NODE BIOPSY Left 2022    Procedure: left total mastectomy with left axillary sentinel lymph node biopsy;  Surgeon: Catherine Isaac MD;  Location: [facility];  Service: General;  Laterality: Left;       Family History: family history includes Liver cancer in her brother and brother; Lung cancer in her father; Ovarian cancer (age of onset: 67) in her mother; Pancreatic cancer in her maternal grandmother. Otherwise pertinent FHx was reviewed and not pertinent to current issue.    Social History:  reports that she has never smoked. She has never been exposed to tobacco smoke. She has never used smokeless tobacco. She reports that she does not drink alcohol and does not use drugs.    Home Medications:  Prior to Admission Medications       Prescriptions Last Dose Informant Patient Reported? Taking?    acetaminophen (TYLENOL) 325 MG tablet   No No    Take 1 tablet by mouth Every 4 (Four) Hours As Needed for Mild Pain.    anastrozole (ARIMIDEX) 1 MG tablet   No No    TAKE ONE TABLET BY MOUTH DAILY    citalopram (CeleXA) 40 MG tablet   Yes No    Take 1 tablet by mouth Daily.    lisinopril (PRINIVIL,ZESTRIL) 10 MG tablet   Yes No    Take 1 tablet by mouth Daily.    propranolol LA (INDERAL LA) 160 MG 24 hr capsule   Yes No    Take 1 capsule by mouth Every Night.    zolpidem (AMBIEN) 5 MG tablet   Yes No    Take 1 tablet by mouth At Night As Needed.              Allergies:  No Known Allergies    Objective      Vitals:   Temp:  [97.5 °F (36.4 °C)] 97.5 °F (36.4 °C)  Heart Rate:  [] 79  Resp:  [15-28] 28  BP:  "(109-157)/(67-80) 131/69  Flow (L/min):  [2] 2    Physical Exam  Vitals reviewed.   Constitutional:       Appearance: Normal appearance. She is obese.   HENT:      Head: Normocephalic and atraumatic.      Right Ear: External ear normal.      Left Ear: External ear normal.      Nose: Nose normal.      Mouth/Throat:      Mouth: Mucous membranes are moist.   Eyes:      Extraocular Movements: Extraocular movements intact.   Cardiovascular:      Rate and Rhythm: Normal rate and regular rhythm.      Pulses: Normal pulses.      Heart sounds: Normal heart sounds.   Pulmonary:      Effort: Pulmonary effort is normal.      Breath sounds: Wheezing present.   Abdominal:      Palpations: Abdomen is soft.   Genitourinary:     Comments: deferred  Musculoskeletal:         General: Normal range of motion.      Cervical back: Normal range of motion and neck supple.   Skin:     General: Skin is warm and dry.   Neurological:      General: No focal deficit present.      Mental Status: She is alert and oriented to person, place, and time.   Psychiatric:         Mood and Affect: Mood normal.         Behavior: Behavior normal.         Thought Content: Thought content normal.         Judgment: Judgment normal.          Result Review    Result Review:  I have personally reviewed the results from the time of this admission to 11/21/2023 05:56 EST and agree with these findings:  [x]  Laboratory  [x]  Microbiology  [x]  Radiology  [x]  EKG/Telemetry   [x]  Cardiology/Vascular   []  Pathology  [x]  Old records  []  Other:  Most notable findings include: High-sensitivity troponin 17, proBNP 2805, BUN 27, D-dimer 1.60 WBC 8.9, respiratory panel negative, EKG shows sinus bradycardia heart rate 53 right bundle branch block    Chest x-ray per radiology:    \"1. Cardiomegaly with evidence of mild pulmonary vascular congestion and interstitial edema.  2. Increased density in the left hemithorax some of which represents airspace disease and some " "overlying soft tissue density. Recommend a PA and lateral chest radiograph.  \"    CTA chest per radiology:    \"Impression:  No definite evidence of pulmonary embolism.   Multifocal patchy opacities with areas of consolidation in the lingula and right middle lobe are highly concerning for multifocal/atypical pneumonia.  \"     Assessment & Plan        Active Hospital Problems:  Active Hospital Problems    Diagnosis     **Fever     Dyspnea     Elevated troponin     Elevated brain natriuretic peptide (BNP) level     Pneumonia     Anxiety disorder     Essential hypertension     Morbid obesity     Breast cancer of upper-outer quadrant of left female breast      Plan:    Dyspnea, fever afebrile here possibly secondary to multifocal atypical pneumonia, respiratory panel negative WBC not elevated, continue IV ceftriaxone and IV doxycycline until blood cultures resulted, stable on 2 to 3 L via nasal cannula, pulmonary consulted given failed outpatient treatment for approximately a month, may also be secondary to undiagnosed acute systolic heart failure see plan below    Elevated troponin borderline 17, likely secondary to ischemic demand denies chest pain continuous cardiac monitoring    Elevated proBNP, with cardiomegaly on chest x-ray may be undiagnosed CHF, 2D echo in a.m., one-time dose 40 mg IV Lasix consider cardiology consult    Anxiety disorder, on home citalopram reordered    Essential hypertension, on home lisinopril reordered monitor BP    Morbid obesity BMI 40.6 lifestyle management education    Breast cancer, followed by oncology status post radiation treatment, on anastrozole              DVT prophylaxis:  Mechanical DVT prophylaxis orders are present.    CODE STATUS:    Code Status (Patient has no pulse and is not breathing): CPR (Attempt to Resuscitate)  Medical Interventions (Patient has pulse or is breathing): Full Support    Admission Status:  I believe this patient meets inpatient  status.    I discussed " the patient's findings and my recommendations with patient.    This patient has been examined wearing appropriate Personal Protective Equipment     . 11/21/23      Signature: Electronically signed by SAMANTA Bland, 11/21/23, 5:59 AM EST.

## 2023-11-21 NOTE — CONSULTS
PULMONARY CRITICAL CARE CONSULT NOTE      PATIENT IDENTIFICATION:  Name: Mary Ann Michaels  MRN: [id number]  :  1955     Age: 68 y.o.  Sex: female        DATE OF CONSULTATION:  2023  PRIMARY CARE PHYSICIAN    Tasia Siu APRN                  CHIEF COMPLAINT: SOB    History of Present Illness:   Mary Ann Michaels is a 68 y.o. 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  She reports she has had the problem for about a month but has gotten increasingly worse.  States she did go to urgent care was given an inhaler and some steroids.  She also states she went to her PCP and took 7 days worth of antibiotics without relief.  ER noted with pneumonia and admitted for further treatment.      Review of Systems:   Constitutional: As above   Eyes: negative   ENT/oropharynx: negative   Cardiovascular: negative   Respiratory: As above   Gastrointestinal: negative   Genitourinary: negative   Neurological: negative   Musculoskeletal: negative   Integument/breast: As above   Endocrine: negative   Allergic/Immunologic: negative     Past Medical History:  Past Medical History:   Diagnosis Date    Anxiety and depression     Female stress incontinence     GERD (gastroesophageal reflux disease)     Hiatal hernia     History of left breast cancer 2021    History of radiation therapy 2022    25 TREATMENTS    Hypertension     Insomnia     Sleep apnea     CPAP       Past Surgical History:  Past Surgical History:   Procedure Laterality Date    BILATERAL BREAST REDUCTION Right 2023    Procedure: RIGHT REDUCTION FOR SYMMETRY;  Surgeon: Raj Montgomery MD;  Location: [facility];  Service: Plastics;  Laterality: Right;    BREAST RECONSTRUCTION Left 2022    Procedure: LEFT PLACEMENT TISSUE EXPANDER AND ALLODERM;  Surgeon:  Raj Montgomery MD;  [facility];  Service: Plastics;  Laterality: Left;    BREAST TISSUE EXPANDER REMOVAL INSERTION OF IMPLANT Left 2023    Procedure: LEFT REMOVAL JTISSUE EXPANDER AND PLACEMENT OF IMPLANT;  Surgeon: Raj Montgomery MD;  Location:  [facility];  Service: Plastics;  Laterality: Left;    CATARACT EXTRACTION W/ INTRAOCULAR LENS IMPLANT Bilateral     1-3-2023    1-     SECTION          CHOLECYSTECTOMY      COLONOSCOPY      ENDOSCOPY      FAT GRAFTING Left 2023    Procedure: FAT GRAFTING;  Surgeon: Raj Montgomery MD;  [facility];  Service: Plastics;  Laterality: Left;    HYSTERECTOMY      KNEE SURGERY Right     ARTHROSCOPY TENDON REPAIR    MASTECTOMY W/ SENTINEL NODE BIOPSY Left 2022    Procedure: left total mastectomy with left axillary sentinel lymph node biopsy;  Surgeon: Catherine Isaac MD;  Location: [facility] MAIN OR;  Service: General;  Laterality: Left;        Family History:  Family History   Problem Relation Age of Onset    Ovarian cancer Mother 67    Lung cancer Father     Liver cancer Brother     Liver cancer Brother     Pancreatic cancer Maternal Grandmother     Malig Hyperthermia Neg Hx         Social History:   Social History     Tobacco Use    Smoking status: Never     Passive exposure: Never    Smokeless tobacco: Never   Substance Use Topics    Alcohol use: Never        Allergies:  No Known Allergies    Home Meds:  (Not in a hospital admission)      Objective:  tMax 24 hrs: Temp (24hrs), Av °F (36.7 °C), Min:97.5 °F (36.4 °C), Max:98.2 °F (36.8 °C)      Vitals Ranges:   Temp:  [97.5 °F (36.4 °C)-98.2 °F (36.8 °C)] 98.2 °F (36.8 °C)  Heart Rate:  [] 60  Resp:  [14-28] 17  BP: (109-157)/(62-80) 134/62    Intake and Output Last 3 Shifts:   I/O last 3 completed shifts:  In: 1540 [P.O.:240; I.V.:1000; IV Piggyback:300]  Out: 800 [Urine:800]    Exam:  /62 (BP Location: Right arm,  "Patient Position: Lying)   Pulse 60   Temp 98.2 °F (36.8 °C) (Oral)   Resp 17   Ht 165.1 cm (65\")   Wt 111 kg (244 lb)   LMP  (LMP Unknown)   SpO2 90%   BMI 40.60 kg/m²       11/20/23  1317   Weight: 111 kg (244 lb)     General Appearance:  AAO    HEENT:  Normocephalic, without obvious abnormality, atraumatic. Conjunctivae/corneas clear.  Normal external ear canals. Nares normal, no drainage.  Neck:  Supple, symmetrical, trachea midline. No JVD.  Lungs /Chest wall: some wheezing, hoarse voice, rhonchi, respirations unlabored, symmetrical wall movement.     Heart:  Regular rate and rhythm, systolic murmur PMI left sternal border  Abdomen: Soft, nontender, no masses, no organomegaly.    Extremities: Trace edema, no clubbing or cyanosis        Data Review:  All labs (24hrs):   Recent Results (from the past 24 hour(s))   ECG 12 Lead Dyspnea    Collection Time: 11/20/23  1:25 PM   Result Value Ref Range    QT Interval 526 ms    QTC Interval 496 ms   Comprehensive Metabolic Panel    Collection Time: 11/20/23  3:17 PM    Specimen: Blood   Result Value Ref Range    Glucose 99 65 - 99 mg/dL    BUN 27 (H) 8 - 23 mg/dL    Creatinine 0.77 0.57 - 1.00 mg/dL    Sodium 144 136 - 145 mmol/L    Potassium 3.7 3.5 - 5.2 mmol/L    Chloride 103 98 - 107 mmol/L    CO2 31.0 (H) 22.0 - 29.0 mmol/L    Calcium 9.8 8.6 - 10.5 mg/dL    Total Protein 6.8 6.0 - 8.5 g/dL    Albumin 3.4 (L) 3.5 - 5.2 g/dL    ALT (SGPT) 21 1 - 33 U/L    AST (SGOT) 23 1 - 32 U/L    Alkaline Phosphatase 97 39 - 117 U/L    Total Bilirubin 0.3 0.0 - 1.2 mg/dL    Globulin 3.4 gm/dL    A/G Ratio 1.0 g/dL    BUN/Creatinine Ratio 35.1 (H) 7.0 - 25.0    Anion Gap 10.0 5.0 - 15.0 mmol/L    eGFR 83.6 >60.0 mL/min/1.73   BNP    Collection Time: 11/20/23  3:17 PM    Specimen: Blood   Result Value Ref Range    proBNP 2,805.0 (H) 0.0 - 900.0 pg/mL   Single High Sensitivity Troponin T    Collection Time: 11/20/23  3:17 PM    Specimen: Blood   Result Value Ref Range    HS " Troponin T 17 (H) <14 ng/L   D-dimer, Quantitative    Collection Time: 11/20/23  3:17 PM    Specimen: Blood   Result Value Ref Range    D-Dimer, Quantitative 1.60 (H) 0.00 - 0.69 mg/L (FEU)   Green Top (Gel)    Collection Time: 11/20/23  3:17 PM   Result Value Ref Range    Extra Tube     Lavender Top    Collection Time: 11/20/23  3:17 PM   Result Value Ref Range    Extra Tube hold for add-on    Gold Top - SST    Collection Time: 11/20/23  3:17 PM   Result Value Ref Range    Extra Tube Hold for add-ons.    Light Blue Top    Collection Time: 11/20/23  3:17 PM   Result Value Ref Range    Extra Tube Hold for add-ons.    CBC Auto Differential    Collection Time: 11/20/23  3:17 PM    Specimen: Blood   Result Value Ref Range    WBC 8.90 3.40 - 10.80 10*3/mm3    RBC 4.50 3.77 - 5.28 10*6/mm3    Hemoglobin 13.8 12.0 - 15.9 g/dL    Hematocrit 39.9 34.0 - 46.6 %    MCV 88.7 79.0 - 97.0 fL    MCH 30.6 26.6 - 33.0 pg    MCHC 34.5 31.5 - 35.7 g/dL    RDW 14.3 12.3 - 15.4 %    RDW-SD 44.6 37.0 - 54.0 fl    MPV 8.7 6.0 - 12.0 fL    Platelets 247 140 - 450 10*3/mm3   Scan Slide    Collection Time: 11/20/23  3:17 PM    Specimen: Blood   Result Value Ref Range    Scan Slide     Manual Differential    Collection Time: 11/20/23  3:17 PM    Specimen: Blood   Result Value Ref Range    Neutrophil % 78.0 (H) 42.7 - 76.0 %    Lymphocyte % 12.0 (L) 19.6 - 45.3 %    Monocyte % 8.0 5.0 - 12.0 %    Eosinophil % 1.0 0.3 - 6.2 %    Basophil % 1.0 0.0 - 1.5 %    Neutrophils Absolute 6.94 1.70 - 7.00 10*3/mm3    Lymphocytes Absolute 1.07 0.70 - 3.10 10*3/mm3    Monocytes Absolute 0.71 0.10 - 0.90 10*3/mm3    Eosinophils Absolute 0.09 0.00 - 0.40 10*3/mm3    Basophils Absolute 0.09 0.00 - 0.20 10*3/mm3    Anisocytosis Slight/1+ None Seen    Poikilocytes Slight/1+ None Seen    WBC Morphology Normal Normal    Platelet Morphology Normal Normal   Respiratory Panel PCR w/COVID-19(SARS-CoV-2) [facility] In-House, NP Swab in UTM/VTM, 2 HR TAT -  Swab, Nasopharynx    Collection Time: 11/20/23  3:18 PM    Specimen: Nasopharynx; Swab   Result Value Ref Range    ADENOVIRUS, PCR Not Detected Not Detected    Coronavirus 229E Not Detected Not Detected    Coronavirus HKU1 Not Detected Not Detected    Coronavirus NL63 Not Detected Not Detected    Coronavirus OC43 Not Detected Not Detected    COVID19 Not Detected Not Detected - Ref. Range    Human Metapneumovirus Not Detected Not Detected    Human Rhinovirus/Enterovirus Not Detected Not Detected    Influenza A PCR Not Detected Not Detected    Influenza B PCR Not Detected Not Detected    Parainfluenza Virus 1 Not Detected Not Detected    Parainfluenza Virus 2 Not Detected Not Detected    Parainfluenza Virus 3 Not Detected Not Detected    Parainfluenza Virus 4 Not Detected Not Detected    RSV, PCR Not Detected Not Detected    Bordetella pertussis pcr Not Detected Not Detected    Bordetella parapertussis PCR Not Detected Not Detected    Chlamydophila pneumoniae PCR Not Detected Not Detected    Mycoplasma pneumo by PCR Not Detected Not Detected   Legionella Antigen, Urine - Urine, Urine, Clean Catch    Collection Time: 11/20/23  8:47 PM    Specimen: Urine, Clean Catch   Result Value Ref Range    LEGIONELLA ANTIGEN, URINE Negative Negative   S. Pneumo Ag Urine or CSF - Urine, Urine, Clean Catch    Collection Time: 11/20/23  8:47 PM    Specimen: Urine, Clean Catch   Result Value Ref Range    Strep Pneumo Ag Negative Negative   Procalcitonin    Collection Time: 11/20/23  9:02 PM    Specimen: Blood   Result Value Ref Range    Procalcitonin 0.07 0.00 - 0.25 ng/mL   MRSA Screen, PCR (Inpatient) - Swab, Nares    Collection Time: 11/20/23  9:32 PM    Specimen: Nares; Swab   Result Value Ref Range    MRSA PCR No MRSA Detected No MRSA Detected   Basic Metabolic Panel    Collection Time: 11/21/23  3:52 AM    Specimen: Blood   Result Value Ref Range    Glucose 91 65 - 99 mg/dL    BUN 21 8 - 23 mg/dL    Creatinine 0.89 0.57 - 1.00  mg/dL    Sodium 142 136 - 145 mmol/L    Potassium 3.5 3.5 - 5.2 mmol/L    Chloride 99 98 - 107 mmol/L    CO2 33.0 (H) 22.0 - 29.0 mmol/L    Calcium 9.7 8.6 - 10.5 mg/dL    BUN/Creatinine Ratio 23.6 7.0 - 25.0    Anion Gap 10.0 5.0 - 15.0 mmol/L    eGFR 70.3 >60.0 mL/min/1.73   CBC Auto Differential    Collection Time: 11/21/23  3:52 AM    Specimen: Blood   Result Value Ref Range    WBC 8.30 3.40 - 10.80 10*3/mm3    RBC 4.81 3.77 - 5.28 10*6/mm3    Hemoglobin 14.3 12.0 - 15.9 g/dL    Hematocrit 42.4 34.0 - 46.6 %    MCV 88.1 79.0 - 97.0 fL    MCH 29.7 26.6 - 33.0 pg    MCHC 33.8 31.5 - 35.7 g/dL    RDW 14.9 12.3 - 15.4 %    RDW-SD 48.6 37.0 - 54.0 fl    MPV 9.0 6.0 - 12.0 fL    Platelets 240 140 - 450 10*3/mm3    Neutrophil % 75.7 42.7 - 76.0 %    Lymphocyte % 13.5 (L) 19.6 - 45.3 %    Monocyte % 8.0 5.0 - 12.0 %    Eosinophil % 2.3 0.3 - 6.2 %    Basophil % 0.5 0.0 - 1.5 %    Neutrophils, Absolute 6.30 1.70 - 7.00 10*3/mm3    Lymphocytes, Absolute 1.10 0.70 - 3.10 10*3/mm3    Monocytes, Absolute 0.70 0.10 - 0.90 10*3/mm3    Eosinophils, Absolute 0.20 0.00 - 0.40 10*3/mm3    Basophils, Absolute 0.00 0.00 - 0.20 10*3/mm3    nRBC 0.1 0.0 - 0.2 /100 WBC        Imaging:  CT Angiogram Chest Pulmonary Embolism    Result Date: 11/20/2023  CT ANGIOGRAM CHEST PULMONARY EMBOLISM Date of Exam: 11/20/2023 4:27 PM EST Indication: Pulmonary embolism (PE) suspected, positive D-dimer. Comparison: 12/19/2018 Technique: Axial CT images were obtained of the chest after the uneventful intravenous administration of iodinated contrast utilizing pulmonary embolism protocol.  Sagittal and coronal reconstructions were performed.  Automated exposure control and iterative reconstruction methods were used. Findings: Pulmonary arteries:Adequate opacification of the pulmonary arteries. No evidence of acute pulmonary embolism. Aorta: Unremarkable. Lungs and Pleura: Extensive patchy opacities and tree-in-bud nodularities throughout the lungs. This  is more consolidative in the lingula and right middle lobe. The central airways are patent Mediastinum/Faith: No lymphadenopathy. No suspicious mass. Heart: Mild cardiomegaly. No pericardial effusion normal RV/LV ratio. Soft Tissue: Findings consistent with prior mastectomy and left breast implantation. Otherwise unremarkable. Upper Abdomen: Small hiatal hernia. Otherwise unremarkable Bones: No acute osseous abnormality.     Impression: No definite evidence of pulmonary embolism. Multifocal patchy opacities with areas of consolidation in the lingula and right middle lobe are highly concerning for multifocal/atypical pneumonia. Electronically Signed: Vazquez Solano DO  11/20/2023 4:49 PM EST  Workstation ID: [id number]    XR Chest 1 View    Result Date: 11/20/2023  XR CHEST 1 VW Date of Exam: 11/20/2023 3:49 PM EST Indication: CHF/COPD Protocol CHF/COPD Protocol Comparison: 3/23/2022 Findings: Heart size appears enlarged. There is pulmonary vascular congestion and at least mild interstitial edema. There is increased density overlying the left hemithorax. Some of this is related to overlying soft tissues. I can't completely exclude underlying airspace disease in the left lung.     Impression: 1. Cardiomegaly with evidence of mild pulmonary vascular congestion and interstitial edema. 2. Increased density in the left hemithorax some of which represents airspace disease and some overlying soft tissue density. Recommend a PA and lateral chest radiograph. Electronically Signed: Ron Burnette MD  11/20/2023 3:52 PM EST  Workstation ID: [id number]       Current:  anastrozole, 1 mg, Oral, Daily  cefTRIAXone, 2,000 mg, Intravenous, Q24H  citalopram, 40 mg, Oral, Daily  doxycycline, 100 mg, Intravenous, Q12H  lisinopril, 10 mg, Oral, Daily        Infusion:  sodium chloride, 50 mL/hr, Last Rate: 50 mL/hr (11/21/23 0829)         PRN:    acetaminophen    ipratropium-albuterol    sodium chloride     zolpidem    ASSESSMENT:  Pneumonia  JAMES  COPD 2/2 2nd hand smoking  Fever  Hx left breast cancer  Elevated troponin  HTN  Morbid obesity   Anxiety    PLAN:  Antibiotics  Bronchodilators  Inhaled corticosteroids  Incentive spirometer  IV fluids  May use home CPAP machine   Electrolytes/ glycemic control  DVT and GI prophylaxis    Discussed with Dr Gustabo Crowley, SAMANTA   11/21/2023  12:19 EST      I personally have examined  and interviewed the patient. I have reviewed the history, data, problems, assessment and plan with our NP.  Total Critical care time in direct medical management (   ) minutes, This time specifically excludes time spent performing procedures.    Jostin Montejo MD   11/21/2023  12:31 EST

## 2023-11-21 NOTE — CASE MANAGEMENT/SOCIAL WORK
Discharge Planning Assessment   Vladimir     Patient Name: Mary Ann Michaels  MRN: [id number]  Today's Date: 11/21/2023    Admit Date: 11/20/2023    Plan: Home, Watch for Oxygen needs   Discharge Needs Assessment       Row Name 11/21/23 1229       Living Environment    People in Home spouse    Current Living Arrangements home    Potentially Unsafe Housing Conditions none    Primary Care Provided by self    Provides Primary Care For no one    Quality of Family Relationships supportive    Able to Return to Prior Arrangements yes       Resource/Environmental Concerns    Resource/Environmental Concerns none    Transportation Concerns none       Food Insecurity    Within the past 12 months, you worried that your food would run out before you got the money to buy more. Never true    Within the past 12 months, the food you bought just didn't last and you didn't have money to get more. Never true       Transition Planning    Patient/Family Anticipates Transition to home with family    Patient/Family Anticipated Services at Transition none    Transportation Anticipated family or friend will provide;car, drives self       Discharge Needs Assessment    Readmission Within the Last 30 Days no previous admission in last 30 days    Equipment Currently Used at Home cpap    Anticipated Changes Related to Illness none    Equipment Needed After Discharge none                   Discharge Plan       Row Name 11/21/23 1232       Plan    Plan Home, Watch for Oxygen needs    Plan Comments Met with Patient at bedside Lives at home with . IADL's PCP and Pharmacy verified, able to afford medications. no transportation or finanical concerns. d/c barriers: Wean O2, IV ATB, Pulm FOllowing                  Continued Care and Services - Admitted Since 11/20/2023    Coordination has not been started for this encounter.       Expected Discharge Date and Time       Expected Discharge Date Expected Discharge Time    Nov 23, 2023             Demographic Summary       Row Name 11/21/23 1228       General Information    Admission Type inpatient    Arrived From emergency department    Required Notices Provided Important Message from Medicare    Referral Source admission list    Reason for Consult discharge planning    Preferred Language English                   Functional Status       Row Name 11/21/23 1229       Functional Status    Usual Activity Tolerance good    Current Activity Tolerance good       Functional Status, IADL    Medications independent    Meal Preparation independent    Housekeeping independent    Laundry independent    Shopping independent       Mental Status    General Appearance WDL WDL       Mental Status Summary    Recent Changes in Mental Status/Cognitive Functioning no changes                   Psychosocial    No documentation.                  Abuse/Neglect    No documentation.                  Legal    No documentation.                  Substance Abuse    No documentation.                  Patient Forms       Row Name 11/21/23 1233       Patient Forms    Important Message from Medicare (IMM) --  IMM 11/20 per kale Ryan RN

## 2023-11-22 PROCEDURE — 94799 UNLISTED PULMONARY SVC/PX: CPT

## 2023-11-22 PROCEDURE — 94761 N-INVAS EAR/PLS OXIMETRY MLT: CPT

## 2023-11-22 PROCEDURE — 87070 CULTURE OTHR SPECIMN AEROBIC: CPT | Performed by: NURSE PRACTITIONER

## 2023-11-22 PROCEDURE — 25010000002 ENOXAPARIN PER 10 MG: Performed by: INTERNAL MEDICINE

## 2023-11-22 PROCEDURE — 87205 SMEAR GRAM STAIN: CPT | Performed by: NURSE PRACTITIONER

## 2023-11-22 PROCEDURE — 25010000002 CEFTRIAXONE PER 250 MG: Performed by: INTERNAL MEDICINE

## 2023-11-22 RX ORDER — ENOXAPARIN SODIUM 100 MG/ML
40 INJECTION SUBCUTANEOUS 2 TIMES DAILY
Status: DISCONTINUED | OUTPATIENT
Start: 2023-11-22 | End: 2023-11-23 | Stop reason: HOSPADM

## 2023-11-22 RX ORDER — GUAIFENESIN 600 MG/1
1200 TABLET, EXTENDED RELEASE ORAL EVERY 12 HOURS SCHEDULED
Status: DISCONTINUED | OUTPATIENT
Start: 2023-11-22 | End: 2023-11-23 | Stop reason: HOSPADM

## 2023-11-22 RX ADMIN — CEFTRIAXONE 2000 MG: 2 INJECTION, POWDER, FOR SOLUTION INTRAMUSCULAR; INTRAVENOUS at 16:33

## 2023-11-22 RX ADMIN — ENOXAPARIN SODIUM 40 MG: 100 INJECTION SUBCUTANEOUS at 09:48

## 2023-11-22 RX ADMIN — IPRATROPIUM BROMIDE AND ALBUTEROL SULFATE 3 ML: .5; 3 SOLUTION RESPIRATORY (INHALATION) at 18:38

## 2023-11-22 RX ADMIN — DOXYCYCLINE 100 MG: 100 INJECTION, POWDER, LYOPHILIZED, FOR SOLUTION INTRAVENOUS at 06:04

## 2023-11-22 RX ADMIN — ZOLPIDEM TARTRATE 5 MG: 5 TABLET ORAL at 21:19

## 2023-11-22 RX ADMIN — CITALOPRAM HYDROBROMIDE 40 MG: 20 TABLET ORAL at 08:10

## 2023-11-22 RX ADMIN — GUAIFENESIN 1200 MG: 600 TABLET, MULTILAYER, EXTENDED RELEASE ORAL at 21:19

## 2023-11-22 RX ADMIN — IPRATROPIUM BROMIDE AND ALBUTEROL SULFATE 3 ML: .5; 3 SOLUTION RESPIRATORY (INHALATION) at 15:12

## 2023-11-22 RX ADMIN — IPRATROPIUM BROMIDE AND ALBUTEROL SULFATE 3 ML: .5; 3 SOLUTION RESPIRATORY (INHALATION) at 06:53

## 2023-11-22 RX ADMIN — ANASTROZOLE 1 MG: 1 TABLET ORAL at 08:10

## 2023-11-22 RX ADMIN — BUDESONIDE 0.5 MG: 0.5 SUSPENSION RESPIRATORY (INHALATION) at 18:48

## 2023-11-22 RX ADMIN — ENOXAPARIN SODIUM 40 MG: 100 INJECTION SUBCUTANEOUS at 21:19

## 2023-11-22 RX ADMIN — LISINOPRIL 10 MG: 5 TABLET ORAL at 08:10

## 2023-11-22 RX ADMIN — GUAIFENESIN 1200 MG: 600 TABLET, MULTILAYER, EXTENDED RELEASE ORAL at 09:48

## 2023-11-22 RX ADMIN — BUDESONIDE 0.5 MG: 0.5 SUSPENSION RESPIRATORY (INHALATION) at 06:52

## 2023-11-22 RX ADMIN — IPRATROPIUM BROMIDE AND ALBUTEROL SULFATE 3 ML: .5; 3 SOLUTION RESPIRATORY (INHALATION) at 11:48

## 2023-11-22 NOTE — CASE MANAGEMENT/SOCIAL WORK
Continued Stay Note  INDIGO Gilbert     Patient Name: Mary Ann Michaels  MRN: [id number]  Today's Date: 11/22/2023    Admit Date: 11/20/2023    Plan: Routine home. Watch for new O2.   Discharge Plan       Row Name 11/22/23 1521       Plan    Plan Routine home. Watch for new O2.    Plan Comments DC barriers: pt on IV Rocephin, Echo comleted. Pt now on room air.             Megan Naegele, RN     Office Phone: [phone]  Office Cell: [phone]

## 2023-11-22 NOTE — PROGRESS NOTES
"[facility] MEDICINE SERVICE  DAILY PROGRESS NOTE    NAME: Mary Ann Michaels  : 1955  MRN: [id number]      LOS: 1 day     PROVIDER OF SERVICE: Jaydon Cota MD    Chief Complaint: Fever     SUBJECTIVE     Patient states she feels better.  States her shortness of breath is resolving.    OBJECTIVE   /63   Pulse 91   Temp 98.2 °F (36.8 °C) (Axillary)   Resp 20   Ht 165.1 cm (65\")   Wt 111 kg (244 lb)   LMP  (LMP Unknown)   SpO2 100%   BMI 40.60 kg/m²         Scheduled Meds   anastrozole, 1 mg, Oral, Daily  budesonide, 0.5 mg, Nebulization, BID - RT  cefTRIAXone, 2,000 mg, Intravenous, Q24H  citalopram, 40 mg, Oral, Daily  doxycycline, 100 mg, Intravenous, Q12H  ipratropium-albuterol, 3 mL, Nebulization, 4x Daily - RT  lisinopril, 10 mg, Oral, Daily       PRN Meds     acetaminophen    ipratropium-albuterol    sodium chloride    zolpidem   Infusions  sodium chloride, 50 mL/hr, Last Rate: 50 mL/hr (23 0829)          EXAM:    General: Not in any acute distress  HEENT: Normocephalic, atraumatic  Neck: Supple, No JVD  CV: Regular rate and rhythm, S1 and S2 are normal, no murmurs/rubs/or gallops  Lungs: Clear to auscultation bilaterally, no rales/rhonchi/wheezes  Abdomen: Soft, non-distended, non-tender, bowel sounds present  EXT: No edema of lower extremities  Neuro: Cranial nerves II through XII intact  Skin: Intact, no rashes, no lesions, no erythema    Medications reviewed: yes.  Labs reviewed: yes.    Result Review:  I have personally reviewed the results from the time of this admission to 2023 19:49 EST and agree with these findings:  [x]  Laboratory  []  Microbiology  []  Radiology  []  EKG/Telemetry   []  Cardiology/Vascular   []  Pathology  []  Old records  []  Other:      ASSESSMENT/PLAN       Pneumonia  Continue antibiotics  Continue bronchodilators    COPD exacerbation  Continue bronchodilators  Continue inhaled steroids  Pulmonology consulted and evaluation " appreciated    History of left breast cancer  Chronic and stable    Venous embolism prophylaxis: SCDs  Code: Full code

## 2023-11-22 NOTE — PROGRESS NOTES
"[facility] MEDICINE SERVICE  DAILY PROGRESS NOTE    NAME: Mary Ann Michaels  : 1955  MRN: [id number]      LOS: 2 days     PROVIDER OF SERVICE: Jaydon Cota MD    Chief Complaint: Fever     SUBJECTIVE     Patient states she feels better.  States she still has a cough and short of breath at times.    Currently requiring 2 L per nasal cannula.        OBJECTIVE   /80 (BP Location: Right arm, Patient Position: Sitting)   Pulse 80   Temp 98.5 °F (36.9 °C) (Oral)   Resp 24   Ht 165.1 cm (65\")   Wt 111 kg (244 lb)   LMP  (LMP Unknown)   SpO2 91%   BMI 40.60 kg/m²         Scheduled Meds   anastrozole, 1 mg, Oral, Daily  budesonide, 0.5 mg, Nebulization, BID - RT  cefTRIAXone, 2,000 mg, Intravenous, Q24H  citalopram, 40 mg, Oral, Daily  enoxaparin, 40 mg, Subcutaneous, BID  guaiFENesin, 1,200 mg, Oral, Q12H  ipratropium-albuterol, 3 mL, Nebulization, 4x Daily - RT  lisinopril, 10 mg, Oral, Daily       PRN Meds     acetaminophen    ipratropium-albuterol    sodium chloride    zolpidem   Infusions  sodium chloride, 50 mL/hr, Last Rate: 50 mL/hr (23 0829)          EXAM:    General: Not in any acute distress  HEENT: Normocephalic, atraumatic  Neck: Supple, No JVD  CV: Regular rate and rhythm, S1 and S2 are normal, no murmurs/rubs/or gallops  Lungs: Clear to auscultation bilaterally, no rales/rhonchi/wheezes  Abdomen: Soft, non-distended, non-tender, bowel sounds present  EXT: No edema of lower extremities  Neuro: Cranial nerves II through XII intact  Skin: Intact, no rashes, no lesions, no erythema    Medications reviewed: yes.  Labs reviewed: yes.    Result Review:  I have personally reviewed the results from the time of this admission to 2023 18:57 EST and agree with these findings:  [x]  Laboratory  []  Microbiology  []  Radiology  []  EKG/Telemetry   []  Cardiology/Vascular   []  Pathology  []  Old records  []  Other:      ASSESSMENT/PLAN       Pneumonia  Echo 2023: EF 61-65% " with normal RV systolic pressure  CT scan of the chest 11/20/2023 negative for PE, positive for multifocal consolidation  Continue Rocephin  Continue bronchodilators  Inhaled corticosteroids  Incentive spirometer    COPD exacerbation  Continue bronchodilators  Continue inhaled steroids  Pulmonology consulted and evaluation appreciated    History of left breast cancer  Chronic and stable    Venous embolism prophylaxis: SCDs  Code: Full code

## 2023-11-22 NOTE — PROGRESS NOTES
Daily Progress Note          Assessment    Multifocal pneumonia  Hypoxemia  JAMES  COPD 2/2 2nd hand smoking  Fever  Hx left breast cancer  Elevated troponin  HTN  Morbid obesity   Anxiety  Echo 11/21/2023: EF 61-65% with normal RV systolic pressure  CT scan of the chest 11/20/2023 negative for PE, positive for multifocal consolidation     PLAN:  Oxygen supplement and titration to maintain saturation 90-95%: Currently requiring 2 L per nasal cannula  Antibiotics: Rocephin and discontinue doxycycline  Mucinex  Bronchodilators  Inhaled corticosteroids  Incentive spirometer    May use home CPAP machine   BP control  DVT prophylaxis    I personally reviewed the radiological images             LOS: 2 days     Subjective     Patient reports cough and shortness of breath    Objective     Vital signs for last 24 hours:  Vitals:    11/22/23 0654 11/22/23 0658 11/22/23 0659 11/22/23 0703   BP:    116/85   BP Location:    Right arm   Patient Position:    Lying   Pulse: 69  67    Resp: 24 24 24 24   Temp:    98.2 °F (36.8 °C)   TempSrc:    Oral   SpO2: 91%  100%    Weight:       Height:           Intake/Output last 3 shifts:  I/O last 3 completed shifts:  In: 2860 [P.O.:1560; I.V.:1000; IV Piggyback:300]  Out: 800 [Urine:800]  Intake/Output this shift:  No intake/output data recorded.      Radiology  Imaging Results (Last 24 Hours)       ** No results found for the last 24 hours. **            Labs:  Results from last 7 days   Lab Units 11/21/23  0352   WBC 10*3/mm3 8.30   HEMOGLOBIN g/dL 14.3   HEMATOCRIT % 42.4   PLATELETS 10*3/mm3 240     Results from last 7 days   Lab Units 11/21/23  0352 11/20/23  1517   SODIUM mmol/L 142 144   POTASSIUM mmol/L 3.5 3.7   CHLORIDE mmol/L 99 103   CO2 mmol/L 33.0* 31.0*   BUN mg/dL 21 27*   CREATININE mg/dL 0.89 0.77   CALCIUM mg/dL 9.7 9.8   BILIRUBIN mg/dL  --  0.3   ALK PHOS U/L  --  97   ALT (SGPT) U/L  --  21   AST (SGOT) U/L  --  23   GLUCOSE mg/dL 91 99         Results from last 7 days    Lab Units 11/20/23  1517   ALBUMIN g/dL 3.4*     Results from last 7 days   Lab Units 11/20/23  1517   HSTROP T ng/L 17*                           Meds:   SCHEDULE  anastrozole, 1 mg, Oral, Daily  budesonide, 0.5 mg, Nebulization, BID - RT  cefTRIAXone, 2,000 mg, Intravenous, Q24H  citalopram, 40 mg, Oral, Daily  doxycycline, 100 mg, Intravenous, Q12H  ipratropium-albuterol, 3 mL, Nebulization, 4x Daily - RT  lisinopril, 10 mg, Oral, Daily      Infusions  sodium chloride, 50 mL/hr, Last Rate: 50 mL/hr (11/21/23 0829)      PRNs    acetaminophen    ipratropium-albuterol    sodium chloride    zolpidem    Physical Exam:  General Appearance:  Alert   HEENT:  Normocephalic, without obvious abnormality, Conjunctiva/corneas clear,.   Nares normal, no drainage     Neck:  Supple, symmetrical, trachea midline.   Lungs /Chest wall:   Bilateral basal rhonchi, respirations unlabored, symmetrical wall movement.     Heart:  Regular rate and rhythm, S1 S2 normal  Abdomen: Soft, non-tender, no masses, no organomegaly.    Extremities: No edema, no clubbing or cyanosis     ROS  Constitutional: Negative for chills, fever and malaise/fatigue.   HENT: Negative.    Eyes: Negative.    Cardiovascular: Negative.    Respiratory: Positive for cough and shortness of breath.    Skin: Negative.    Musculoskeletal: Negative.    Gastrointestinal: Negative.    Genitourinary: Negative.    Neurological: Negative.    Psychiatric/Behavioral: Negative.      I reviewed the recent clinical results  I personally reviewed the latest radiological studies    Part of this note may be an electronic transcription/translation of spoken language to printed text using the Dragon Dictation System.

## 2023-11-22 NOTE — PLAN OF CARE
Goal Outcome Evaluation:   Patient was admitted due to pneumonia and COPD exacerbation. Will return home when medically stable.

## 2023-11-22 NOTE — PLAN OF CARE
Goal Outcome Evaluation:  Plan of Care Reviewed With: patient        Progress: improving  Outcome Evaluation: Patient remains on O2 at 2L, no complaints overnight.

## 2023-11-23 ENCOUNTER — READMISSION MANAGEMENT (OUTPATIENT)
Dept: CALL CENTER | Facility: HOSPITAL | Age: 68
End: 2023-11-23
Payer: MEDICARE

## 2023-11-23 VITALS
RESPIRATION RATE: 20 BRPM | WEIGHT: 244 LBS | HEART RATE: 77 BPM | SYSTOLIC BLOOD PRESSURE: 101 MMHG | HEIGHT: 65 IN | DIASTOLIC BLOOD PRESSURE: 44 MMHG | OXYGEN SATURATION: 90 % | BODY MASS INDEX: 40.65 KG/M2 | TEMPERATURE: 98 F

## 2023-11-23 PROBLEM — R06.00 DYSPNEA: Status: RESOLVED | Noted: 2023-11-21 | Resolved: 2023-11-23

## 2023-11-23 PROBLEM — R79.89 ELEVATED TROPONIN: Status: RESOLVED | Noted: 2023-11-20 | Resolved: 2023-11-23

## 2023-11-23 PROBLEM — R50.9 FEVER: Status: RESOLVED | Noted: 2023-11-20 | Resolved: 2023-11-23

## 2023-11-23 PROCEDURE — 94799 UNLISTED PULMONARY SVC/PX: CPT

## 2023-11-23 PROCEDURE — 25010000002 ENOXAPARIN PER 10 MG: Performed by: INTERNAL MEDICINE

## 2023-11-23 RX ADMIN — GUAIFENESIN 1200 MG: 600 TABLET, MULTILAYER, EXTENDED RELEASE ORAL at 08:38

## 2023-11-23 RX ADMIN — ENOXAPARIN SODIUM 40 MG: 100 INJECTION SUBCUTANEOUS at 09:00

## 2023-11-23 RX ADMIN — CITALOPRAM HYDROBROMIDE 40 MG: 20 TABLET ORAL at 08:38

## 2023-11-23 RX ADMIN — Medication 10 ML: at 08:38

## 2023-11-23 RX ADMIN — ANASTROZOLE 1 MG: 1 TABLET ORAL at 08:38

## 2023-11-23 RX ADMIN — LISINOPRIL 10 MG: 5 TABLET ORAL at 08:38

## 2023-11-23 NOTE — PLAN OF CARE
Problem: Fall Injury Risk  Goal: Absence of Fall and Fall-Related Injury  Intervention: Promote Injury-Free Environment  Recent Flowsheet Documentation  Taken 11/23/2023 0300 by Mercedez Bee RN  Safety Promotion/Fall Prevention: safety round/check completed  Taken 11/23/2023 0200 by Mercedez Bee RN  Safety Promotion/Fall Prevention: safety round/check completed  Taken 11/23/2023 0000 by Mercedez Bee RN  Safety Promotion/Fall Prevention: safety round/check completed  Taken 11/22/2023 2300 by Mercedez Bee RN  Safety Promotion/Fall Prevention: safety round/check completed  Taken 11/22/2023 2200 by Mercedez Bee RN  Safety Promotion/Fall Prevention: safety round/check completed  Taken 11/22/2023 1915 by Mercedez Bee RN  Safety Promotion/Fall Prevention: safety round/check completed   Goal Outcome Evaluation:  Plan of Care Reviewed With: patient        Progress: improving

## 2023-11-23 NOTE — OUTREACH NOTE
Prep Survey      Flowsheet Row Responses   [facility] facility patient discharged from? [facility]   Is LACE score < 7 ? No   Eligibility Readm Mgmt   Discharge diagnosis *Pneumonia (J1   Does the patient have one of the following disease processes/diagnoses(primary or secondary)? Pneumonia   Does the patient have Home health ordered? No   Is there a DME ordered? No   Prep survey completed? Yes            JENNA MENDOZA - Registered Nurse

## 2023-11-23 NOTE — DISCHARGE SUMMARY
[facility] Medicine Services  Discharge Summary    Date of Service: 23  Patient Name: Mary Ann Michaels  : 1955  MRN: [id number]    Date of Admission: 2023  Date of Discharge:  23  Primary Care Physician: Tasia Siu, SAMANTA    Discharge Diagnosis: CAP    Presenting Problem:   Hypoxia [R09.02]  Fever [R50.9]  Multifocal pneumonia [J18.9]  Dyspnea, unspecified type [R06.00]    Active and Resolved Hospital Problems:  Active Hospital Problems    Diagnosis POA    **Pneumonia [J18.9] Yes    Elevated brain natriuretic peptide (BNP) level [R79.89] Yes    Anxiety disorder [F41.9] Yes    Essential hypertension [I10] Yes    Morbid obesity [E66.01] Yes    Breast cancer of upper-outer quadrant of left female breast [C50.412] Yes      Resolved Hospital Problems    Diagnosis POA    Dyspnea [R06.00] Yes    Fever [R50.9] Yes    Elevated troponin [R79.89] Yes            As per the HPI of the H&P:   Mary Ann Michaels is a 68 y.o. female with a past medical history of anxiety depression, female stress incontinence, GERD, history of breast cancer on radiation therapy, hypertension, insomnia, who presented to [facility] on 2023 complaining of increased shortness of breath and productive cough.  She reports she has had the problem for about a month and initially went to urgent care and was given an inhaler and some steroids.  She subsequently went to her PCP and she took 7 days worth of antibiotics without relief.  She denies any chest pain.  She reports some fever.  She reports her cough was initially productive of green sputum and is now yellow.  No bilateral lower extremity edema.  Review of records shows she has been fighting an upper respiratory infection/bronchitis with multiple visits to primary care providers in urgent care since 2023 last visit 2023.  Diagnostics in ED today showed: High-sensitivity troponin 17, proBNP 2805, BUN 27, D-dimer 1.60 WBC 8.9,  "respiratory panel negative, EKG shows sinus bradycardia heart rate 53 right bundle branch block     Chest x-ray per radiology:     \"1. Cardiomegaly with evidence of mild pulmonary vascular congestion and interstitial edema.  2. Increased density in the left hemithorax some of which represents airspace disease and some overlying soft tissue density. Recommend a PA and lateral chest radiograph.  \"     CTA chest per radiology:     \"Impression:  No definite evidence of pulmonary embolism.   Multifocal patchy opacities with areas of consolidation in the lingula and right middle lobe are highly concerning for multifocal/atypical pneumonia.  \"     She denies any cardiovascular disease or known congestive heart failure.  She was started on IV ceftriaxone and IV doxycycline in ED with blood and sputum cultures pending.  One-time dose 40 mg IV Lasix was ordered.  2D echo ordered and pulmonary has been consulted.  May need cardiology consult pending echo results.      Hospital Course:   Patient was admitted with findings of community-acquired pneumonia.  She received IV antibiotics, bronchodilators, and steroids.  The patient was seen and examined at bedside today, 11/23/2023.  She is medically optimized for discharge home.  She had a walk test and will require home oxygen.  The patient had a 6-minute walk test which resulted in the need for home oxygen.  She will need 3 L of oxygen nasal cannula 24/7.  Follow-up with primary care physician within 7 days of discharge.  Continue antibiotics prescribed prior and also steroids for another 5 days.  Patient states she understands discharge instructions.  Patient denies fever, chills, cough, nausea, vomit, chest pain, shortness of breath, and abdomen pain.      Assessment and plan  Pneumonia  Echo 11/21/2023: EF 61-65% with normal RV systolic pressure  CT scan of the chest 11/20/2023 negative for PE, positive for multifocal consolidation     COPD exacerbation  Continue " "bronchodilators  Continue inhaled steroids  Pulmonology consulted and evaluation appreciated     History of left breast cancer  Chronic and stable           Discharge Exam    /44 (BP Location: Left arm, Patient Position: Lying)   Pulse 77   Temp 98 °F (36.7 °C) (Oral)   Resp 20   Ht 165.1 cm (65\")   Wt 111 kg (244 lb)   LMP  (LMP Unknown)   SpO2 90%   BMI 40.60 kg/m²     General: Not in any acute distress  HEENT: Normocephalic, atraumatic  Neck: Supple, No JVD  CV: Regular rate and rhythm, S1 and S2 are normal, no murmurs/rubs/or gallops  Lungs: Clear to auscultation bilaterally, no rales/rhonchi/wheezes  Abdomen: Soft, non-distended, non-tender, bowel sounds present  EXT: No edema of lower extremities  Neuro: Cranial nerves II through XII intact  Skin: Intact, no rashes, no lesions, no erythema    Discharge Medications:     Discharge Medications        Continue These Medications        Instructions Start Date   acetaminophen 325 MG tablet  Commonly known as: TYLENOL   325 mg, Oral, Every 4 Hours PRN      anastrozole 1 MG tablet  Commonly known as: ARIMIDEX   TAKE ONE TABLET BY MOUTH DAILY      cefdinir 300 MG capsule  Commonly known as: OMNICEF   300 mg, Oral, 2 Times Daily      citalopram 40 MG tablet  Commonly known as: CeleXA   1 tablet, Oral, Daily      lisinopril 10 MG tablet  Commonly known as: PRINIVIL,ZESTRIL   10 mg, Oral, Daily      methylPREDNISolone 4 MG tablet  Commonly known as: MEDROL   4 mg, Oral, Daily      zolpidem 5 MG tablet  Commonly known as: AMBIEN   1 tablet, Oral, Nightly PRN                  Diet:  Hospital:  Diet Order   Procedures    Diet: Regular/House Diet; Texture: Regular Texture (IDDSI 7); Fluid Consistency: Thin (IDDSI 0)       Discharge Disposition:  Home      Discharge Activity:   As tolerated    CODE STATUS:  Code Status and Medical Interventions:   Ordered at: 11/20/23 1941     Code Status (Patient has no pulse and is not breathing):    CPR (Attempt to " Resuscitate)     Medical Interventions (Patient has pulse or is breathing):    Full Support         Future Appointments   Date Time Provider Department Center   12/29/2023  9:20 AM Alfonzo Yu APRN [facility]   4/3/2024  1:15 PM Cheli Tracey PT  [facility]   12/27/2024 11:30 AM Ron Cook MD [facility] None         Time spent on Discharge including face to face service:  >30 minutes    Signature: Electronically signed by Jaydon Cota MD, 11/23/23, 14:56 EST.  [facility] Hospitalist Team

## 2023-11-23 NOTE — PROGRESS NOTES
Exercise Oximetry    Patient Name:Mary Ann Michaels   MRN: [id number]   Date: 11/23/23             ROOM AIR BASELINE   SpO2% 90   Heart Rate 87   Blood Pressure      EXERCISE ON ROOM AIR SpO2% EXERCISE ON O2 @ 3LPM SpO2%   1 MINUTE 88 1 MINUTE    2 MINUTES  2 MINUTES 89 2L   3 MINUTES  3 MINUTES 87 3L   4 MINUTES  4 MINUTES 90   5 MINUTES  5 MINUTES 93   6 MINUTES  6 MINUTES 93              Distance Walked   Distance Walked 6 minutes   Dyspnea (Archie Scale)   Dyspnea (Archie Scale)   Fatigue (Archie Scale)   Fatigue (Archie Scale)   SpO2% Post Exercise   SpO2% Post Exercise 94   HR Post Exercise   HR Post Exercise 88   Time to Recovery   Time to Recovery     Comments: On room air pt's SPO2 was 90% upon ambulation pt's SpO2 dropped to 88% and 2L's of oxygen was applied . The pt's stated feeling weak so we stood for a few minutes to recover. With ambulation pt's SpO2 dropped to 88-87% and 3L's of oxygen was applied. Pt could ambulate with 3L's of oxygen and maintained a SpO2 of 93-94%.

## 2023-11-23 NOTE — PROGRESS NOTES
Daily Progress Note          Assessment    Multifocal pneumonia  Hypoxemia: Patient had a 6-minute walk 11/23/2023 showing hypoxemia after 1 minute  JAMES  COPD 2/2 2nd hand smoking  Fever  Hx left breast cancer  Elevated troponin  HTN  Morbid obesity   Anxiety  Echo 11/21/2023: EF 61-65% with normal RV systolic pressure  CT scan of the chest 11/20/2023 negative for PE, positive for multifocal consolidation     PLAN:  Oxygen supplement and titration to maintain saturation 90-95%: Currently requiring 2 L per nasal cannula: Patient is stable from pulmonary standpoint to be discharged home on home oxygen of 3 L, follow-up in our pulmonary clinic in 3 weeks  Antibiotics: Rocephin   Mucinex  Bronchodilators  Inhaled corticosteroids  Incentive spirometer    May use home CPAP machine   BP control  DVT prophylaxis    I personally reviewed the radiological images             LOS: 3 days     Subjective     Patient reports cough and shortness of breath    Objective     Vital signs for last 24 hours:  Vitals:    11/23/23 0237 11/23/23 0300 11/23/23 0430 11/23/23 1134   BP:   131/82 101/44   BP Location:   Right arm Left arm   Patient Position:   Lying Lying   Pulse: 71 65 74 77   Resp:   15 20   Temp:   98.7 °F (37.1 °C) 98 °F (36.7 °C)   TempSrc:   Oral Oral   SpO2: 93% 94% 95% 90%   Weight:       Height:           Intake/Output last 3 shifts:  I/O last 3 completed shifts:  In: 240 [P.O.:240]  Out: -   Intake/Output this shift:  I/O this shift:  In: 358 [P.O.:358]  Out: -       Radiology  Imaging Results (Last 24 Hours)       ** No results found for the last 24 hours. **            Labs:  Results from last 7 days   Lab Units 11/21/23  0352   WBC 10*3/mm3 8.30   HEMOGLOBIN g/dL 14.3   HEMATOCRIT % 42.4   PLATELETS 10*3/mm3 240     Results from last 7 days   Lab Units 11/21/23  0352 11/20/23  1517   SODIUM mmol/L 142 144   POTASSIUM mmol/L 3.5 3.7   CHLORIDE mmol/L 99 103   CO2 mmol/L 33.0* 31.0*   BUN mg/dL 21 27*   CREATININE  mg/dL 0.89 0.77   CALCIUM mg/dL 9.7 9.8   BILIRUBIN mg/dL  --  0.3   ALK PHOS U/L  --  97   ALT (SGPT) U/L  --  21   AST (SGOT) U/L  --  23   GLUCOSE mg/dL 91 99         Results from last 7 days   Lab Units 11/20/23  1517   ALBUMIN g/dL 3.4*     Results from last 7 days   Lab Units 11/20/23  1517   HSTROP T ng/L 17*                           Meds:   SCHEDULE  anastrozole, 1 mg, Oral, Daily  budesonide, 0.5 mg, Nebulization, BID - RT  cefTRIAXone, 2,000 mg, Intravenous, Q24H  citalopram, 40 mg, Oral, Daily  enoxaparin, 40 mg, Subcutaneous, BID  guaiFENesin, 1,200 mg, Oral, Q12H  ipratropium-albuterol, 3 mL, Nebulization, 4x Daily - RT  lisinopril, 10 mg, Oral, Daily      Infusions  sodium chloride, 50 mL/hr, Last Rate: 50 mL/hr (11/21/23 0829)      PRNs    acetaminophen    ipratropium-albuterol    sodium chloride    zolpidem    Physical Exam:  General Appearance:  Alert   HEENT:  Normocephalic, without obvious abnormality, Conjunctiva/corneas clear,.   Nares normal, no drainage     Neck:  Supple, symmetrical, trachea midline.   Lungs /Chest wall:   Bilateral basal rhonchi, respirations unlabored, symmetrical wall movement.     Heart:  Regular rate and rhythm, S1 S2 normal  Abdomen: Soft, non-tender, no masses, no organomegaly.    Extremities: No edema, no clubbing or cyanosis     ROS  Constitutional: Negative for chills, fever and malaise/fatigue.   HENT: Negative.    Eyes: Negative.    Cardiovascular: Negative.    Respiratory: Positive for cough and shortness of breath.    Skin: Negative.    Musculoskeletal: Negative.    Gastrointestinal: Negative.    Genitourinary: Negative.    Neurological: Negative.    Psychiatric/Behavioral: Negative.      I reviewed the recent clinical results  I personally reviewed the latest radiological studies    Part of this note may be an electronic transcription/translation of spoken language to printed text using the Dragon Dictation System.

## 2023-11-24 LAB
BACTERIA SPEC RESP CULT: NORMAL
GRAM STN SPEC: NORMAL

## 2023-11-24 NOTE — CASE MANAGEMENT/SOCIAL WORK
Case Management Discharge Note      Final Note: Home with 3l 02 thru [place].         Transportation Services  Private: Car    Final Discharge Disposition Code: 01 - home or self-care

## 2023-11-24 NOTE — CASE MANAGEMENT/SOCIAL WORK
Case Management Discharge Note      Final Note: Routine home.         Selected Continued Care - Discharged on 11/23/2023 Admission date: 11/20/2023 - Discharge disposition: Home or Self Care      Durable Medical Equipment Coordination complete.      Service Provider Selected Services Address Phone Fax Patient Preferred    [facility] Durable Medical Equipment [address] [address] [phone] --               Transportation Services  Private: Car    Final Discharge Disposition Code: 01 - home or self-care

## 2023-11-24 NOTE — CASE MANAGEMENT/SOCIAL WORK
Continued Stay Note   [facility]     Patient Name: Mary Ann Michaels  MRN: [id number]  Today's Date: 11/24/2023    Admit Date: 11/20/2023    Plan: Routine home. New home O2 w/ [place].   Discharge Plan       Row Name 11/24/23 1310       Plan    Plan Routine home. New home O2 w/ [place].    Plan Comments CM confirmed with primary nurse and on-call CM that [facility]             Megan Naegele, RN      Office Phone: [phone]  Office Cell: [phone]

## 2023-11-24 NOTE — DISCHARGE PLACEMENT REQUEST
"Mary Ann Walker DYAN (68 y.o. Female)       Date of Birth   1955    Social Security Number       Address   [address] [place] [address] [place] [address]    Home Phone   [phone]    MRN   [id number]       Taoist   None    Marital Status                               Admission Date   11/20/23    Admission Type   Emergency    Admitting Provider   Sylwia Pinzon DO    Attending Provider       Department, Room/Bed   [facility] 3C MEDICAL INPATIENT, 358/1       Discharge Date   11/23/2023    Discharge Disposition   Home or Self Care    Discharge Destination                                 Attending Provider: (none)   Allergies: No Known Allergies    Isolation: None   Infection: None   Code Status: Prior    Ht: 165.1 cm (65\")   Wt: 111 kg (244 lb)    Admission Cmt: None   Principal Problem: Pneumonia [J18.9]                   Active Insurance as of 11/20/2023       Primary Coverage       Payor Plan Insurance Group Employer/Plan Group    MEDICARE MEDICARE A & B        Payor Plan Address Payor Plan Phone Number Payor Plan Fax Number Effective Dates    [address] [phone]  8/1/2019 - None Entered    [facility] [id number]         Subscriber Name Subscriber Birth Date Member ID       MARY ANN WALKER 1955 [id number]               Secondary Coverage       Payor Plan Insurance Group Employer/Plan Group    [employer]        Payor Plan Address Payor Plan Phone Number Payor Plan Fax Number Effective Dates    [address] [phone]  1/1/2023 - None Entered    [facility] [id number]         Subscriber Name Subscriber Birth Date Member ID       MARY ANN WALKER 1955 [id number]                     Emergency Contacts        (Rel.) Home Phone Work Phone Mobile Phone    KELTON WALKER (Spouse) -- -- [phone]    RAKESH GONZALES (Daughter) [phone] -- --          "

## 2023-11-25 LAB
BACTERIA SPEC AEROBE CULT: NORMAL
BACTERIA SPEC AEROBE CULT: NORMAL

## 2023-11-25 NOTE — CASE MANAGEMENT/SOCIAL WORK
Continued Stay Note  INDIGO Gilbert     Patient Name: Mary Ann Michaels  MRN: [id number]  Today's Date: 11/25/2023    Admit Date: 11/20/2023    Plan: Routine home. New home O2 w/ [place].   Discharge Plan       Row Name 11/25/23 1433       Plan    Plan Comments Received call from patient that she never received her oxygen set-up through [facility] [place] [facility] at home, and was discharged 11/23. Call placed to [facility] and spoke with Santa, she said they will be sending a  out asap to get patient set-up at home.                      Expected Discharge Date and Time       Expected Discharge Date Expected Discharge Time    Nov 23, 2023               Ilsa Monge RN

## 2023-11-28 ENCOUNTER — READMISSION MANAGEMENT (OUTPATIENT)
Dept: CALL CENTER | Facility: HOSPITAL | Age: 68
End: 2023-11-28
Payer: MEDICARE

## 2023-11-28 NOTE — OUTREACH NOTE
COPD/PN Week 1 Survey      Flowsheet Row Responses   [facility]? [facility]   Does the patient have one of the following disease processes/diagnoses(primary or secondary)? Pneumonia   Week 1 attempt successful? No   Unsuccessful attempts Attempt 1            Adeola MENDOZA - Registered Nurse

## 2023-12-01 ENCOUNTER — READMISSION MANAGEMENT (OUTPATIENT)
Dept: CALL CENTER | Facility: HOSPITAL | Age: 68
End: 2023-12-01
Payer: MEDICARE

## 2023-12-01 NOTE — OUTREACH NOTE
COPD/PN Week 1 Survey      Flowsheet Row Responses   [facility] patient discharged from? [facility]   Does the patient have one of the following disease processes/diagnoses(primary or secondary)? Pneumonia   Week 1 attempt successful? Yes   Call start time 0853   Call end time 0857   Discharge diagnosis *Pneumonia (J1   Is patient permission given to speak with other caregiver? Yes   List who call center can speak with Spouse   Person spoke with today (if not patient) and relationship Spouse   Meds reviewed with patient/caregiver? Yes   Is the patient having any side effects they believe may be caused by any medication additions or changes? No   Does the patient have all medications ordered at discharge? Yes   Is the patient taking all medications as directed (includes completed medication regime)? Yes   Does the patient have a primary care provider?  Yes   Does the patient have an appointment with their PCP or specialist within 7 days of discharge? Yes   Comments regarding PCP PCP follow up 12/5/23   Has the patient kept scheduled appointments due by today? N/A   Has home health visited the patient within 72 hours of discharge? N/A   Pulse Ox monitoring Intermittent   Pulse Ox device source Patient   O2 Sat comments Spouse reports O2 sats in 90's   O2 Sat: education provided Sat levels, Monitoring frequency, When to seek care   Psychosocial issues? No   Did the patient receive a copy of their discharge instructions? Yes   Nursing interventions Reviewed instructions with patient   What is the patient's perception of their health status since discharge? Improving   Nursing Interventions Nurse provided patient education   Are the patient's immunizations up to date?  Yes   Nursing interventions Educated on importance of maintaining up to date immunizations as advised by provider   If the patient is a current smoker, are they able to teach back resources for cessation? Not a smoker   Is the patient/caregiver able to  teach back the hierarchy of who to call/visit for symptoms/problems? PCP, Specialist, Home health nurse, Urgent Care, ED, 911 Yes   Is the patient/caregiver able to teach back signs and symptoms of worsening condition: Shortness of breath, Chest pain, Fever/chills   Is the patient/caregiver able to teach back importance of completing antibiotic course of treatment? --  [N/A]   Week 1 call completed? Yes   Is the patient interested in additional calls from an ambulatory ? No   Call end time 0857            Riddhi MENDOZA - Registered Nurse

## 2023-12-07 ENCOUNTER — TELEPHONE (OUTPATIENT)
Dept: SURGERY | Facility: CLINIC | Age: 68
End: 2023-12-07
Payer: MEDICARE

## 2023-12-07 NOTE — TELEPHONE ENCOUNTER
Lvm for pt to call back to reschedule appt with miya lo on 12/29 due to provider not being in office

## 2023-12-11 ENCOUNTER — TELEPHONE (OUTPATIENT)
Dept: SURGERY | Facility: CLINIC | Age: 68
End: 2023-12-11
Payer: MEDICARE

## 2023-12-19 ENCOUNTER — TELEPHONE (OUTPATIENT)
Dept: ONCOLOGY | Facility: CLINIC | Age: 68
End: 2023-12-19
Payer: MEDICARE

## 2023-12-19 NOTE — TELEPHONE ENCOUNTER
----- Message from Alyssa Monge RN sent at 12/19/2023  9:51 AM EST -----  I called her with some results, and she mentioned she needed an appt.  She had canceled her last appt bc her  was sick and then she got sick.  Can be after the first of the year    Thank you

## 2023-12-19 NOTE — TELEPHONE ENCOUNTER
----- Message from Barbie Dhillon MD sent at 12/19/2023  8:12 AM EST -----  DEXA normal.     ----- Message -----  From: Dashawn Villalba Incoming  Sent: 12/18/2023   4:09 PM EST  To: Barbie Dhillon MD

## 2023-12-19 NOTE — TELEPHONE ENCOUNTER
Reviewed Dexa report with pt per Dr Dhillon's request. No questions or concerns.  She did request an appt as her last one she had to cancel due to illness. Scheduling notified.

## 2023-12-22 ENCOUNTER — TRANSCRIBE ORDERS (OUTPATIENT)
Dept: ADMINISTRATIVE | Facility: HOSPITAL | Age: 68
End: 2023-12-22
Payer: MEDICARE

## 2023-12-22 DIAGNOSIS — J44.9 CHRONIC OBSTRUCTIVE PULMONARY DISEASE, UNSPECIFIED COPD TYPE: Primary | ICD-10-CM

## 2024-01-08 ENCOUNTER — TELEPHONE (OUTPATIENT)
Dept: SURGERY | Facility: CLINIC | Age: 69
End: 2024-01-08
Payer: MEDICARE

## 2024-01-08 NOTE — TELEPHONE ENCOUNTER
Spoke to pt and she said she will have to give us a call back to [facility] her appt with miya lo

## 2024-01-15 ENCOUNTER — TELEPHONE (OUTPATIENT)
Dept: SURGERY | Facility: CLINIC | Age: 69
End: 2024-01-15
Payer: MEDICARE

## 2024-01-15 ENCOUNTER — OFFICE VISIT (OUTPATIENT)
Dept: ONCOLOGY | Facility: CLINIC | Age: 69
End: 2024-01-15
Payer: MEDICARE

## 2024-01-15 VITALS
SYSTOLIC BLOOD PRESSURE: 143 MMHG | BODY MASS INDEX: 41.55 KG/M2 | HEIGHT: 65 IN | RESPIRATION RATE: 18 BRPM | WEIGHT: 249.4 LBS | OXYGEN SATURATION: 95 % | DIASTOLIC BLOOD PRESSURE: 85 MMHG | TEMPERATURE: 97.5 F | HEART RATE: 54 BPM

## 2024-01-15 DIAGNOSIS — Z17.0 MALIGNANT NEOPLASM OF UPPER-OUTER QUADRANT OF LEFT BREAST IN FEMALE, ESTROGEN RECEPTOR POSITIVE: Primary | ICD-10-CM

## 2024-01-15 DIAGNOSIS — C50.412 MALIGNANT NEOPLASM OF UPPER-OUTER QUADRANT OF LEFT BREAST IN FEMALE, ESTROGEN RECEPTOR POSITIVE: Primary | ICD-10-CM

## 2024-01-15 PROCEDURE — 1160F RVW MEDS BY RX/DR IN RCRD: CPT | Performed by: INTERNAL MEDICINE

## 2024-01-15 PROCEDURE — 1159F MED LIST DOCD IN RCRD: CPT | Performed by: INTERNAL MEDICINE

## 2024-01-15 PROCEDURE — 1126F AMNT PAIN NOTED NONE PRSNT: CPT | Performed by: INTERNAL MEDICINE

## 2024-01-15 PROCEDURE — 99214 OFFICE O/P EST MOD 30 MIN: CPT | Performed by: INTERNAL MEDICINE

## 2024-01-15 PROCEDURE — 3077F SYST BP >= 140 MM HG: CPT | Performed by: INTERNAL MEDICINE

## 2024-01-15 PROCEDURE — 3079F DIAST BP 80-89 MM HG: CPT | Performed by: INTERNAL MEDICINE

## 2024-01-15 RX ORDER — NITROFURANTOIN 25; 75 MG/1; MG/1
100 CAPSULE ORAL
COMMUNITY
Start: 2024-01-13 | End: 2024-01-20

## 2024-01-15 RX ORDER — FLUTICASONE PROPIONATE 50 MCG
1 SPRAY, SUSPENSION (ML) NASAL DAILY
COMMUNITY
Start: 2023-12-05

## 2024-01-15 NOTE — PROGRESS NOTES
Subjective   Mary Ann Michaels is a 68 y.o. female.  Referred by Dr. Isaac for left breast invasive lobular carcinoma    History of Present Illness    is a 66-year-old postmenopausal  lady with hypertension presented with a left breast screening abnormality.  Family history is significant for her daughter with DCIS and currently on tamoxifen at the age of 49, mother with history of ovarian cancer at the age of 67, maternal grandmother with history of pancreatic cancer in her 70s, 2 brothers with liver cancer.    12/7/2021-bilateral screening mammogram-new left breast focal asymmetry.  Diagnostic mammogram recommended.  Left breast ultrasound recommended.  Right breast is benign.    12/17/2021-left breast diagnostic mammogram and ultrasound  6 mm indistinct mass in the outer slightly upper left breast, posterior depth.  Ultrasound shows a 5 x 4 x 5 mm indistinct hypoechoic mass at 2:00, 9 cm from the nipple.  Ultrasound-guided biopsy of the left breast mass recommended.    1/12/2022-ultrasound-guided biopsy  1.specimen 1-left breast 2:00, 15 cm from the nipple-invasive lobular carcinoma, largest tumor focus 5 mm  Negative for carcinoma in situ or lymphovascular space invasion.  ER positive strong 100%  HI positive strong 100%  HER2 negative    Specimen to-left breast 2:00, 9 cm from the nipple  Invasive lobular carcinoma, focal atypical lobular hyperplasia  Negative for carcinoma in situ or lymphovascular space invasion.    1/30/2022-bilateral breast MRI  V shaped metallic clip is seen at the 2 o'clock position in the upper outer quadrant of the left breast which does not show any surrounding enhancement.  But this is the biopsy-proven site of malignancy.  This is posterior to a 5.2 cm clumped enhancement.  This is suspicious for additional disease.  No evidence of left axillary lymphadenopathy.  There is a more posterior located culture metallic clip in the posterior one third at 2 o'clock position  which is immediately adjacent to a 0.9 cm ill-defined enhancing lesion.  This represents biopsy-proven site of malignancy.  No other suspicious findings in the right breast.    She was seen by Dr. Isaac who performed a left mastectomy and sentinel lymph node biopsy on 3/30/2022  Pathology was consistent with invasive lobular carcinoma, 2 foci.  Larger focus measures 6 cm and the smaller focus measures 3.5 mm and noted in the nipple.  No skin ulceration.  7 sentinel lymph nodes were evaluated and negative for malignancy.  Both the tumors were strongly ER/DC positive at 99%, HER2 negative and Ki-67 was 12 to 13% on the larger tumor and 8% on the smaller tumor.    She is undergoing reconstruction by Dr. Montgomery.  She presents today accompanied by her friend Natalya to discuss adjuvant therapy.    Oncotype DX recurrence score noted to be 12 with a 9-year risk of distant metastasis of 3% with tamoxifen or AI alone.  And less than 1% benefit from chemotherapy.    Ms. Michaels has met with Dr. Cook and completed adjuvant radiation in July 2022.    Started anastrozole July 2022    Interval history  Patient presents today for follow-up.  She denies any new chest wall abnormalities or new breast lesions.  She is up-to-date on screening mammogram of the right breast which was done in December 2023 which was benign.  DEXA December 2023 was normal.  She has occasional hot flashes since starting anastrozole.  She is also experiencing increased hair loss over the past few months.  Denies any new bone pains cough abdominal pain nausea vomiting.    The following portions of the patient's history were reviewed and updated as appropriate: allergies, current medications, past family history, past medical history, past social history, past surgical history and problem list.    Past Medical History:   Diagnosis Date    Anxiety and depression     Female stress incontinence     GERD (gastroesophageal reflux disease)     Hiatal hernia      History of left breast cancer 2021    History of radiation therapy 2022    25 TREATMENTS    Hypertension     Insomnia     Sleep apnea     CPAP        Past Surgical History:   Procedure Laterality Date    BILATERAL BREAST REDUCTION Right 2023    Procedure: RIGHT REDUCTION FOR SYMMETRY;  Surgeon: Raj Montgomery MD;  Location: [facility];  Service: Plastics;  Laterality: Right;    BREAST RECONSTRUCTION Left 2022    Procedure: LEFT PLACEMENT TISSUE EXPANDER AND ALLODERM;  Surgeon: Raj Montgomery MD;  Location: [facility] OR;  Service: Plastics;  Laterality: Left;    BREAST TISSUE EXPANDER REMOVAL INSERTION OF IMPLANT Left 2023    Procedure: LEFT REMOVAL JTISSUE EXPANDER AND PLACEMENT OF IMPLANT;  Surgeon: Raj Montgomery MD;  Location: [facility];  Service: Plastics;  Laterality: Left;    CATARACT EXTRACTION W/ INTRAOCULAR LENS IMPLANT Bilateral     1-3-2023    1-     SECTION          CHOLECYSTECTOMY      COLONOSCOPY      ENDOSCOPY      FAT GRAFTING Left 2023    Procedure: FAT GRAFTING;  Surgeon: Raj Montgomery MD;  Location: [facility];  Service: Plastics;  Laterality: Left;    HYSTERECTOMY      KNEE SURGERY Right     ARTHROSCOPY TENDON REPAIR    MASTECTOMY W/ SENTINEL NODE BIOPSY Left 2022    Procedure: left total mastectomy with left axillary sentinel lymph node biopsy;  Surgeon: Catherine Isaac MD;  Location: [facility];  Service: General;  Laterality: Left;        Family History   Problem Relation Age of Onset    Ovarian cancer Mother 67    Lung cancer Father     Liver cancer Brother     Liver cancer Brother     Pancreatic cancer Maternal Grandmother     Malig Hyperthermia Neg Hx         Social History     Socioeconomic History    Marital status:    Tobacco Use    Smoking status: Never     Passive exposure: Never    Smokeless tobacco: Never   Vaping Use    Vaping Use: Never used   Substance and  "Sexual Activity    Alcohol use: Never    Drug use: Never    Sexual activity: Defer        OB History    No obstetric history on file.     Age at menarche-11  Age at first live childbirth-18  She had a hysterectomy and a bilateral salpingo-oophorectomy at the age of 39  No use of hormone replacement therapy  Oral contraceptive pill use for 2 to 3 years   3 para 2  1    No Known Allergies         Review of Systems   Constitutional:  Positive for unexpected weight gain.   HENT: Negative.     Eyes: Negative.    Respiratory: Negative.     Cardiovascular: Negative.    Gastrointestinal: Negative.    Endocrine: Negative.    Genitourinary: Negative.    Musculoskeletal: Negative.    Allergic/Immunologic: Negative.    Hematological: Negative.    Psychiatric/Behavioral: Negative.       Review of systems as mentioned HPI otherwise negative    Objective   Blood pressure 143/85, pulse 54, temperature 97.5 °F (36.4 °C), temperature source Temporal, resp. rate 18, height 165.1 cm (65\"), weight 113 kg (249 lb 6.4 oz), SpO2 95%, not currently breastfeeding.   Physical Exam  Vitals reviewed.   Constitutional:       Appearance: Normal appearance. She is obese.   HENT:      Head: Normocephalic.      Nose: Nose normal.      Mouth/Throat:      Mouth: Mucous membranes are moist.   Eyes:      Conjunctiva/sclera: Conjunctivae normal.      Pupils: Pupils are equal, round, and reactive to light.   Cardiovascular:      Rate and Rhythm: Normal rate.   Pulmonary:      Effort: Pulmonary effort is normal.   Abdominal:      General: Abdomen is flat.   Musculoskeletal:         General: Normal range of motion.      Cervical back: Normal range of motion.   Skin:     General: Skin is warm.   Neurological:      General: No focal deficit present.      Mental Status: She is alert and oriented to person, place, and time. Mental status is at baseline.   Psychiatric:         Mood and Affect: Mood normal.         Behavior: Behavior normal.        "  Thought Content: Thought content normal.         Judgment: Judgment normal.       Breast Exam: Left breast status post mastectomy with.  Right breast appears normal on inspection.   No other palpable abnormalities of the right breast.    I have reexamined the patient and the results are consistent with the previously documented exam. Barbie Dhillon MD      No visits with results within 30 Day(s) from this visit.   Latest known visit with results is:   Admission on 11/20/2023, Discharged on 11/23/2023   Component Date Value Ref Range Status    QT Interval 11/20/2023 526  ms Final    QTC Interval 11/20/2023 496  ms Final    Glucose 11/20/2023 99  65 - 99 mg/dL Final    BUN 11/20/2023 27 (H)  8 - 23 mg/dL Final    Creatinine 11/20/2023 0.77  0.57 - 1.00 mg/dL Final    Sodium 11/20/2023 144  136 - 145 mmol/L Final    Potassium 11/20/2023 3.7  3.5 - 5.2 mmol/L Final    Chloride 11/20/2023 103  98 - 107 mmol/L Final    CO2 11/20/2023 31.0 (H)  22.0 - 29.0 mmol/L Final    Calcium 11/20/2023 9.8  8.6 - 10.5 mg/dL Final    Total Protein 11/20/2023 6.8  6.0 - 8.5 g/dL Final    Albumin 11/20/2023 3.4 (L)  3.5 - 5.2 g/dL Final    ALT (SGPT) 11/20/2023 21  1 - 33 U/L Final    AST (SGOT) 11/20/2023 23  1 - 32 U/L Final    Alkaline Phosphatase 11/20/2023 97  39 - 117 U/L Final    Total Bilirubin 11/20/2023 0.3  0.0 - 1.2 mg/dL Final    Globulin 11/20/2023 3.4  gm/dL Final    A/G Ratio 11/20/2023 1.0  g/dL Final    BUN/Creatinine Ratio 11/20/2023 35.1 (H)  7.0 - 25.0 Final    Anion Gap 11/20/2023 10.0  5.0 - 15.0 mmol/L Final    eGFR 11/20/2023 83.6  >60.0 mL/min/1.73 Final    proBNP 11/20/2023 2,805.0 (H)  0.0 - 900.0 pg/mL Final    HS Troponin T 11/20/2023 17 (H)  <14 ng/L Final    ADENOVIRUS, PCR 11/20/2023 Not Detected  Not Detected Final    Coronavirus 229E 11/20/2023 Not Detected  Not Detected Final    Coronavirus HKU1 11/20/2023 Not Detected  Not Detected Final    Coronavirus NL63 11/20/2023 Not Detected  Not Detected  Final    Coronavirus OC43 11/20/2023 Not Detected  Not Detected Final    COVID19 11/20/2023 Not Detected  Not Detected - Ref. Range Final    Human Metapneumovirus 11/20/2023 Not Detected  Not Detected Final    Human Rhinovirus/Enterovirus 11/20/2023 Not Detected  Not Detected Final    Influenza A PCR 11/20/2023 Not Detected  Not Detected Final    Influenza B PCR 11/20/2023 Not Detected  Not Detected Final    Parainfluenza Virus 1 11/20/2023 Not Detected  Not Detected Final    Parainfluenza Virus 2 11/20/2023 Not Detected  Not Detected Final    Parainfluenza Virus 3 11/20/2023 Not Detected  Not Detected Final    Parainfluenza Virus 4 11/20/2023 Not Detected  Not Detected Final    RSV, PCR 11/20/2023 Not Detected  Not Detected Final    Bordetella pertussis pcr 11/20/2023 Not Detected  Not Detected Final    Bordetella parapertussis PCR 11/20/2023 Not Detected  Not Detected Final    Chlamydophila pneumoniae PCR 11/20/2023 Not Detected  Not Detected Final    Mycoplasma pneumo by PCR 11/20/2023 Not Detected  Not Detected Final    D-Dimer, Quantitative 11/20/2023 1.60 (H)  0.00 - 0.69 mg/L (FEU) Final    Extra Tube 11/20/2023 hold for add-on   Final    Auto resulted    Extra Tube 11/20/2023 Hold for add-ons.   Final    Auto resulted.    Extra Tube 11/20/2023 Hold for add-ons.   Final    Auto resulted    WBC 11/20/2023 8.90  3.40 - 10.80 10*3/mm3 Final    RBC 11/20/2023 4.50  3.77 - 5.28 10*6/mm3 Final    Hemoglobin 11/20/2023 13.8  12.0 - 15.9 g/dL Final    Hematocrit 11/20/2023 39.9  34.0 - 46.6 % Final    MCV 11/20/2023 88.7  79.0 - 97.0 fL Final    MCH 11/20/2023 30.6  26.6 - 33.0 pg Final    MCHC 11/20/2023 34.5  31.5 - 35.7 g/dL Final    RDW 11/20/2023 14.3  12.3 - 15.4 % Final    RDW-SD 11/20/2023 44.6  37.0 - 54.0 fl Final    MPV 11/20/2023 8.7  6.0 - 12.0 fL Final    Platelets 11/20/2023 247  140 - 450 10*3/mm3 Final    Scan Slide 11/20/2023    Final    See Manual Differential Results    Neutrophil % 11/20/2023  78.0 (H)  42.7 - 76.0 % Final    Lymphocyte % 11/20/2023 12.0 (L)  19.6 - 45.3 % Final    Monocyte % 11/20/2023 8.0  5.0 - 12.0 % Final    Eosinophil % 11/20/2023 1.0  0.3 - 6.2 % Final    Basophil % 11/20/2023 1.0  0.0 - 1.5 % Final    Neutrophils Absolute 11/20/2023 6.94  1.70 - 7.00 10*3/mm3 Final    Lymphocytes Absolute 11/20/2023 1.07  0.70 - 3.10 10*3/mm3 Final    Monocytes Absolute 11/20/2023 0.71  0.10 - 0.90 10*3/mm3 Final    Eosinophils Absolute 11/20/2023 0.09  0.00 - 0.40 10*3/mm3 Final    Basophils Absolute 11/20/2023 0.09  0.00 - 0.20 10*3/mm3 Final    Anisocytosis 11/20/2023 Slight/1+  None Seen Final    Poikilocytes 11/20/2023 Slight/1+  None Seen Final    WBC Morphology 11/20/2023 Normal  Normal Final    Platelet Morphology 11/20/2023 Normal  Normal Final    Blood Culture 11/20/2023 No growth at 5 days   Final    Blood Culture 11/20/2023 No growth at 5 days   Final    Procalcitonin 11/20/2023 0.07  0.00 - 0.25 ng/mL Final    LEGIONELLA ANTIGEN, URINE 11/20/2023 Negative  Negative Final    MRSA PCR 11/20/2023 No MRSA Detected  No MRSA Detected Final    Strep Pneumo Ag 11/20/2023 Negative  Negative Final    LVIDd 11/21/2023 4.9  cm Final    LVIDs 11/21/2023 3.7  cm Final    IVSd 11/21/2023 1.00  cm Final    LVPWd 11/21/2023 1.00  cm Final    FS 11/21/2023 24.5  % Final    IVS/LVPW 11/21/2023 1.00  cm Final    ESV(cubed) 11/21/2023 50.7  ml Final    EDV(cubed) 11/21/2023 117.6  ml Final    LV mass(C)d 11/21/2023 176.0  grams Final    LVOT area 11/21/2023 3.8  cm2 Final    LVOT diam 11/21/2023 2.20  cm Final    EDV(MOD-sp4) 11/21/2023 68.9  ml Final    ESV(MOD-sp4) 11/21/2023 19.0  ml Final    SV(MOD-sp4) 11/21/2023 49.9  ml Final    EF(MOD-sp4) 11/21/2023 72.4  % Final    MV E max john 11/21/2023 106.0  cm/sec Final    MV A max john 11/21/2023 91.2  cm/sec Final    MV dec time 11/21/2023 0.22  sec Final    MV E/A 11/21/2023 1.16   Final    Med Peak E' John 11/21/2023 8.6  cm/sec Final    Lat Peak E' John  11/21/2023 13.4  cm/sec Final    Avg E/e' ratio 11/21/2023 9.64   Final    SV(LVOT) 11/21/2023 107.2  ml Final    RVIDd 11/21/2023 3.2  cm Final    TAPSE (>1.6) 11/21/2023 3.4  cm Final    RV S' 11/21/2023 20.7  cm/sec Final    LA dimension (2D)  11/21/2023 4.1  cm Final    LV V1 max 11/21/2023 144.0  cm/sec Final    LV V1 max PG 11/21/2023 8.3  mmHg Final    LV V1 mean PG 11/21/2023 4.0  mmHg Final    LV V1 VTI 11/21/2023 28.2  cm Final    Ao pk winsome 11/21/2023 203.0  cm/sec Final    Ao max PG 11/21/2023 16.5  mmHg Final    Ao mean PG 11/21/2023 8.0  mmHg Final    Ao V2 VTI 11/21/2023 40.7  cm Final    BERTRAND(I,D) 11/21/2023 2.6  cm2 Final    MV max PG 11/21/2023 3.4  mmHg Final    MV mean PG 11/21/2023 2.00  mmHg Final    MV V2 VTI 11/21/2023 27.1  cm Final    MV P1/2t 11/21/2023 94.5  msec Final    MVA(P1/2t) 11/21/2023 2.33  cm2 Final    MVA(VTI) 11/21/2023 4.0  cm2 Final    MV dec slope 11/21/2023 313.0  cm/sec2 Final    RVOT diam 11/21/2023 2.5  cm Final    PA V2 max 11/21/2023 90.1  cm/sec Final    ACS 11/21/2023 2.10  cm Final    Sinus 11/21/2023 3.7  cm Final    Respiratory Culture 11/22/2023 Scant growth (1+) Normal respiratory roberto. No S. aureus or Pseudomonas aeruginosa detected. Final report.   Final    Gram Stain 11/22/2023 Moderate (3+) WBCs seen   Final    Gram Stain 11/22/2023 Rare (1+) Epithelial cells seen   Final    Gram Stain 11/22/2023 Rare (1+) Gram positive cocci in pairs   Final    Glucose 11/21/2023 91  65 - 99 mg/dL Final    BUN 11/21/2023 21  8 - 23 mg/dL Final    Creatinine 11/21/2023 0.89  0.57 - 1.00 mg/dL Final    Sodium 11/21/2023 142  136 - 145 mmol/L Final    Potassium 11/21/2023 3.5  3.5 - 5.2 mmol/L Final    Chloride 11/21/2023 99  98 - 107 mmol/L Final    CO2 11/21/2023 33.0 (H)  22.0 - 29.0 mmol/L Final    Calcium 11/21/2023 9.7  8.6 - 10.5 mg/dL Final    BUN/Creatinine Ratio 11/21/2023 23.6  7.0 - 25.0 Final    Anion Gap 11/21/2023 10.0  5.0 - 15.0 mmol/L Final    eGFR 11/21/2023  70.3  >60.0 mL/min/1.73 Final    WBC 11/21/2023 8.30  3.40 - 10.80 10*3/mm3 Final    RBC 11/21/2023 4.81  3.77 - 5.28 10*6/mm3 Final    Hemoglobin 11/21/2023 14.3  12.0 - 15.9 g/dL Final    Hematocrit 11/21/2023 42.4  34.0 - 46.6 % Final    MCV 11/21/2023 88.1  79.0 - 97.0 fL Final    MCH 11/21/2023 29.7  26.6 - 33.0 pg Final    MCHC 11/21/2023 33.8  31.5 - 35.7 g/dL Final    RDW 11/21/2023 14.9  12.3 - 15.4 % Final    RDW-SD 11/21/2023 48.6  37.0 - 54.0 fl Final    MPV 11/21/2023 9.0  6.0 - 12.0 fL Final    Platelets 11/21/2023 240  140 - 450 10*3/mm3 Final    Neutrophil % 11/21/2023 75.7  42.7 - 76.0 % Final    Lymphocyte % 11/21/2023 13.5 (L)  19.6 - 45.3 % Final    Monocyte % 11/21/2023 8.0  5.0 - 12.0 % Final    Eosinophil % 11/21/2023 2.3  0.3 - 6.2 % Final    Basophil % 11/21/2023 0.5  0.0 - 1.5 % Final    Neutrophils, Absolute 11/21/2023 6.30  1.70 - 7.00 10*3/mm3 Final    Lymphocytes, Absolute 11/21/2023 1.10  0.70 - 3.10 10*3/mm3 Final    Monocytes, Absolute 11/21/2023 0.70  0.10 - 0.90 10*3/mm3 Final    Eosinophils, Absolute 11/21/2023 0.20  0.00 - 0.40 10*3/mm3 Final    Basophils, Absolute 11/21/2023 0.00  0.00 - 0.20 10*3/mm3 Final    nRBC 11/21/2023 0.1  0.0 - 0.2 /100 WBC Final        No radiology results for the last 30 days.   2/28/2023 CBC reviewed and within normal limits  2/22/2023-CMP within normal limit    Assessment & Plan       *Left breast invasive lobular carcinoma  pT3 multifocal N0 M0  Clinical prognostic stage II, pathological prognostic stage I.   2 foci of invasive lobular carcinoma noted 1 was 60 mm and the second was 3.5 mm.  Margins negative  Both the tumors were strongly ER/MD positive HER2 negative with a low Ki-67, grade 1  Oncotype DX recurrence score 12 with a 3% risk of distant metastasis with AI or tamoxifen alone and less than 1% benefit from chemotherapy  Completed adjuvant radiation to the left breast.  Started anastrozole July 2022  Has occasional hot flashes secondary to  anastrozole  Right breast mammogram from December 2023 benign  No evidence of recurrent disease  Continue anastrozole for total of 7 to 10 years    *Bone health  DEXA from December 2021 reviewed and had a normal bone mineral density  DEXA scan December 2023 with normal bone density    *UTIs  She has about 2 UTIs per year and also has had urinary incontinence  She has been referred to urogynecology.  We discussed using vaginal moisturizers to prevent recurrent UTIs  Agree with urogyn evaluation    *Hypertension  Blood pressure somewhat elevated at 143/85  Continue current medication    *Hypercholesterolemia  Currently not on any medications  Continue follow-up with her primary care physician    *Alopecia  Could be secondary to anastrozole versus age    *Family history of breast cancer  Genetic testing performed and negative for any pathogenic mutation.    *Hbfvry-rh-3-month with SAMANTA in 8 months with MD

## 2024-01-22 ENCOUNTER — TELEPHONE (OUTPATIENT)
Dept: SURGERY | Facility: CLINIC | Age: 69
End: 2024-01-22
Payer: MEDICARE

## 2024-01-29 ENCOUNTER — TELEPHONE (OUTPATIENT)
Dept: SURGERY | Facility: CLINIC | Age: 69
End: 2024-01-29
Payer: MEDICARE

## 2024-01-29 NOTE — TELEPHONE ENCOUNTER
Lvm for pt to call back to [facility] fu with miya lo     Sent cx/no show letter via [place]

## 2024-05-06 ENCOUNTER — OFFICE VISIT (OUTPATIENT)
Dept: ONCOLOGY | Facility: CLINIC | Age: 69
End: 2024-05-06
Payer: MEDICARE

## 2024-05-06 VITALS
BODY MASS INDEX: 42.49 KG/M2 | WEIGHT: 255 LBS | SYSTOLIC BLOOD PRESSURE: 158 MMHG | OXYGEN SATURATION: 94 % | TEMPERATURE: 97.8 F | HEART RATE: 63 BPM | HEIGHT: 65 IN | RESPIRATION RATE: 18 BRPM | DIASTOLIC BLOOD PRESSURE: 66 MMHG

## 2024-05-06 DIAGNOSIS — Z79.811 LONG TERM (CURRENT) USE OF AROMATASE INHIBITORS: ICD-10-CM

## 2024-05-06 DIAGNOSIS — Z12.31 ENCOUNTER FOR SCREENING MAMMOGRAM FOR MALIGNANT NEOPLASM OF BREAST: ICD-10-CM

## 2024-05-06 DIAGNOSIS — C50.412 MALIGNANT NEOPLASM OF UPPER-OUTER QUADRANT OF LEFT BREAST IN FEMALE, ESTROGEN RECEPTOR POSITIVE: Primary | ICD-10-CM

## 2024-05-06 DIAGNOSIS — Z17.0 MALIGNANT NEOPLASM OF UPPER-OUTER QUADRANT OF LEFT BREAST IN FEMALE, ESTROGEN RECEPTOR POSITIVE: Primary | ICD-10-CM

## 2024-05-06 PROCEDURE — 1160F RVW MEDS BY RX/DR IN RCRD: CPT | Performed by: NURSE PRACTITIONER

## 2024-05-06 PROCEDURE — 3077F SYST BP >= 140 MM HG: CPT | Performed by: NURSE PRACTITIONER

## 2024-05-06 PROCEDURE — 99214 OFFICE O/P EST MOD 30 MIN: CPT | Performed by: NURSE PRACTITIONER

## 2024-05-06 PROCEDURE — 3078F DIAST BP <80 MM HG: CPT | Performed by: NURSE PRACTITIONER

## 2024-05-06 PROCEDURE — 1159F MED LIST DOCD IN RCRD: CPT | Performed by: NURSE PRACTITIONER

## 2024-05-06 PROCEDURE — 1126F AMNT PAIN NOTED NONE PRSNT: CPT | Performed by: NURSE PRACTITIONER

## 2024-05-06 RX ORDER — ANASTROZOLE 1 MG/1
1 TABLET ORAL DAILY
Qty: 90 TABLET | Refills: 3 | Status: SHIPPED | OUTPATIENT
Start: 2024-05-06

## 2024-05-06 RX ORDER — ROSUVASTATIN CALCIUM 20 MG/1
20 TABLET, COATED ORAL DAILY
COMMUNITY
Start: 2024-04-17 | End: 2025-04-17

## 2024-06-20 ENCOUNTER — OFFICE (AMBULATORY)
Dept: URBAN - METROPOLITAN AREA CLINIC 64 | Facility: CLINIC | Age: 69
End: 2024-06-20

## 2024-06-20 VITALS
WEIGHT: 256 LBS | DIASTOLIC BLOOD PRESSURE: 87 MMHG | SYSTOLIC BLOOD PRESSURE: 133 MMHG | HEIGHT: 65 IN | HEART RATE: 57 BPM

## 2024-06-20 DIAGNOSIS — K21.9 GASTRO-ESOPHAGEAL REFLUX DISEASE WITHOUT ESOPHAGITIS: ICD-10-CM

## 2024-06-20 PROCEDURE — 99214 OFFICE O/P EST MOD 30 MIN: CPT | Performed by: INTERNAL MEDICINE

## 2024-06-20 RX ORDER — OMEPRAZOLE AND SODIUM BICARBONATE 40; 1680 MG/1; MG/1
40 POWDER, FOR SUSPENSION ORAL
Qty: 90 | Refills: 3 | Status: ACTIVE
Start: 2024-06-20

## 2024-08-02 ENCOUNTER — OFFICE (AMBULATORY)
Dept: URBAN - METROPOLITAN AREA PATHOLOGY 19 | Facility: PATHOLOGY | Age: 69
End: 2024-08-02
Payer: COMMERCIAL

## 2024-08-02 ENCOUNTER — OFFICE (AMBULATORY)
Age: 69
End: 2024-08-02
Payer: COMMERCIAL

## 2024-08-02 ENCOUNTER — OFFICE (AMBULATORY)
Dept: URBAN - METROPOLITAN AREA PATHOLOGY 19 | Facility: PATHOLOGY | Age: 69
End: 2024-08-02
Payer: MEDICARE

## 2024-08-02 ENCOUNTER — ON CAMPUS - OUTPATIENT (AMBULATORY)
Dept: URBAN - METROPOLITAN AREA HOSPITAL 2 | Facility: HOSPITAL | Age: 69
End: 2024-08-02
Payer: MEDICARE

## 2024-08-02 VITALS
SYSTOLIC BLOOD PRESSURE: 141 MMHG | OXYGEN SATURATION: 97 % | RESPIRATION RATE: 15 BRPM | HEART RATE: 60 BPM | SYSTOLIC BLOOD PRESSURE: 166 MMHG | RESPIRATION RATE: 18 BRPM | SYSTOLIC BLOOD PRESSURE: 136 MMHG | OXYGEN SATURATION: 100 % | SYSTOLIC BLOOD PRESSURE: 192 MMHG | SYSTOLIC BLOOD PRESSURE: 165 MMHG | HEART RATE: 52 BPM | DIASTOLIC BLOOD PRESSURE: 67 MMHG | DIASTOLIC BLOOD PRESSURE: 78 MMHG | HEART RATE: 53 BPM | HEIGHT: 65 IN | HEART RATE: 61 BPM | DIASTOLIC BLOOD PRESSURE: 115 MMHG | TEMPERATURE: 98.5 F | RESPIRATION RATE: 14 BRPM | DIASTOLIC BLOOD PRESSURE: 76 MMHG | HEART RATE: 54 BPM | WEIGHT: 245 LBS | DIASTOLIC BLOOD PRESSURE: 85 MMHG | DIASTOLIC BLOOD PRESSURE: 103 MMHG | OXYGEN SATURATION: 98 %

## 2024-08-02 DIAGNOSIS — K31.89 OTHER DISEASES OF STOMACH AND DUODENUM: ICD-10-CM

## 2024-08-02 DIAGNOSIS — K31.7 POLYP OF STOMACH AND DUODENUM: ICD-10-CM

## 2024-08-02 DIAGNOSIS — K21.9 GASTRO-ESOPHAGEAL REFLUX DISEASE WITHOUT ESOPHAGITIS: ICD-10-CM

## 2024-08-02 LAB
GI HISTOLOGY: A. STOMACH BODY: (no result)
GI HISTOLOGY: B. STOMACH BODY: (no result)
GI HISTOLOGY: PDF REPORT: (no result)

## 2024-08-02 PROCEDURE — 88305 TISSUE EXAM BY PATHOLOGIST: CPT | Mod: 26 | Performed by: PATHOLOGY

## 2024-08-02 PROCEDURE — 43239 EGD BIOPSY SINGLE/MULTIPLE: CPT | Performed by: INTERNAL MEDICINE

## 2024-08-02 RX ORDER — OMEPRAZOLE AND SODIUM BICARBONATE 40; 1680 MG/1; MG/1
40 POWDER, FOR SUSPENSION ORAL
Qty: 90 | Refills: 3 | Status: ACTIVE
Start: 2024-06-20

## 2024-09-09 ENCOUNTER — OFFICE VISIT (OUTPATIENT)
Dept: ONCOLOGY | Facility: CLINIC | Age: 69
End: 2024-09-09
Payer: MEDICARE

## 2024-09-09 ENCOUNTER — OFFICE VISIT (OUTPATIENT)
Dept: LAB | Facility: HOSPITAL | Age: 69
End: 2024-09-09
Payer: MEDICARE

## 2024-09-09 ENCOUNTER — LAB (OUTPATIENT)
Dept: LAB | Facility: HOSPITAL | Age: 69
End: 2024-09-09
Payer: MEDICARE

## 2024-09-09 VITALS
HEIGHT: 65 IN | DIASTOLIC BLOOD PRESSURE: 70 MMHG | HEART RATE: 61 BPM | TEMPERATURE: 98.1 F | BODY MASS INDEX: 40.65 KG/M2 | SYSTOLIC BLOOD PRESSURE: 116 MMHG | WEIGHT: 244 LBS

## 2024-09-09 DIAGNOSIS — C50.412 MALIGNANT NEOPLASM OF UPPER-OUTER QUADRANT OF LEFT BREAST IN FEMALE, ESTROGEN RECEPTOR POSITIVE: Primary | ICD-10-CM

## 2024-09-09 DIAGNOSIS — Z17.0 MALIGNANT NEOPLASM OF UPPER-OUTER QUADRANT OF LEFT BREAST IN FEMALE, ESTROGEN RECEPTOR POSITIVE: Primary | ICD-10-CM

## 2024-09-09 DIAGNOSIS — Z17.0 MALIGNANT NEOPLASM OF UPPER-OUTER QUADRANT OF LEFT BREAST IN FEMALE, ESTROGEN RECEPTOR POSITIVE: ICD-10-CM

## 2024-09-09 DIAGNOSIS — C50.412 MALIGNANT NEOPLASM OF UPPER-OUTER QUADRANT OF LEFT BREAST IN FEMALE, ESTROGEN RECEPTOR POSITIVE: ICD-10-CM

## 2024-09-09 LAB
ALBUMIN SERPL-MCNC: 4 G/DL (ref 3.5–5.2)
ALBUMIN/GLOB SERPL: 1.1 G/DL
ALP SERPL-CCNC: 107 U/L (ref 39–117)
ALT SERPL W P-5'-P-CCNC: 14 U/L (ref 1–33)
ANION GAP SERPL CALCULATED.3IONS-SCNC: 8.9 MMOL/L (ref 5–15)
AST SERPL-CCNC: 21 U/L (ref 1–32)
BASOPHILS # BLD AUTO: 0.08 10*3/MM3 (ref 0–0.2)
BASOPHILS NFR BLD AUTO: 1.1 % (ref 0–1.5)
BILIRUB SERPL-MCNC: 0.4 MG/DL (ref 0–1.2)
BUN SERPL-MCNC: 23 MG/DL (ref 8–23)
BUN/CREAT SERPL: 25.6 (ref 7–25)
CALCIUM SPEC-SCNC: 10 MG/DL (ref 8.6–10.5)
CHLORIDE SERPL-SCNC: 103 MMOL/L (ref 98–107)
CO2 SERPL-SCNC: 30.1 MMOL/L (ref 22–29)
CREAT SERPL-MCNC: 0.9 MG/DL (ref 0.57–1)
DEPRECATED RDW RBC AUTO: 45.1 FL (ref 37–54)
EGFRCR SERPLBLD CKD-EPI 2021: 68.9 ML/MIN/1.73
EOSINOPHIL # BLD AUTO: 0.32 10*3/MM3 (ref 0–0.4)
EOSINOPHIL NFR BLD AUTO: 4.2 % (ref 0.3–6.2)
ERYTHROCYTE [DISTWIDTH] IN BLOOD BY AUTOMATED COUNT: 13.6 % (ref 12.3–15.4)
GLOBULIN UR ELPH-MCNC: 3.5 GM/DL
GLUCOSE SERPL-MCNC: 110 MG/DL (ref 65–99)
HCT VFR BLD AUTO: 43.7 % (ref 34–46.6)
HGB BLD-MCNC: 14.6 G/DL (ref 12–15.9)
IMM GRANULOCYTES # BLD AUTO: 0.04 10*3/MM3 (ref 0–0.05)
IMM GRANULOCYTES NFR BLD AUTO: 0.5 % (ref 0–0.5)
LYMPHOCYTES # BLD AUTO: 1.66 10*3/MM3 (ref 0.7–3.1)
LYMPHOCYTES NFR BLD AUTO: 21.9 % (ref 19.6–45.3)
MCH RBC QN AUTO: 30.2 PG (ref 26.6–33)
MCHC RBC AUTO-ENTMCNC: 33.4 G/DL (ref 31.5–35.7)
MCV RBC AUTO: 90.3 FL (ref 79–97)
MONOCYTES # BLD AUTO: 0.7 10*3/MM3 (ref 0.1–0.9)
MONOCYTES NFR BLD AUTO: 9.2 % (ref 5–12)
NEUTROPHILS NFR BLD AUTO: 4.78 10*3/MM3 (ref 1.7–7)
NEUTROPHILS NFR BLD AUTO: 63.1 % (ref 42.7–76)
NRBC BLD AUTO-RTO: 0 /100 WBC (ref 0–0.2)
PLATELET # BLD AUTO: 202 10*3/MM3 (ref 140–450)
PMV BLD AUTO: 10.6 FL (ref 6–12)
POTASSIUM SERPL-SCNC: 4.7 MMOL/L (ref 3.5–5.2)
PROT SERPL-MCNC: 7.5 G/DL (ref 6–8.5)
RBC # BLD AUTO: 4.84 10*6/MM3 (ref 3.77–5.28)
SODIUM SERPL-SCNC: 142 MMOL/L (ref 136–145)
WBC NRBC COR # BLD AUTO: 7.58 10*3/MM3 (ref 3.4–10.8)

## 2024-09-09 PROCEDURE — 1159F MED LIST DOCD IN RCRD: CPT | Performed by: INTERNAL MEDICINE

## 2024-09-09 PROCEDURE — G2211 COMPLEX E/M VISIT ADD ON: HCPCS | Performed by: INTERNAL MEDICINE

## 2024-09-09 PROCEDURE — 85025 COMPLETE CBC W/AUTO DIFF WBC: CPT

## 2024-09-09 PROCEDURE — 3078F DIAST BP <80 MM HG: CPT | Performed by: INTERNAL MEDICINE

## 2024-09-09 PROCEDURE — 1126F AMNT PAIN NOTED NONE PRSNT: CPT | Performed by: INTERNAL MEDICINE

## 2024-09-09 PROCEDURE — 36415 COLL VENOUS BLD VENIPUNCTURE: CPT

## 2024-09-09 PROCEDURE — 3074F SYST BP LT 130 MM HG: CPT | Performed by: INTERNAL MEDICINE

## 2024-09-09 PROCEDURE — 99214 OFFICE O/P EST MOD 30 MIN: CPT | Performed by: INTERNAL MEDICINE

## 2024-09-09 PROCEDURE — 80053 COMPREHEN METABOLIC PANEL: CPT

## 2024-09-09 PROCEDURE — 1160F RVW MEDS BY RX/DR IN RCRD: CPT | Performed by: INTERNAL MEDICINE

## 2024-09-09 NOTE — PROGRESS NOTES
Subjective   Mary Ann Michaels is a 69 y.o. female.  Referred by Dr. Isaac for left breast invasive lobular carcinoma    History of Present Illness    is a 66-year-old postmenopausal  lady with hypertension presented with a left breast screening abnormality.  Family history is significant for her daughter with DCIS and currently on tamoxifen at the age of 49, mother with history of ovarian cancer at the age of 67, maternal grandmother with history of pancreatic cancer in her 70s, 2 brothers with liver cancer.    12/7/2021-bilateral screening mammogram-new left breast focal asymmetry.  Diagnostic mammogram recommended.  Left breast ultrasound recommended.  Right breast is benign.    12/17/2021-left breast diagnostic mammogram and ultrasound  6 mm indistinct mass in the outer slightly upper left breast, posterior depth.  Ultrasound shows a 5 x 4 x 5 mm indistinct hypoechoic mass at 2:00, 9 cm from the nipple.  Ultrasound-guided biopsy of the left breast mass recommended.    1/12/2022-ultrasound-guided biopsy  1.specimen 1-left breast 2:00, 15 cm from the nipple-invasive lobular carcinoma, largest tumor focus 5 mm  Negative for carcinoma in situ or lymphovascular space invasion.  ER positive strong 100%  OH positive strong 100%  HER2 negative    Specimen to-left breast 2:00, 9 cm from the nipple  Invasive lobular carcinoma, focal atypical lobular hyperplasia  Negative for carcinoma in situ or lymphovascular space invasion.    1/30/2022-bilateral breast MRI  V shaped metallic clip is seen at the 2 o'clock position in the upper outer quadrant of the left breast which does not show any surrounding enhancement.  But this is the biopsy-proven site of malignancy.  This is posterior to a 5.2 cm clumped enhancement.  This is suspicious for additional disease.  No evidence of left axillary lymphadenopathy.  There is a more posterior located culture metallic clip in the posterior one third at 2 o'clock position  which is immediately adjacent to a 0.9 cm ill-defined enhancing lesion.  This represents biopsy-proven site of malignancy.  No other suspicious findings in the right breast.    She was seen by Dr. Isaac who performed a left mastectomy and sentinel lymph node biopsy on 3/30/2022  Pathology was consistent with invasive lobular carcinoma, 2 foci.  Larger focus measures 6 cm and the smaller focus measures 3.5 mm and noted in the nipple.  No skin ulceration.  7 sentinel lymph nodes were evaluated and negative for malignancy.  Both the tumors were strongly ER/ME positive at 99%, HER2 negative and Ki-67 was 12 to 13% on the larger tumor and 8% on the smaller tumor.    She is undergoing reconstruction by Dr. Montgomery.  She presents today accompanied by her friend Natalya to discuss adjuvant therapy.    Oncotype DX recurrence score noted to be 12 with a 9-year risk of distant metastasis of 3% with tamoxifen or AI alone.  And less than 1% benefit from chemotherapy.    Ms. Michaels has met with Dr. Cook and completed adjuvant radiation in July 2022.    Started anastrozole July 2022    Interval history  Mary Ann returns to the clinic today for follow-up.  She has been compliant with anastrozole.  She had an EGD performed in August 2024 which was benign.  She was having some hoarseness of the voice for which this was performed.  She denies any nausea vomiting abdominal pain, cough dyspnea or new bone pains.  Denies any new right breast masses or left breast abnormalities.  She usually gets her mammograms performed in [place] and this has been ordered and scheduled.      The following portions of the patient's history were reviewed and updated as appropriate: allergies, current medications, past family history, past medical history, past social history, past surgical history and problem list.    Past Medical History:   Diagnosis Date    Anxiety and depression     Female stress incontinence     GERD (gastroesophageal reflux  disease)     Hiatal hernia     History of left breast cancer 2021    History of radiation therapy 2022    25 TREATMENTS    Hypertension     Insomnia     Sleep apnea     CPAP        Past Surgical History:   Procedure Laterality Date    BILATERAL BREAST REDUCTION Right 2023    Procedure: RIGHT REDUCTION FOR SYMMETRY;  Surgeon: Raj Montgomery MD;  Location: [facility];  Service: Plastics;  Laterality: Right;    BREAST RECONSTRUCTION Left 2022    Procedure: LEFT PLACEMENT TISSUE EXPANDER AND ALLODERM;  Surgeon: Raj Montgomery MD;  Location: [facility] OR;  Service: Plastics;  Laterality: Left;    BREAST TISSUE EXPANDER REMOVAL INSERTION OF IMPLANT Left 2023    Procedure: LEFT REMOVAL JTISSUE EXPANDER AND PLACEMENT OF IMPLANT;  Surgeon: Raj Montgomery MD;  Location: [facility];  Service: Plastics;  Laterality: Left;    CATARACT EXTRACTION W/ INTRAOCULAR LENS IMPLANT Bilateral     1-3-2023    1-     SECTION          CHOLECYSTECTOMY      COLONOSCOPY      ENDOSCOPY      FAT GRAFTING Left 2023    Procedure: FAT GRAFTING;  Surgeon: Raj Montgomery MD;  Location: [facility];  Service: Plastics;  Laterality: Left;    HYSTERECTOMY      KNEE SURGERY Right     ARTHROSCOPY TENDON REPAIR    MASTECTOMY W/ SENTINEL NODE BIOPSY Left 2022    Procedure: left total mastectomy with left axillary sentinel lymph node biopsy;  Surgeon: Catherine Isaac MD;  Location: [facility];  Service: General;  Laterality: Left;        Family History   Problem Relation Age of Onset    Ovarian cancer Mother 67    Lung cancer Father     Liver cancer Brother     Liver cancer Brother     Pancreatic cancer Maternal Grandmother     Malig Hyperthermia Neg Hx         Social History     Socioeconomic History    Marital status:    Tobacco Use    Smoking status: Never     Passive exposure: Never    Smokeless tobacco: Never   Vaping Use    Vaping  "status: Never Used   Substance and Sexual Activity    Alcohol use: Never    Drug use: Never    Sexual activity: Defer        OB History    No obstetric history on file.     Age at menarche-11  Age at first live childbirth-18  She had a hysterectomy and a bilateral salpingo-oophorectomy at the age of 39  No use of hormone replacement therapy  Oral contraceptive pill use for 2 to 3 years   3 para 2  1    No Known Allergies     Review of Systems   Constitutional:  Positive for unexpected weight gain.   HENT: Negative.     Eyes: Negative.    Respiratory: Negative.     Cardiovascular: Negative.    Gastrointestinal: Negative.    Endocrine: Negative.    Genitourinary: Negative.    Musculoskeletal: Negative.    Allergic/Immunologic: Negative.    Hematological: Negative.    Psychiatric/Behavioral: Negative.       Review of systems as mentioned HPI otherwise negative    Objective   Blood pressure 116/70, pulse 61, temperature 98.1 °F (36.7 °C), temperature source Oral, height 165.1 cm (65\"), weight 111 kg (244 lb), not currently breastfeeding.     Physical Exam  Vitals reviewed.   Constitutional:       Appearance: Normal appearance. She is obese.   HENT:      Head: Normocephalic.      Nose: Nose normal.      Mouth/Throat:      Mouth: Mucous membranes are moist.   Eyes:      Conjunctiva/sclera: Conjunctivae normal.      Pupils: Pupils are equal, round, and reactive to light.   Cardiovascular:      Rate and Rhythm: Normal rate.   Pulmonary:      Effort: Pulmonary effort is normal.   Abdominal:      General: Abdomen is flat.   Musculoskeletal:         General: Normal range of motion.      Cervical back: Normal range of motion.   Skin:     General: Skin is warm.   Neurological:      General: No focal deficit present.      Mental Status: She is alert and oriented to person, place, and time. Mental status is at baseline.   Psychiatric:         Mood and Affect: Mood normal.         Behavior: Behavior normal.         " Thought Content: Thought content normal.         Judgment: Judgment normal.       Breast Exam: Left breast status post mastectomy with.  Right breast appears normal on inspection.   No other palpable abnormalities of the right breast.    I have reexamined the patient and the results are consistent with the previously documented exam. Barbie Dhillon MD      No visits with results within 30 Day(s) from this visit.   Latest known visit with results is:   Admission on 11/20/2023, Discharged on 11/23/2023   Component Date Value Ref Range Status    QT Interval 11/20/2023 526  ms Final    QTC Interval 11/20/2023 496  ms Final    Glucose 11/20/2023 99  65 - 99 mg/dL Final    BUN 11/20/2023 27 (H)  8 - 23 mg/dL Final    Creatinine 11/20/2023 0.77  0.57 - 1.00 mg/dL Final    Sodium 11/20/2023 144  136 - 145 mmol/L Final    Potassium 11/20/2023 3.7  3.5 - 5.2 mmol/L Final    Chloride 11/20/2023 103  98 - 107 mmol/L Final    CO2 11/20/2023 31.0 (H)  22.0 - 29.0 mmol/L Final    Calcium 11/20/2023 9.8  8.6 - 10.5 mg/dL Final    Total Protein 11/20/2023 6.8  6.0 - 8.5 g/dL Final    Albumin 11/20/2023 3.4 (L)  3.5 - 5.2 g/dL Final    ALT (SGPT) 11/20/2023 21  1 - 33 U/L Final    AST (SGOT) 11/20/2023 23  1 - 32 U/L Final    Alkaline Phosphatase 11/20/2023 97  39 - 117 U/L Final    Total Bilirubin 11/20/2023 0.3  0.0 - 1.2 mg/dL Final    Globulin 11/20/2023 3.4  gm/dL Final    A/G Ratio 11/20/2023 1.0  g/dL Final    BUN/Creatinine Ratio 11/20/2023 35.1 (H)  7.0 - 25.0 Final    Anion Gap 11/20/2023 10.0  5.0 - 15.0 mmol/L Final    eGFR 11/20/2023 83.6  >60.0 mL/min/1.73 Final    proBNP 11/20/2023 2,805.0 (H)  0.0 - 900.0 pg/mL Final    HS Troponin T 11/20/2023 17 (H)  <14 ng/L Final    ADENOVIRUS, PCR 11/20/2023 Not Detected  Not Detected Final    Coronavirus 229E 11/20/2023 Not Detected  Not Detected Final    Coronavirus HKU1 11/20/2023 Not Detected  Not Detected Final    Coronavirus NL63 11/20/2023 Not Detected  Not Detected  Final    Coronavirus OC43 11/20/2023 Not Detected  Not Detected Final    COVID19 11/20/2023 Not Detected  Not Detected - Ref. Range Final    Human Metapneumovirus 11/20/2023 Not Detected  Not Detected Final    Human Rhinovirus/Enterovirus 11/20/2023 Not Detected  Not Detected Final    Influenza A PCR 11/20/2023 Not Detected  Not Detected Final    Influenza B PCR 11/20/2023 Not Detected  Not Detected Final    Parainfluenza Virus 1 11/20/2023 Not Detected  Not Detected Final    Parainfluenza Virus 2 11/20/2023 Not Detected  Not Detected Final    Parainfluenza Virus 3 11/20/2023 Not Detected  Not Detected Final    Parainfluenza Virus 4 11/20/2023 Not Detected  Not Detected Final    RSV, PCR 11/20/2023 Not Detected  Not Detected Final    Bordetella pertussis pcr 11/20/2023 Not Detected  Not Detected Final    Bordetella parapertussis PCR 11/20/2023 Not Detected  Not Detected Final    Chlamydophila pneumoniae PCR 11/20/2023 Not Detected  Not Detected Final    Mycoplasma pneumo by PCR 11/20/2023 Not Detected  Not Detected Final    D-Dimer, Quantitative 11/20/2023 1.60 (H)  0.00 - 0.69 mg/L (FEU) Final    Extra Tube 11/20/2023 hold for add-on   Final    Auto resulted    Extra Tube 11/20/2023 Hold for add-ons.   Final    Auto resulted.    Extra Tube 11/20/2023 Hold for add-ons.   Final    Auto resulted    WBC 11/20/2023 8.90  3.40 - 10.80 10*3/mm3 Final    RBC 11/20/2023 4.50  3.77 - 5.28 10*6/mm3 Final    Hemoglobin 11/20/2023 13.8  12.0 - 15.9 g/dL Final    Hematocrit 11/20/2023 39.9  34.0 - 46.6 % Final    MCV 11/20/2023 88.7  79.0 - 97.0 fL Final    MCH 11/20/2023 30.6  26.6 - 33.0 pg Final    MCHC 11/20/2023 34.5  31.5 - 35.7 g/dL Final    RDW 11/20/2023 14.3  12.3 - 15.4 % Final    RDW-SD 11/20/2023 44.6  37.0 - 54.0 fl Final    MPV 11/20/2023 8.7  6.0 - 12.0 fL Final    Platelets 11/20/2023 247  140 - 450 10*3/mm3 Final    Scan Slide 11/20/2023    Final    See Manual Differential Results    Neutrophil % 11/20/2023  78.0 (H)  42.7 - 76.0 % Final    Lymphocyte % 11/20/2023 12.0 (L)  19.6 - 45.3 % Final    Monocyte % 11/20/2023 8.0  5.0 - 12.0 % Final    Eosinophil % 11/20/2023 1.0  0.3 - 6.2 % Final    Basophil % 11/20/2023 1.0  0.0 - 1.5 % Final    Neutrophils Absolute 11/20/2023 6.94  1.70 - 7.00 10*3/mm3 Final    Lymphocytes Absolute 11/20/2023 1.07  0.70 - 3.10 10*3/mm3 Final    Monocytes Absolute 11/20/2023 0.71  0.10 - 0.90 10*3/mm3 Final    Eosinophils Absolute 11/20/2023 0.09  0.00 - 0.40 10*3/mm3 Final    Basophils Absolute 11/20/2023 0.09  0.00 - 0.20 10*3/mm3 Final    Anisocytosis 11/20/2023 Slight/1+  None Seen Final    Poikilocytes 11/20/2023 Slight/1+  None Seen Final    WBC Morphology 11/20/2023 Normal  Normal Final    Platelet Morphology 11/20/2023 Normal  Normal Final    Blood Culture 11/20/2023 No growth at 5 days   Final    Blood Culture 11/20/2023 No growth at 5 days   Final    Procalcitonin 11/20/2023 0.07  0.00 - 0.25 ng/mL Final    LEGIONELLA ANTIGEN, URINE 11/20/2023 Negative  Negative Final    MRSA PCR 11/20/2023 No MRSA Detected  No MRSA Detected Final    Strep Pneumo Ag 11/20/2023 Negative  Negative Final    LVIDd 11/21/2023 4.9  cm Final    LVIDs 11/21/2023 3.7  cm Final    IVSd 11/21/2023 1.00  cm Final    LVPWd 11/21/2023 1.00  cm Final    FS 11/21/2023 24.5  % Final    IVS/LVPW 11/21/2023 1.00  cm Final    ESV(cubed) 11/21/2023 50.7  ml Final    EDV(cubed) 11/21/2023 117.6  ml Final    LV mass(C)d 11/21/2023 176.0  grams Final    LVOT area 11/21/2023 3.8  cm2 Final    LVOT diam 11/21/2023 2.20  cm Final    EDV(MOD-sp4) 11/21/2023 68.9  ml Final    ESV(MOD-sp4) 11/21/2023 19.0  ml Final    SV(MOD-sp4) 11/21/2023 49.9  ml Final    EF(MOD-sp4) 11/21/2023 72.4  % Final    MV E max john 11/21/2023 106.0  cm/sec Final    MV A max john 11/21/2023 91.2  cm/sec Final    MV dec time 11/21/2023 0.22  sec Final    MV E/A 11/21/2023 1.16   Final    Med Peak E' John 11/21/2023 8.6  cm/sec Final    Lat Peak E' John  11/21/2023 13.4  cm/sec Final    Avg E/e' ratio 11/21/2023 9.64   Final    SV(LVOT) 11/21/2023 107.2  ml Final    RVIDd 11/21/2023 3.2  cm Final    TAPSE (>1.6) 11/21/2023 3.4  cm Final    RV S' 11/21/2023 20.7  cm/sec Final    LA dimension (2D)  11/21/2023 4.1  cm Final    LV V1 max 11/21/2023 144.0  cm/sec Final    LV V1 max PG 11/21/2023 8.3  mmHg Final    LV V1 mean PG 11/21/2023 4.0  mmHg Final    LV V1 VTI 11/21/2023 28.2  cm Final    Ao pk winsome 11/21/2023 203.0  cm/sec Final    Ao max PG 11/21/2023 16.5  mmHg Final    Ao mean PG 11/21/2023 8.0  mmHg Final    Ao V2 VTI 11/21/2023 40.7  cm Final    BERTRAND(I,D) 11/21/2023 2.6  cm2 Final    MV max PG 11/21/2023 3.4  mmHg Final    MV mean PG 11/21/2023 2.00  mmHg Final    MV V2 VTI 11/21/2023 27.1  cm Final    MV P1/2t 11/21/2023 94.5  msec Final    MVA(P1/2t) 11/21/2023 2.33  cm2 Final    MVA(VTI) 11/21/2023 4.0  cm2 Final    MV dec slope 11/21/2023 313.0  cm/sec2 Final    RVOT diam 11/21/2023 2.5  cm Final    PA V2 max 11/21/2023 90.1  cm/sec Final    ACS 11/21/2023 2.10  cm Final    Sinus 11/21/2023 3.7  cm Final    Respiratory Culture 11/22/2023 Scant growth (1+) Normal respiratory orberto. No S. aureus or Pseudomonas aeruginosa detected. Final report.   Final    Gram Stain 11/22/2023 Moderate (3+) WBCs seen   Final    Gram Stain 11/22/2023 Rare (1+) Epithelial cells seen   Final    Gram Stain 11/22/2023 Rare (1+) Gram positive cocci in pairs   Final    Glucose 11/21/2023 91  65 - 99 mg/dL Final    BUN 11/21/2023 21  8 - 23 mg/dL Final    Creatinine 11/21/2023 0.89  0.57 - 1.00 mg/dL Final    Sodium 11/21/2023 142  136 - 145 mmol/L Final    Potassium 11/21/2023 3.5  3.5 - 5.2 mmol/L Final    Chloride 11/21/2023 99  98 - 107 mmol/L Final    CO2 11/21/2023 33.0 (H)  22.0 - 29.0 mmol/L Final    Calcium 11/21/2023 9.7  8.6 - 10.5 mg/dL Final    BUN/Creatinine Ratio 11/21/2023 23.6  7.0 - 25.0 Final    Anion Gap 11/21/2023 10.0  5.0 - 15.0 mmol/L Final    eGFR 11/21/2023  70.3  >60.0 mL/min/1.73 Final    WBC 11/21/2023 8.30  3.40 - 10.80 10*3/mm3 Final    RBC 11/21/2023 4.81  3.77 - 5.28 10*6/mm3 Final    Hemoglobin 11/21/2023 14.3  12.0 - 15.9 g/dL Final    Hematocrit 11/21/2023 42.4  34.0 - 46.6 % Final    MCV 11/21/2023 88.1  79.0 - 97.0 fL Final    MCH 11/21/2023 29.7  26.6 - 33.0 pg Final    MCHC 11/21/2023 33.8  31.5 - 35.7 g/dL Final    RDW 11/21/2023 14.9  12.3 - 15.4 % Final    RDW-SD 11/21/2023 48.6  37.0 - 54.0 fl Final    MPV 11/21/2023 9.0  6.0 - 12.0 fL Final    Platelets 11/21/2023 240  140 - 450 10*3/mm3 Final    Neutrophil % 11/21/2023 75.7  42.7 - 76.0 % Final    Lymphocyte % 11/21/2023 13.5 (L)  19.6 - 45.3 % Final    Monocyte % 11/21/2023 8.0  5.0 - 12.0 % Final    Eosinophil % 11/21/2023 2.3  0.3 - 6.2 % Final    Basophil % 11/21/2023 0.5  0.0 - 1.5 % Final    Neutrophils, Absolute 11/21/2023 6.30  1.70 - 7.00 10*3/mm3 Final    Lymphocytes, Absolute 11/21/2023 1.10  0.70 - 3.10 10*3/mm3 Final    Monocytes, Absolute 11/21/2023 0.70  0.10 - 0.90 10*3/mm3 Final    Eosinophils, Absolute 11/21/2023 0.20  0.00 - 0.40 10*3/mm3 Final    Basophils, Absolute 11/21/2023 0.00  0.00 - 0.20 10*3/mm3 Final    nRBC 11/21/2023 0.1  0.0 - 0.2 /100 WBC Final        No radiology results for the last 30 days.         Assessment & Plan     *Left breast invasive lobular carcinoma  pT3 multifocal N0 M0  Clinical prognostic stage II, pathological prognostic stage I.   2 foci of invasive lobular carcinoma noted,  #1 was 60 mm and the second was 3.5 mm.  Margins negative  Both the tumors were strongly ER/MD positive HER2 negative with a low Ki-67, grade 1  Oncotype DX recurrence score 12 with a 3% risk of distant metastasis with AI or tamoxifen alone and less than 1% benefit from chemotherapy  Completed adjuvant radiation to the left breast.  Started anastrozole July 2022  Right breast mammogram from December 2023 benign  Right breast [place] mammogram scheduled for December 2024.  No  evidence of recurrent disease  Will obtain CBC and CMP today.    *Bone health  DEXA from December 2021 reviewed and had a normal bone mineral density  DEXA scan December 2023 with normal bone density  DEXA next due December 2025.  This will be performed at [place].    *UTIs  She has about 2 UTIs per year and also has had urinary incontinence  Has been evaluated by urogynecology  Much improved since she has been using vaginal moisturizers.    *Hypertension  Blood pressure BP: 116/70   Continue current medication    *Hypercholesterolemia  Continue Crestor    *Alopecia  Could be secondary to anastrozole versus age  Started using Neutrafol to aid in hair regrowth.      *Family history of breast cancer  Genetic testing performed and negative for any pathogenic mutation.    *Weight gain/obesity  Started on semaglutide.  Lost about 11 pounds since her last visit  BMI 40.6.  Continue current medications.  Encouraged regular exercises.  Obesity is a risk factor for breast cancer.    *Hot flashes-secondary to AI.  Continue tart cherry.    PLAN:   Continue anastrozole daily.  Continue tart cherry concentrate.  Recommended exercising 30 minutes daily 5 days a week.  SAMANTA in 6 months and MD in 1 year  Right breast screening mammogram due December 2024, schedule  DEXA next due December 2025.    The patient is on high risk medication that requires close monitoring for toxicity.

## 2024-11-04 ENCOUNTER — OFFICE (AMBULATORY)
Dept: URBAN - METROPOLITAN AREA CLINIC 64 | Facility: CLINIC | Age: 69
End: 2024-11-04
Payer: MEDICARE

## 2024-11-04 ENCOUNTER — OFFICE (AMBULATORY)
Age: 69
End: 2024-11-04

## 2024-11-04 VITALS
WEIGHT: 246 LBS | HEIGHT: 65 IN | HEART RATE: 59 BPM | HEIGHT: 65 IN | SYSTOLIC BLOOD PRESSURE: 119 MMHG | DIASTOLIC BLOOD PRESSURE: 77 MMHG | DIASTOLIC BLOOD PRESSURE: 77 MMHG | SYSTOLIC BLOOD PRESSURE: 119 MMHG | HEART RATE: 59 BPM | WEIGHT: 246 LBS

## 2024-11-04 DIAGNOSIS — R49.0 DYSPHONIA: ICD-10-CM

## 2024-11-04 DIAGNOSIS — K21.9 GASTRO-ESOPHAGEAL REFLUX DISEASE WITHOUT ESOPHAGITIS: ICD-10-CM

## 2024-11-04 PROCEDURE — 99213 OFFICE O/P EST LOW 20 MIN: CPT | Performed by: NURSE PRACTITIONER

## 2025-02-25 ENCOUNTER — OFFICE VISIT (OUTPATIENT)
Dept: RADIATION ONCOLOGY | Facility: HOSPITAL | Age: 70
End: 2025-02-25
Payer: MEDICARE

## 2025-02-25 ENCOUNTER — OFFICE VISIT (OUTPATIENT)
Dept: ONCOLOGY | Facility: CLINIC | Age: 70
End: 2025-02-25
Payer: MEDICARE

## 2025-02-25 VITALS
HEART RATE: 65 BPM | HEIGHT: 65 IN | SYSTOLIC BLOOD PRESSURE: 134 MMHG | TEMPERATURE: 97.9 F | WEIGHT: 231.5 LBS | RESPIRATION RATE: 16 BRPM | BODY MASS INDEX: 38.57 KG/M2 | DIASTOLIC BLOOD PRESSURE: 84 MMHG | OXYGEN SATURATION: 95 %

## 2025-02-25 VITALS
WEIGHT: 205 LBS | HEART RATE: 70 BPM | OXYGEN SATURATION: 95 % | RESPIRATION RATE: 20 BRPM | BODY MASS INDEX: 34.11 KG/M2 | SYSTOLIC BLOOD PRESSURE: 152 MMHG | DIASTOLIC BLOOD PRESSURE: 84 MMHG

## 2025-02-25 DIAGNOSIS — C50.412 MALIGNANT NEOPLASM OF UPPER-OUTER QUADRANT OF LEFT BREAST IN FEMALE, ESTROGEN RECEPTOR POSITIVE: Primary | ICD-10-CM

## 2025-02-25 DIAGNOSIS — Z17.0 MALIGNANT NEOPLASM OF UPPER-OUTER QUADRANT OF LEFT BREAST IN FEMALE, ESTROGEN RECEPTOR POSITIVE: Primary | ICD-10-CM

## 2025-02-25 DIAGNOSIS — Z79.811 LONG TERM (CURRENT) USE OF AROMATASE INHIBITORS: ICD-10-CM

## 2025-02-25 PROCEDURE — G0463 HOSPITAL OUTPT CLINIC VISIT: HCPCS | Performed by: INTERNAL MEDICINE

## 2025-02-25 NOTE — PROGRESS NOTES
[facility] RADIATION ONCOLOGY  FOLLOW-UP    Name: Mary Ann Michaels  YOB: 1955  MRN #: [id number]  Date of Service: 2/25/2025    Chief Complaint:  1-year follow-up, breast cancer surveillance    Diagnosis:   Encounter Diagnosis   Name Primary?    Malignant neoplasm of upper-outer quadrant of left breast in female, estrogen receptor positive Yes        Radiation Treatment Course     Treating Physician: Dr. Ron Cook     Start: 6/13/2022     End: 7/18/2022   Site: Left Chest Wall Sclv    Total Dose: 5000 cGy    Dose/Fraction: 2000 cGy    Total Fractions: 25 Daily or BID: Daily  Modality: Photon  Technique: IMRT/VMAT/Rapid Arc  Bolus: No       Interval History     Mary Ann Michaels is a 69 y.o. female who was diagnosed with pT3N0 invasive lobular carcinoma, ER+/UT+/HER2- in January 2022.  She underwent a left total mastectomy with sentinel lymph node biopsy and tissue expander placement on 3/30/2022. She then completed 5000 cGy in 25 fractions to her left chest wall on 7/18/2022 and started anastrozole soon after. She returns to our clinic today for annual follow-up and breast cancer surveillance.    12/19/2024: Right breast mammogram was negative for signs of malignancy.    The patient reports discomfort over the left reconstructed chest wall. This area has hardened and shrunk in size compared to the contralateral breast. This is not only uncomfortable but also is mentally distressing given the cosmetic outcome. She has questions about potential options.       Imaging     Mammogram Screening Unilateral With Tomography, Right  12/19/2024  [facility]  Impression:  No mammographic signs of malignancy in the right breast.  Left mastectomy.  BI-RADS ASSESSMENT: BI-RADS 2: Benign findings.      Pathology     No new pathology to review.      Labs     Hematology:  WBC   Date Value Ref Range Status   09/09/2024 7.58 3.40 - 10.80 10*3/mm3 Final   11/14/2023 14.34 (H) 4.5 - 11.0 10*3/uL Final      RBC   Date Value Ref Range Status   09/09/2024 4.84 3.77 - 5.28 10*6/mm3 Final   11/14/2023 4.71 4.0 - 5.2 10*6/uL Final     Hemoglobin   Date Value Ref Range Status   09/09/2024 14.6 12.0 - 15.9 g/dL Final   11/14/2023 13.8 12.0 - 16.0 g/dL Final     Hematocrit   Date Value Ref Range Status   09/09/2024 43.7 34.0 - 46.6 % Final   11/14/2023 42.2 36.0 - 46.0 % Final     Platelets   Date Value Ref Range Status   09/09/2024 202 140 - 450 10*3/mm3 Final   11/14/2023 216 140 - 440 10*3/uL Final     Chemistry:  Lab Results   Component Value Date    GLUCOSE 110 (H) 09/09/2024    BUN 23 09/09/2024    CREATININE 0.90 09/09/2024    EGFRIFAFRI >60 02/22/2023    BCR 25.6 (H) 09/09/2024    K 4.7 09/09/2024    CO2 30.1 (H) 09/09/2024    CALCIUM 10.0 09/09/2024    ALBUMIN 4.0 09/09/2024    LABIL2 1.5 02/22/2023    AST 21 09/09/2024    ALT 14 09/09/2024       Problem List     Patient Active Problem List   Diagnosis    Breast cancer of upper-outer quadrant of left female breast    Elevated brain natriuretic peptide (BNP) level    Pneumonia    Anxiety disorder    Essential hypertension    Morbid obesity        Current Medications     Current Outpatient Medications   Medication Instructions    anastrozole (ARIMIDEX) 1 mg, Oral, Daily    citalopram (CeleXA) 40 MG tablet 1 tablet, Daily    lisinopril (PRINIVIL,ZESTRIL) 10 mg, Oral, Daily    rosuvastatin (CRESTOR) 20 mg, Daily    Semaglutide-Weight Management 1 MG/0.5ML solution auto-injector Inject  under the skin into the appropriate area as directed.    zolpidem (AMBIEN) 5 MG tablet 1 tablet, Nightly PRN        Allergies     No Known Allergies      Review of Systems     Review of Systems   Constitutional:  Negative for activity change and fatigue.   Skin:         Shrinking and hardening of left reconstructed chest wall.         Vitals:    02/25/25 1148   BP: 152/84   Pulse: 70   Resp: 20   SpO2: 95%      Physical Exam  Constitutional:       General: She is not in acute  distress.  HENT:      Head: Normocephalic and atraumatic.   Pulmonary:      Effort: Pulmonary effort is normal. No respiratory distress.   Chest:      Comments: Left chest wall now with significant fibrosis and findings consistent with capsular contracture.  There is persistent mild discoloration and tenderness to palpation of the area.  The reconstructed left chest wall is significantly reduced in size and elevated compared to the contralateral right breast.  Neurological:      Mental Status: She is alert and oriented to person, place, and time. Mental status is at baseline.   Psychiatric:         Mood and Affect: Mood normal.         Behavior: Behavior normal.          ECOG:  Restricted in physically strenuous activity but ambulatory and able to carry out work of a light or sedentary nature, e.g., light house work, office work = 1        Assessment and Plan     Assessment:      Mary Ann Michaels is a 69 y.o. female who was diagnosed with pT3N0 invasive lobular carcinoma, ER+/AL+/HER2- in January 2022.  She underwent a left total mastectomy with sentinel lymph node biopsy and tissue expander placement on 3/30/2022. She then completed 5000 cGy in 25 fractions to her left chest wall on 7/18/2022 and started anastrozole soon after. She returns to our clinic today for annual follow-up and breast cancer surveillance. Interval right breast mammogram was negative.         Diagnoses and all orders for this visit:    1. Malignant neoplasm of upper-outer quadrant of left breast in female, estrogen receptor positive (Primary)       Plan:      Orders:  -Refer to plastic surgeon to review options for additional reconstruction to relieve discomfort of left chest wall and improve cosmetic outcome  -Follow-up in 1 year or sooner as clinically indicated  -We did discuss the potential role for Trental and vitamin E to potentially reduce fibrosis.  Patient declined for the time being and may reconsider if she is not a surgical  candidate.    We reviewed the patient's cosmetic outcome.  At this point she does not have obvious disease recurrence but does appear to have capsular contracture of the left reconstructed chest wall.  We discussed potential management options including referral back to plastic surgery for surgical intervention versus a trial of medical management with Trental and vitamin E.  At this time, the patient prefers surgical evaluation with her plastic surgeon.  Should she have no additional options, the patient will reach out to discuss potential medical options.  Patient expressed understanding.  She is encouraged to reach out with questions or concerns prior to her next appointment.    I spent 30 minutes caring for Mary Ann on this date of service. This time includes time spent by me in the following activities:preparing for the visit, reviewing tests, obtaining and/or reviewing a separately obtained history, performing a medically appropriate examination and/or evaluation , counseling and educating the patient/family/caregiver, documenting information in the medical record, and independently interpreting results and communicating that information with the patient/family/caregiver      Follow-Up     No follow-ups on file.     CC: Tasia Siu, SAMANTA

## 2025-02-25 NOTE — PROGRESS NOTES
Subjective   Mary Ann Michaels is a 69 y.o. female.  Referred by Dr. Isaac for left breast invasive lobular carcinoma    History of Present Illness    is a 66-year-old postmenopausal  lady with hypertension presented with a left breast screening abnormality.  Family history is significant for her daughter with DCIS and currently on tamoxifen at the age of 49, mother with history of ovarian cancer at the age of 67, maternal grandmother with history of pancreatic cancer in her 70s, 2 brothers with liver cancer.    12/7/2021-bilateral screening mammogram-new left breast focal asymmetry.  Diagnostic mammogram recommended.  Left breast ultrasound recommended.  Right breast is benign.    12/17/2021-left breast diagnostic mammogram and ultrasound  6 mm indistinct mass in the outer slightly upper left breast, posterior depth.  Ultrasound shows a 5 x 4 x 5 mm indistinct hypoechoic mass at 2:00, 9 cm from the nipple.  Ultrasound-guided biopsy of the left breast mass recommended.    1/12/2022-ultrasound-guided biopsy  1.specimen 1-left breast 2:00, 15 cm from the nipple-invasive lobular carcinoma, largest tumor focus 5 mm  Negative for carcinoma in situ or lymphovascular space invasion.  ER positive strong 100%  TX positive strong 100%  HER2 negative    Specimen to-left breast 2:00, 9 cm from the nipple  Invasive lobular carcinoma, focal atypical lobular hyperplasia  Negative for carcinoma in situ or lymphovascular space invasion.    1/30/2022-bilateral breast MRI  V shaped metallic clip is seen at the 2 o'clock position in the upper outer quadrant of the left breast which does not show any surrounding enhancement.  But this is the biopsy-proven site of malignancy.  This is posterior to a 5.2 cm clumped enhancement.  This is suspicious for additional disease.  No evidence of left axillary lymphadenopathy.  There is a more posterior located culture metallic clip in the posterior one third at 2 o'clock position  which is immediately adjacent to a 0.9 cm ill-defined enhancing lesion.  This represents biopsy-proven site of malignancy.  No other suspicious findings in the right breast.    She was seen by Dr. Isaac who performed a left mastectomy and sentinel lymph node biopsy on 3/30/2022  Pathology was consistent with invasive lobular carcinoma, 2 foci.  Larger focus measures 6 cm and the smaller focus measures 3.5 mm and noted in the nipple.  No skin ulceration.  7 sentinel lymph nodes were evaluated and negative for malignancy.  Both the tumors were strongly ER/TN positive at 99%, HER2 negative and Ki-67 was 12 to 13% on the larger tumor and 8% on the smaller tumor.    She is undergoing reconstruction by Dr. Montgomery.  She presents today accompanied by her friend Natalya to discuss adjuvant therapy.    Oncotype DX recurrence score noted to be 12 with a 9-year risk of distant metastasis of 3% with tamoxifen or AI alone.  And less than 1% benefit from chemotherapy.    Ms. Michaels has met with Dr. Cook and completed adjuvant radiation in July 2022.    Started anastrozole July 2022    Interval history  She returns today for 6 month follow up while continuing on anastrozole. She denies arthralgias or hot flashes.  She denies new breast masses, bone pain, changes in bowel habits, headaches or vision changes, shortness of breath or cough. She was seen by radiation oncology today, noted to have implant contracture on today's exam. The surrounding skin is very tight. She is going to schedule a follow up with her plastic surgery to discuss next steps. She started on semaglutide and has lost 30 pounds, however, since starting it she has noticed worsening hair loss. She is going to see dermatology to discuss treatment options for this.       The following portions of the patient's history were reviewed and updated as appropriate: allergies, current medications, past family history, past medical history, past social history, past  surgical history and problem list.    Past Medical History:   Diagnosis Date    Anxiety and depression     Female stress incontinence     GERD (gastroesophageal reflux disease)     Hiatal hernia     History of left breast cancer 2021    History of radiation therapy 2022    25 TREATMENTS    Hypertension     Insomnia     Sleep apnea     CPAP        Past Surgical History:   Procedure Laterality Date    BILATERAL BREAST REDUCTION Right 2023    Procedure: RIGHT REDUCTION FOR SYMMETRY;  Surgeon: Raj Montgomery MD;  Location: [facility] OR;  Service: Plastics;  Laterality: Right;    BREAST RECONSTRUCTION Left 2022    Procedure: LEFT PLACEMENT TISSUE EXPANDER AND ALLODERM;  Surgeon: Raj Montgomery MD;  Location: [facility] OR;  Service: Plastics;  Laterality: Left;    BREAST TISSUE EXPANDER REMOVAL INSERTION OF IMPLANT Left 2023    Procedure: LEFT REMOVAL JTISSUE EXPANDER AND PLACEMENT OF IMPLANT;  Surgeon: Raj Montgomery MD;  Location: [facility] OR;  Service: Plastics;  Laterality: Left;    CATARACT EXTRACTION W/ INTRAOCULAR LENS IMPLANT Bilateral     1-3-2023    1-     SECTION          CHOLECYSTECTOMY      COLONOSCOPY      ENDOSCOPY      FAT GRAFTING Left 2023    Procedure: FAT GRAFTING;  Surgeon: Raj Montgomery MD;  Location: [facility] OR;  Service: Plastics;  Laterality: Left;    HYSTERECTOMY      KNEE SURGERY Right     ARTHROSCOPY TENDON REPAIR    MASTECTOMY W/ SENTINEL NODE BIOPSY Left 2022    Procedure: left total mastectomy with left axillary sentinel lymph node biopsy;  Surgeon: Catherine Isaac MD;  Location: [facility];  Service: General;  Laterality: Left;        Family History   Problem Relation Age of Onset    Ovarian cancer Mother 67    Lung cancer Father     Liver cancer Brother     Liver cancer Brother     Pancreatic cancer Maternal Grandmother     Malig Hyperthermia Neg Hx         Social History      Socioeconomic History    Marital status:    Tobacco Use    Smoking status: Never     Passive exposure: Never    Smokeless tobacco: Never   Vaping Use    Vaping status: Never Used   Substance and Sexual Activity    Alcohol use: Never    Drug use: Never    Sexual activity: Defer        OB History    No obstetric history on file.     Age at menarche-11  Age at first live childbirth-18  She had a hysterectomy and a bilateral salpingo-oophorectomy at the age of 39  No use of hormone replacement therapy  Oral contraceptive pill use for 2 to 3 years   3 para 2  1    No Known Allergies       Objective   not currently breastfeeding.     Physical Exam  Constitutional:       Appearance: Normal appearance.   Cardiovascular:      Pulses: Normal pulses.   Pulmonary:      Effort: Pulmonary effort is normal.      Breath sounds: Normal breath sounds.   Neurological:      Mental Status: She is oriented to person, place, and time.   Psychiatric:         Mood and Affect: Mood normal.         Behavior: Behavior normal.       Previous breast exam: Left breast status post mastectomy with.  Right breast appears normal on inspection.   No other palpable abnormalities of the right breast. Contracture noted to left breast implant.     Breast exam not performed today as patient was seen in radiation oncology office and underwent breast exam earlier today.     Assessment & Plan     *Left breast invasive lobular carcinoma  pT3 multifocal N0 M0  Clinical prognostic stage II, pathological prognostic stage I.   2 foci of invasive lobular carcinoma noted,  #1 was 60 mm and the second was 3.5 mm.  Margins negative  Both the tumors were strongly ER/OH positive HER2 negative with a low Ki-67, grade 1  Oncotype DX recurrence score 12 with a 3% risk of distant metastasis with AI or tamoxifen alone and less than 1% benefit from chemotherapy  Completed adjuvant radiation to the left breast.  Started anastrozole 2022  Right  breast mammogram from December 2023 benign  Right breast [place] mammogram scheduled for December 2024.  No evidence of recurrent disease  Will obtain CBC and CMP today.  2/25/2025: Continue anastrozole. Tolerating well.     *Bone health  DEXA from December 2021 reviewed and had a normal bone mineral density  DEXA scan December 2023 with normal bone density  DEXA next due December 2025.  This will be performed at [place].    *UTIs  She has about 2 UTIs per year and also has had urinary incontinence  Has been evaluated by urogynecology  Much improved since she has been using vaginal moisturizers.    *Hypertension  Blood pressure BP: 134/84   Continue current medication    *Hypercholesterolemia  Continue Crestor    *Alopecia  Could be secondary to anastrozole versus age  Started using Neutrafol to aid in hair regrowth.    2/25/2025: Worsening. She is going to see a dermatologist.     *Family history of breast cancer  Genetic testing performed and negative for any pathogenic mutation.    *Weight gain/obesity  Started on semaglutide.  Lost about 11 pounds since her last visit  BMI 40.6.  Continue current medications.  Encouraged regular exercises.  Obesity is a risk factor for breast cancer.  2/25/2025: Start semaglutide and has lost 30 pounds.     *Hot flashes-secondary to AI.  Continue tart cherry.    PLAN:   Continue anastrozole daily   Continue tart cherry concentrate   Mammogram due 12/2025.  DEXA scan due 12/2025  Follow up with plastic surgery   Return to clinic in 6 months for MD visit     SAMANTA Casarez

## 2025-05-01 ENCOUNTER — OFFICE (AMBULATORY)
Dept: URBAN - METROPOLITAN AREA CLINIC 64 | Facility: CLINIC | Age: 70
End: 2025-05-01
Payer: MEDICARE

## 2025-05-01 VITALS
HEART RATE: 59 BPM | WEIGHT: 221 LBS | SYSTOLIC BLOOD PRESSURE: 128 MMHG | HEIGHT: 65 IN | DIASTOLIC BLOOD PRESSURE: 77 MMHG

## 2025-05-01 DIAGNOSIS — R49.0 DYSPHONIA: ICD-10-CM

## 2025-05-01 DIAGNOSIS — K21.9 GASTRO-ESOPHAGEAL REFLUX DISEASE WITHOUT ESOPHAGITIS: ICD-10-CM

## 2025-05-01 PROCEDURE — 99214 OFFICE O/P EST MOD 30 MIN: CPT | Performed by: NURSE PRACTITIONER

## 2025-05-01 RX ORDER — OMEPRAZOLE AND SODIUM BICARBONATE 40; 1100 MG/1; MG/1
CAPSULE ORAL
Qty: 90 | Refills: 3 | Status: ACTIVE
Start: 2025-05-01

## 2025-05-22 RX ORDER — ANASTROZOLE 1 MG/1
1 TABLET ORAL DAILY
Qty: 90 TABLET | Refills: 3 | Status: SHIPPED | OUTPATIENT
Start: 2025-05-22

## 2025-05-22 NOTE — TELEPHONE ENCOUNTER
Caller: Mary Ann Michaels    Relationship: Self    Best call back number: [phone]    Requested Prescriptions:   Requested Prescriptions     Pending Prescriptions Disp Refills    anastrozole (ARIMIDEX) 1 MG tablet 90 tablet 3     Sig: Take 1 tablet by mouth Daily.        Pharmacy where request should be sent:  [facility]    Last office visit with prescribing clinician: 9/9/2024   Last telemedicine visit with prescribing clinician: Visit date not found   Next office visit with prescribing clinician: 9/26/2025     Additional details provided by patient: NA    Does the patient have less than a 3 day supply:  [x] Yes  [] No    Would you like a call back once the refill request has been completed: [] Yes [] No    If the office needs to give you a call back, can they leave a voicemail: [] Yes [] No    Antonio Calderon Rep   05/22/25 15:52 EDT

## (undated) DEVICE — CVR TRANSD CIV FLX TPR 11.9 TO 3.8X61CM

## (undated) DEVICE — TOTAL TRAY, 16FR 10ML SIL FOLEY, URN: Brand: MEDLINE

## (undated) DEVICE — ELECTRD BLD EZ CLN MOD XLNG 2.75IN

## (undated) DEVICE — INTENDED FOR TISSUE SEPARATION, AND OTHER PROCEDURES THAT REQUIRE A SHARP SURGICAL BLADE TO PUNCTURE OR CUT.: Brand: BARD-PARKER ® STAINLESS STEEL BLADES

## (undated) DEVICE — NDL FLTR 19G 1 1/2 LF

## (undated) DEVICE — TBG SXN LIPO 3/8IDX5/8IN ODX10FT DISP

## (undated) DEVICE — SOL NACL 0.9PCT 100ML SGL

## (undated) DEVICE — GLV SURG BIOGEL LTX PF 8 1/2

## (undated) DEVICE — BNDG ELAS ELITE V/CLOSE 6IN 5YD LF STRL

## (undated) DEVICE — STPCK 3WY D201 DISCOFIX

## (undated) DEVICE — LOU MINOR PROCEDURE: Brand: MEDLINE INDUSTRIES, INC.

## (undated) DEVICE — PATIENT RETURN ELECTRODE, SINGLE-USE, CONTACT QUALITY MONITORING, ADULT, WITH 9FT CORD, FOR PATIENTS WEIGING OVER 33LBS. (15KG): Brand: MEGADYNE

## (undated) DEVICE — LUER-LOK 360°: Brand: CONNECTA, LUER-LOK

## (undated) DEVICE — JACKSON-PRATT 100CC BULB RESERVOIR: Brand: CARDINAL HEALTH

## (undated) DEVICE — TBG INFILTRATION CB 156IN

## (undated) DEVICE — SUT MNCRYL 3/0 PS2 18IN MCP497G

## (undated) DEVICE — MARKR SKIN W/RULR AND LBL

## (undated) DEVICE — APPL CHLORAPREP HI/LITE 26ML ORNG

## (undated) DEVICE — TOWEL,OR,DSP,ST,BLUE,STD,4/PK,20PK/CS: Brand: MEDLINE

## (undated) DEVICE — NDL HYPO PRECISIONGLIDE REG 25G 1 1/2

## (undated) DEVICE — Device

## (undated) DEVICE — CAUTERY TIP POLISHER: Brand: DEVON

## (undated) DEVICE — DEV DEL BRST/IMP GEL KELLER2 FUNNEL EA/5

## (undated) DEVICE — SMOKE EVACUATION TUBING WITH 7/8 IN TO 1/4 IN REDUCER: Brand: BUFFALO FILTER

## (undated) DEVICE — INTENDED FOR TISSUE SEPARATION, AND OTHER PROCEDURES THAT REQUIRE A SHARP SURGICAL BLADE TO PUNCTURE OR CUT.: Brand: BARD-PARKER ® CARBON RIB-BACK BLADES

## (undated) DEVICE — BANDAGE,GAUZE,BULKEE II,4.5"X4.1YD,STRL: Brand: MEDLINE

## (undated) DEVICE — NEEDLE, QUINCKE 22GX3.5": Brand: MEDLINE INDUSTRIES, INC.

## (undated) DEVICE — ADHS SKIN SURG TISS VISC PREMIERPRO EXOFIN HI/VISC FAST/DRY

## (undated) DEVICE — NEEDLE, QUINCKE, 20GX3.5": Brand: MEDLINE

## (undated) DEVICE — 1010 S-DRAPE TOWEL DRAPE 10/BX: Brand: STERI-DRAPE™

## (undated) DEVICE — MEDICINE CUP, GRADUATED, STER: Brand: MEDLINE

## (undated) DEVICE — PROXIMATE RH ROTATING HEAD SKIN STAPLERS (35 WIDE) CONTAINS 35 STAINLESS STEEL STAPLES: Brand: PROXIMATE

## (undated) DEVICE — STPLR SKIN VISISTAT WD 35CT

## (undated) DEVICE — ANTIBACTERIAL UNDYED BRAIDED (POLYGLACTIN 910), SYNTHETIC ABSORBABLE SUTURE: Brand: COATED VICRYL

## (undated) DEVICE — SPNG LAP 18X18IN LF STRL PK/5

## (undated) DEVICE — SYR LUERLOK 30CC

## (undated) DEVICE — BIOPATCH™ ANTIMICROBIAL DRESSING WITH CHLORHEXIDINE GLUCONATE IS A HYDROPHILLIC POLYURETHANE ABSORPTIVE FOAM WITH CHLORHEXIDINE GLUCONATE (CHG) WHICH INHIBITS BACTERIAL GROWTH UNDER THE DRESSING. THE DRESSING IS INTENDED TO BE USED TO ABSORB EXUDATE, COVER A WOUND CAUSED BY VASCULAR AND NONVASCULAR PERCUTANEOUS MEDICAL DEVICES DURING SURGERY, AS WELL AS REDUCE LOCAL INFECTION AND COLONIZATION OF MICROORGANISMS.: Brand: BIOPATCH

## (undated) DEVICE — ELECTRD BLD MEGADYNE 2.75IN SS

## (undated) DEVICE — DECANT BG O JET

## (undated) DEVICE — IRRIGATOR BULB ASEPTO 60CC STRL

## (undated) DEVICE — SYRINGE, LUER LOCK, 60ML: Brand: MEDLINE

## (undated) DEVICE — SUT MNCRYL PLS ANTIB UD 4/0 PS2 18IN

## (undated) DEVICE — APPL CHLORAPREP 26ML CLR

## (undated) DEVICE — DRSNG SURESITE WNDW 4X4.5

## (undated) DEVICE — SYR LL TP 10ML STRL

## (undated) DEVICE — 3M™ IOBAN™ 2 ANTIMICROBIAL INCISE DRAPE 6650EZ: Brand: IOBAN™ 2

## (undated) DEVICE — ADHS SKIN PREMIERPRO EXOFIN TOPICAL HI/VISC .5ML

## (undated) DEVICE — CONN TBG Y 5 IN 1 LF STRL

## (undated) DEVICE — DISPOSABLE BIPOLAR CABLE 12FT. (3.6M): Brand: KIRWAN

## (undated) DEVICE — PK UNIV COMPL 40

## (undated) DEVICE — SOL LR 1000ML

## (undated) DEVICE — TRAP FLD MINIVAC MEGADYNE 100ML

## (undated) DEVICE — TBG PENCL TELESCP MEGADYNE SMOKE EVAC 10FT

## (undated) DEVICE — SUT ETHLN 3/0 PS1 18IN 1663H

## (undated) DEVICE — SUT VIC 3/0 TIES 18IN J110T

## (undated) DEVICE — STPLR SKIN SUBCUTICULAR INSORB 2030

## (undated) DEVICE — SOL NS 500ML

## (undated) DEVICE — SUT PDS 2/0 SH 27IN Z317H

## (undated) DEVICE — DECANTER BAG 9": Brand: MEDLINE INDUSTRIES, INC.

## (undated) DEVICE — LINER CANSTR SXN QUICKFIT 3000CC

## (undated) DEVICE — SYRINGE,TOOMEY,IRRIGATION,70CC,STERILE: Brand: MEDLINE

## (undated) DEVICE — SKIN PREP TRAY W/CHG: Brand: MEDLINE INDUSTRIES, INC.

## (undated) DEVICE — GLV SURG BIOGEL LTX PF 7

## (undated) DEVICE — COVER,MAYO STAND,STERILE: Brand: MEDLINE

## (undated) DEVICE — GAUZE,SPONGE,FLUFF,6"X6.75",STRL,10/TRAY: Brand: MEDLINE

## (undated) DEVICE — PREP SOL POVIDONE/IODINE BT 4OZ